# Patient Record
Sex: FEMALE | Race: WHITE | Employment: UNEMPLOYED
[De-identification: names, ages, dates, MRNs, and addresses within clinical notes are randomized per-mention and may not be internally consistent; named-entity substitution may affect disease eponyms.]

---

## 2017-01-18 ENCOUNTER — OFFICE VISIT (OUTPATIENT)
Dept: FAMILY MEDICINE CLINIC | Facility: CLINIC | Age: 27
End: 2017-01-18

## 2017-01-18 VITALS
WEIGHT: 214.6 LBS | SYSTOLIC BLOOD PRESSURE: 131 MMHG | HEART RATE: 88 BPM | BODY MASS INDEX: 30.72 KG/M2 | DIASTOLIC BLOOD PRESSURE: 83 MMHG | HEIGHT: 70 IN

## 2017-01-18 DIAGNOSIS — L40.9 PSORIASIS: ICD-10-CM

## 2017-01-18 DIAGNOSIS — J45.20 REACTIVE AIRWAY DISEASE, MILD INTERMITTENT, UNCOMPLICATED: Primary | ICD-10-CM

## 2017-01-18 PROCEDURE — 99214 OFFICE O/P EST MOD 30 MIN: CPT | Performed by: FAMILY MEDICINE

## 2017-02-16 ENCOUNTER — OFFICE VISIT (OUTPATIENT)
Dept: FAMILY MEDICINE CLINIC | Facility: CLINIC | Age: 27
End: 2017-02-16

## 2017-02-16 VITALS
DIASTOLIC BLOOD PRESSURE: 69 MMHG | BODY MASS INDEX: 31.26 KG/M2 | HEART RATE: 74 BPM | TEMPERATURE: 96.9 F | WEIGHT: 218.4 LBS | OXYGEN SATURATION: 95 % | HEIGHT: 70 IN | SYSTOLIC BLOOD PRESSURE: 104 MMHG

## 2017-02-16 DIAGNOSIS — J45.20 REACTIVE AIRWAY DISEASE, MILD INTERMITTENT, UNCOMPLICATED: Primary | ICD-10-CM

## 2017-02-16 DIAGNOSIS — L40.9 PSORIASIS: ICD-10-CM

## 2017-02-16 PROCEDURE — 99213 OFFICE O/P EST LOW 20 MIN: CPT | Performed by: FAMILY MEDICINE

## 2017-04-22 ENCOUNTER — HOSPITAL ENCOUNTER (EMERGENCY)
Age: 27
Discharge: HOME | End: 2017-04-22
Attending: EMERGENCY MEDICINE
Payer: COMMERCIAL

## 2017-04-22 ENCOUNTER — APPOINTMENT (OUTPATIENT)
Dept: CT IMAGING | Age: 27
End: 2017-04-22
Payer: COMMERCIAL

## 2017-04-22 VITALS
RESPIRATION RATE: 16 BRPM | DIASTOLIC BLOOD PRESSURE: 54 MMHG | HEART RATE: 94 BPM | OXYGEN SATURATION: 99 % | WEIGHT: 220 LBS | HEIGHT: 70 IN | SYSTOLIC BLOOD PRESSURE: 109 MMHG | BODY MASS INDEX: 31.5 KG/M2 | TEMPERATURE: 98.5 F

## 2017-04-22 DIAGNOSIS — S09.90XA HEAD INJURY, INITIAL ENCOUNTER: Primary | ICD-10-CM

## 2017-04-22 DIAGNOSIS — G43.109 MIGRAINE WITH AURA AND WITHOUT STATUS MIGRAINOSUS, NOT INTRACTABLE: ICD-10-CM

## 2017-04-22 PROCEDURE — 2580000003 HC RX 258: Performed by: PHYSICIAN ASSISTANT

## 2017-04-22 PROCEDURE — 99284 EMERGENCY DEPT VISIT MOD MDM: CPT

## 2017-04-22 PROCEDURE — 96374 THER/PROPH/DIAG INJ IV PUSH: CPT

## 2017-04-22 PROCEDURE — 6360000002 HC RX W HCPCS: Performed by: PHYSICIAN ASSISTANT

## 2017-04-22 PROCEDURE — 70450 CT HEAD/BRAIN W/O DYE: CPT

## 2017-04-22 PROCEDURE — 96375 TX/PRO/DX INJ NEW DRUG ADDON: CPT

## 2017-04-22 RX ORDER — PROMETHAZINE HYDROCHLORIDE 25 MG/ML
12.5 INJECTION, SOLUTION INTRAMUSCULAR; INTRAVENOUS ONCE
Status: DISCONTINUED | OUTPATIENT
Start: 2017-04-22 | End: 2017-04-22

## 2017-04-22 RX ORDER — 0.9 % SODIUM CHLORIDE 0.9 %
1000 INTRAVENOUS SOLUTION INTRAVENOUS ONCE
Status: COMPLETED | OUTPATIENT
Start: 2017-04-22 | End: 2017-04-22

## 2017-04-22 RX ORDER — KETOROLAC TROMETHAMINE 30 MG/ML
30 INJECTION, SOLUTION INTRAMUSCULAR; INTRAVENOUS ONCE
Status: COMPLETED | OUTPATIENT
Start: 2017-04-22 | End: 2017-04-22

## 2017-04-22 RX ORDER — DIPHENHYDRAMINE HYDROCHLORIDE 50 MG/ML
50 INJECTION INTRAMUSCULAR; INTRAVENOUS ONCE
Status: COMPLETED | OUTPATIENT
Start: 2017-04-22 | End: 2017-04-22

## 2017-04-22 RX ADMIN — KETOROLAC TROMETHAMINE 30 MG: 30 INJECTION, SOLUTION INTRAMUSCULAR at 13:33

## 2017-04-22 RX ADMIN — PROCHLORPERAZINE EDISYLATE 10 MG: 5 INJECTION INTRAMUSCULAR; INTRAVENOUS at 13:40

## 2017-04-22 RX ADMIN — DIPHENHYDRAMINE HYDROCHLORIDE 50 MG: 50 INJECTION, SOLUTION INTRAMUSCULAR; INTRAVENOUS at 13:34

## 2017-04-22 RX ADMIN — SODIUM CHLORIDE 1000 ML: 9 INJECTION, SOLUTION INTRAVENOUS at 13:33

## 2017-04-22 ASSESSMENT — PAIN SCALES - GENERAL
PAINLEVEL_OUTOF10: 8
PAINLEVEL_OUTOF10: 8

## 2017-04-22 ASSESSMENT — PAIN DESCRIPTION - LOCATION: LOCATION: HEAD

## 2017-05-01 ENCOUNTER — TELEPHONE (OUTPATIENT)
Dept: FAMILY MEDICINE CLINIC | Age: 27
End: 2017-05-01

## 2017-05-01 ENCOUNTER — HOSPITAL ENCOUNTER (OUTPATIENT)
Age: 27
Setting detail: SPECIMEN
Discharge: HOME | End: 2017-05-01
Payer: COMMERCIAL

## 2017-05-01 DIAGNOSIS — E66.09 NON MORBID OBESITY DUE TO EXCESS CALORIES: Primary | ICD-10-CM

## 2017-05-01 DIAGNOSIS — E66.09 NON MORBID OBESITY DUE TO EXCESS CALORIES: ICD-10-CM

## 2017-05-01 LAB
ESTIMATED AVERAGE GLUCOSE: 111 MG/DL
HBA1C MFR BLD: 5.5 % (ref 4–6)
THYROXINE, FREE: 1.03 NG/DL (ref 0.93–1.7)
TSH SERPL DL<=0.05 MIU/L-ACNC: 5.84 MIU/L (ref 0.3–5)

## 2017-05-01 PROCEDURE — 84439 ASSAY OF FREE THYROXINE: CPT

## 2017-05-01 PROCEDURE — 36415 COLL VENOUS BLD VENIPUNCTURE: CPT

## 2017-05-01 PROCEDURE — 83036 HEMOGLOBIN GLYCOSYLATED A1C: CPT

## 2017-05-01 PROCEDURE — 84443 ASSAY THYROID STIM HORMONE: CPT

## 2017-05-03 ENCOUNTER — HOSPITAL ENCOUNTER (OUTPATIENT)
Age: 27
Discharge: HOME | End: 2017-05-03
Payer: COMMERCIAL

## 2017-05-03 ENCOUNTER — OFFICE VISIT (OUTPATIENT)
Dept: FAMILY MEDICINE CLINIC | Age: 27
End: 2017-05-03
Payer: COMMERCIAL

## 2017-05-03 VITALS
BODY MASS INDEX: 31.3 KG/M2 | TEMPERATURE: 96.9 F | WEIGHT: 218.6 LBS | DIASTOLIC BLOOD PRESSURE: 83 MMHG | OXYGEN SATURATION: 96 % | SYSTOLIC BLOOD PRESSURE: 124 MMHG | HEART RATE: 86 BPM | HEIGHT: 70 IN

## 2017-05-03 DIAGNOSIS — E03.8 SUBCLINICAL HYPOTHYROIDISM: ICD-10-CM

## 2017-05-03 DIAGNOSIS — R59.9 ENLARGED LYMPH NODE: ICD-10-CM

## 2017-05-03 DIAGNOSIS — L40.9 PSORIASIS: ICD-10-CM

## 2017-05-03 DIAGNOSIS — E03.8 SUBCLINICAL HYPOTHYROIDISM: Primary | ICD-10-CM

## 2017-05-03 DIAGNOSIS — J02.9 SORE THROAT: ICD-10-CM

## 2017-05-03 LAB — MONONUCLEOSIS SCREEN: NEGATIVE

## 2017-05-03 PROCEDURE — 99214 OFFICE O/P EST MOD 30 MIN: CPT | Performed by: FAMILY MEDICINE

## 2017-05-03 PROCEDURE — 36415 COLL VENOUS BLD VENIPUNCTURE: CPT

## 2017-05-03 PROCEDURE — 86376 MICROSOMAL ANTIBODY EACH: CPT

## 2017-05-03 PROCEDURE — 86308 HETEROPHILE ANTIBODY SCREEN: CPT

## 2017-05-03 RX ORDER — CYCLOBENZAPRINE HCL 10 MG
TABLET ORAL
COMMUNITY
End: 2017-11-28 | Stop reason: ALTCHOICE

## 2017-05-03 RX ORDER — AMOXICILLIN 500 MG/1
500 CAPSULE ORAL 2 TIMES DAILY
Qty: 20 CAPSULE | Refills: 0 | Status: SHIPPED | OUTPATIENT
Start: 2017-05-03 | End: 2017-05-13

## 2017-05-03 RX ORDER — CLOBETASOL PROPIONATE 0.5 MG/G
CREAM TOPICAL 2 TIMES DAILY
Qty: 1 TUBE | Refills: 1 | Status: SHIPPED | OUTPATIENT
Start: 2017-05-03 | End: 2018-03-29

## 2017-05-05 LAB — THYROID PEROXIDASE (TPO) AB: 113 IU/ML (ref 0–35)

## 2017-05-08 DIAGNOSIS — E03.8 SUBCLINICAL HYPOTHYROIDISM: Primary | ICD-10-CM

## 2017-05-08 RX ORDER — LEVOTHYROXINE AND LIOTHYRONINE 19; 4.5 UG/1; UG/1
30 TABLET ORAL DAILY
Qty: 30 TABLET | Refills: 3 | Status: SHIPPED | OUTPATIENT
Start: 2017-05-08 | End: 2017-06-20 | Stop reason: SDUPTHER

## 2017-05-31 ENCOUNTER — HOSPITAL ENCOUNTER (OUTPATIENT)
Age: 27
Discharge: HOME | End: 2017-05-31
Payer: COMMERCIAL

## 2017-05-31 ENCOUNTER — OFFICE VISIT (OUTPATIENT)
Dept: FAMILY MEDICINE CLINIC | Age: 27
End: 2017-05-31
Payer: COMMERCIAL

## 2017-05-31 VITALS
HEIGHT: 70 IN | OXYGEN SATURATION: 97 % | SYSTOLIC BLOOD PRESSURE: 122 MMHG | DIASTOLIC BLOOD PRESSURE: 82 MMHG | TEMPERATURE: 97.1 F | BODY MASS INDEX: 30.92 KG/M2 | HEART RATE: 80 BPM | RESPIRATION RATE: 17 BRPM | WEIGHT: 216 LBS

## 2017-05-31 DIAGNOSIS — R53.82 CHRONIC FATIGUE: ICD-10-CM

## 2017-05-31 DIAGNOSIS — J30.1 SEASONAL ALLERGIC RHINITIS DUE TO POLLEN: ICD-10-CM

## 2017-05-31 DIAGNOSIS — R41.3 MEMORY DIFFICULTIES: ICD-10-CM

## 2017-05-31 DIAGNOSIS — E03.8 SUBCLINICAL HYPOTHYROIDISM: Primary | ICD-10-CM

## 2017-05-31 DIAGNOSIS — R68.2 DRY MOUTH: ICD-10-CM

## 2017-05-31 LAB
ABSOLUTE EOS #: 0.3 K/UL (ref 0–0.4)
ABSOLUTE LYMPH #: 2 K/UL (ref 1–4.8)
ABSOLUTE MONO #: 0.5 K/UL (ref 0.1–1.3)
ALBUMIN SERPL-MCNC: 4 G/DL (ref 3.5–5.2)
ALBUMIN/GLOBULIN RATIO: ABNORMAL (ref 1–2.5)
ALP BLD-CCNC: 82 U/L (ref 35–104)
ALT SERPL-CCNC: 16 U/L (ref 5–33)
ANION GAP SERPL CALCULATED.3IONS-SCNC: 10 MMOL/L (ref 9–17)
AST SERPL-CCNC: 17 U/L
BASOPHILS # BLD: 1 %
BASOPHILS ABSOLUTE: 0.1 K/UL (ref 0–0.2)
BILIRUB SERPL-MCNC: 0.44 MG/DL (ref 0.3–1.2)
BUN BLDV-MCNC: 8 MG/DL (ref 6–20)
BUN/CREAT BLD: ABNORMAL (ref 9–20)
C-REACTIVE PROTEIN: 3.7 MG/L (ref 0–5)
CALCIUM SERPL-MCNC: 9.2 MG/DL (ref 8.6–10.4)
CHLORIDE BLD-SCNC: 103 MMOL/L (ref 98–107)
CO2: 27 MMOL/L (ref 20–31)
CREAT SERPL-MCNC: 0.84 MG/DL (ref 0.5–0.9)
DIFFERENTIAL TYPE: NORMAL
EOSINOPHILS RELATIVE PERCENT: 5 %
FOLATE: 14.8 NG/ML
GFR AFRICAN AMERICAN: >60 ML/MIN
GFR NON-AFRICAN AMERICAN: >60 ML/MIN
GFR SERPL CREATININE-BSD FRML MDRD: ABNORMAL ML/MIN/{1.73_M2}
GFR SERPL CREATININE-BSD FRML MDRD: ABNORMAL ML/MIN/{1.73_M2}
GLUCOSE BLD-MCNC: 108 MG/DL (ref 70–99)
HCT VFR BLD CALC: 39.8 % (ref 36–46)
HEMOGLOBIN: 13.3 G/DL (ref 12–16)
LYMPHOCYTES # BLD: 30 %
MCH RBC QN AUTO: 28.6 PG (ref 26–34)
MCHC RBC AUTO-ENTMCNC: 33.4 G/DL (ref 31–37)
MCV RBC AUTO: 85.6 FL (ref 80–100)
MONOCYTES # BLD: 8 %
PDW BLD-RTO: 13.1 % (ref 11.5–14.9)
PLATELET # BLD: 243 K/UL (ref 150–450)
PLATELET ESTIMATE: NORMAL
PMV BLD AUTO: 8.5 FL (ref 6–12)
POTASSIUM SERPL-SCNC: 4.1 MMOL/L (ref 3.7–5.3)
RBC # BLD: 4.65 M/UL (ref 4–5.2)
RBC # BLD: NORMAL 10*6/UL
SEDIMENTATION RATE, ERYTHROCYTE: 17 MM (ref 0–20)
SEG NEUTROPHILS: 56 %
SEGMENTED NEUTROPHILS ABSOLUTE COUNT: 3.7 K/UL (ref 1.3–9.1)
SODIUM BLD-SCNC: 140 MMOL/L (ref 135–144)
TOTAL PROTEIN: 7.5 G/DL (ref 6.4–8.3)
VITAMIN B-12: 320 PG/ML (ref 211–946)
VITAMIN D 25-HYDROXY: 17.9 NG/ML (ref 30–100)
WBC # BLD: 6.6 K/UL (ref 3.5–11)
WBC # BLD: NORMAL 10*3/UL

## 2017-05-31 PROCEDURE — 86235 NUCLEAR ANTIGEN ANTIBODY: CPT

## 2017-05-31 PROCEDURE — 99214 OFFICE O/P EST MOD 30 MIN: CPT | Performed by: FAMILY MEDICINE

## 2017-05-31 PROCEDURE — 86140 C-REACTIVE PROTEIN: CPT

## 2017-05-31 PROCEDURE — 80053 COMPREHEN METABOLIC PANEL: CPT

## 2017-05-31 PROCEDURE — 82746 ASSAY OF FOLIC ACID SERUM: CPT

## 2017-05-31 PROCEDURE — 82607 VITAMIN B-12: CPT

## 2017-05-31 PROCEDURE — 86038 ANTINUCLEAR ANTIBODIES: CPT

## 2017-05-31 PROCEDURE — 86225 DNA ANTIBODY NATIVE: CPT

## 2017-05-31 PROCEDURE — 36415 COLL VENOUS BLD VENIPUNCTURE: CPT

## 2017-05-31 PROCEDURE — 82306 VITAMIN D 25 HYDROXY: CPT

## 2017-05-31 PROCEDURE — 85651 RBC SED RATE NONAUTOMATED: CPT

## 2017-05-31 PROCEDURE — 85025 COMPLETE CBC W/AUTO DIFF WBC: CPT

## 2017-05-31 RX ORDER — FLUTICASONE PROPIONATE 50 MCG
1 SPRAY, SUSPENSION (ML) NASAL DAILY
Qty: 1 BOTTLE | Refills: 3 | Status: SHIPPED | OUTPATIENT
Start: 2017-05-31 | End: 2021-03-16 | Stop reason: ALTCHOICE

## 2017-05-31 RX ORDER — LORATADINE 10 MG/1
10 TABLET ORAL DAILY
Qty: 30 TABLET | Refills: 2 | Status: SHIPPED | OUTPATIENT
Start: 2017-05-31 | End: 2018-05-24 | Stop reason: SDUPTHER

## 2017-06-01 DIAGNOSIS — E55.9 VITAMIN D DEFICIENCY: Primary | ICD-10-CM

## 2017-06-01 LAB
ANA REFERENCE RANGE:: ABNORMAL
ANTI DNA DOUBLE STRANDED: 5 IU/ML
ANTI JO-1 IGG: 19 U/ML
ANTI RNP AB: 121 U/ML
ANTI SSA: 25 U/ML
ANTI SSB: 4 U/ML
ANTI-CENTROMERE: 8 U/ML
ANTI-NUCLEAR ANTIBODY (ANA): POSITIVE
ANTI-SCLERODERMA: 10 U/ML
ANTI-SMITH: 17 U/ML
HISTONE ANTIBODY: 11 U/ML

## 2017-06-01 RX ORDER — B-COMPLEX WITH VITAMIN C
1 TABLET ORAL DAILY
Qty: 30 TABLET | Refills: 3 | Status: SHIPPED | OUTPATIENT
Start: 2017-06-01 | End: 2018-02-22 | Stop reason: SDUPTHER

## 2017-06-06 DIAGNOSIS — R76.8 ANTI-RNP ANTIBODIES PRESENT: Primary | ICD-10-CM

## 2017-06-19 ENCOUNTER — HOSPITAL ENCOUNTER (OUTPATIENT)
Age: 27
Discharge: HOME | End: 2017-06-19
Payer: COMMERCIAL

## 2017-06-19 ENCOUNTER — OFFICE VISIT (OUTPATIENT)
Dept: FAMILY MEDICINE CLINIC | Age: 27
End: 2017-06-19
Payer: COMMERCIAL

## 2017-06-19 VITALS
BODY MASS INDEX: 31.92 KG/M2 | TEMPERATURE: 97.9 F | WEIGHT: 223 LBS | DIASTOLIC BLOOD PRESSURE: 73 MMHG | HEART RATE: 88 BPM | HEIGHT: 70 IN | RESPIRATION RATE: 20 BRPM | SYSTOLIC BLOOD PRESSURE: 118 MMHG

## 2017-06-19 DIAGNOSIS — E03.8 SUBCLINICAL HYPOTHYROIDISM: Primary | ICD-10-CM

## 2017-06-19 DIAGNOSIS — E03.8 SUBCLINICAL HYPOTHYROIDISM: ICD-10-CM

## 2017-06-19 DIAGNOSIS — R76.8 POSITIVE ANA (ANTINUCLEAR ANTIBODY): ICD-10-CM

## 2017-06-19 DIAGNOSIS — E55.9 VITAMIN D DEFICIENCY: ICD-10-CM

## 2017-06-19 DIAGNOSIS — R41.3 MEMORY LOSS: ICD-10-CM

## 2017-06-19 LAB
THYROXINE, FREE: 0.89 NG/DL (ref 0.93–1.7)
TSH SERPL DL<=0.05 MIU/L-ACNC: 6.85 MIU/L (ref 0.3–5)

## 2017-06-19 PROCEDURE — 99214 OFFICE O/P EST MOD 30 MIN: CPT | Performed by: NURSE PRACTITIONER

## 2017-06-19 PROCEDURE — 84443 ASSAY THYROID STIM HORMONE: CPT

## 2017-06-19 PROCEDURE — 84439 ASSAY OF FREE THYROXINE: CPT

## 2017-06-19 PROCEDURE — 36415 COLL VENOUS BLD VENIPUNCTURE: CPT

## 2017-06-19 RX ORDER — ERGOCALCIFEROL 1.25 MG/1
CAPSULE ORAL
Qty: 16 CAPSULE | Refills: 0 | Status: SHIPPED | OUTPATIENT
Start: 2017-06-19 | End: 2017-12-19 | Stop reason: ALTCHOICE

## 2017-06-19 ASSESSMENT — ENCOUNTER SYMPTOMS
COUGH: 0
SHORTNESS OF BREATH: 0
ABDOMINAL PAIN: 0
NAUSEA: 0

## 2017-06-20 DIAGNOSIS — E03.8 SUBCLINICAL HYPOTHYROIDISM: ICD-10-CM

## 2017-06-20 RX ORDER — LEVOTHYROXINE AND LIOTHYRONINE 38; 9 UG/1; UG/1
60 TABLET ORAL DAILY
Qty: 60 TABLET | Refills: 0 | Status: SHIPPED | OUTPATIENT
Start: 2017-06-20 | End: 2017-08-22 | Stop reason: SDUPTHER

## 2017-08-21 ENCOUNTER — TELEPHONE (OUTPATIENT)
Dept: FAMILY MEDICINE CLINIC | Age: 27
End: 2017-08-21

## 2017-08-21 ENCOUNTER — HOSPITAL ENCOUNTER (OUTPATIENT)
Age: 27
Discharge: HOME | End: 2017-08-21
Payer: COMMERCIAL

## 2017-08-21 DIAGNOSIS — E03.9 ACQUIRED HYPOTHYROIDISM: ICD-10-CM

## 2017-08-21 DIAGNOSIS — E03.9 ACQUIRED HYPOTHYROIDISM: Primary | ICD-10-CM

## 2017-08-21 DIAGNOSIS — E55.9 VITAMIN D DEFICIENCY: ICD-10-CM

## 2017-08-21 LAB
THYROXINE, FREE: 0.86 NG/DL (ref 0.93–1.7)
TSH SERPL DL<=0.05 MIU/L-ACNC: 5.75 MIU/L (ref 0.3–5)
VITAMIN D 25-HYDROXY: 24.2 NG/ML (ref 30–100)

## 2017-08-21 PROCEDURE — 84443 ASSAY THYROID STIM HORMONE: CPT

## 2017-08-21 PROCEDURE — 82306 VITAMIN D 25 HYDROXY: CPT

## 2017-08-21 PROCEDURE — 36415 COLL VENOUS BLD VENIPUNCTURE: CPT

## 2017-08-21 PROCEDURE — 84439 ASSAY OF FREE THYROXINE: CPT

## 2017-08-22 ENCOUNTER — OFFICE VISIT (OUTPATIENT)
Dept: FAMILY MEDICINE CLINIC | Age: 27
End: 2017-08-22
Payer: COMMERCIAL

## 2017-08-22 VITALS
BODY MASS INDEX: 34.83 KG/M2 | OXYGEN SATURATION: 99 % | HEIGHT: 68 IN | DIASTOLIC BLOOD PRESSURE: 60 MMHG | TEMPERATURE: 98.6 F | SYSTOLIC BLOOD PRESSURE: 102 MMHG | WEIGHT: 229.8 LBS | HEART RATE: 80 BPM | RESPIRATION RATE: 20 BRPM

## 2017-08-22 DIAGNOSIS — R76.8 POSITIVE ANA (ANTINUCLEAR ANTIBODY): ICD-10-CM

## 2017-08-22 DIAGNOSIS — J45.20 REACTIVE AIRWAY DISEASE, MILD INTERMITTENT, UNCOMPLICATED: ICD-10-CM

## 2017-08-22 DIAGNOSIS — E03.8 SUBCLINICAL HYPOTHYROIDISM: Primary | ICD-10-CM

## 2017-08-22 DIAGNOSIS — R76.8 ANTI-RNP ANTIBODIES PRESENT: ICD-10-CM

## 2017-08-22 PROCEDURE — 99214 OFFICE O/P EST MOD 30 MIN: CPT | Performed by: FAMILY MEDICINE

## 2017-08-22 RX ORDER — LEVOTHYROXINE AND LIOTHYRONINE 57; 13.5 UG/1; UG/1
90 TABLET ORAL DAILY
Qty: 30 TABLET | Refills: 3 | Status: SHIPPED | OUTPATIENT
Start: 2017-08-22 | End: 2018-01-09 | Stop reason: SDUPTHER

## 2017-11-28 ENCOUNTER — OFFICE VISIT (OUTPATIENT)
Dept: FAMILY MEDICINE CLINIC | Age: 27
End: 2017-11-28
Payer: COMMERCIAL

## 2017-11-28 VITALS
SYSTOLIC BLOOD PRESSURE: 120 MMHG | WEIGHT: 236.4 LBS | TEMPERATURE: 97.1 F | BODY MASS INDEX: 32.02 KG/M2 | OXYGEN SATURATION: 97 % | HEART RATE: 78 BPM | HEIGHT: 72 IN | DIASTOLIC BLOOD PRESSURE: 80 MMHG | RESPIRATION RATE: 20 BRPM

## 2017-11-28 DIAGNOSIS — M51.26 LUMBAR DISC HERNIATION: Primary | ICD-10-CM

## 2017-11-28 DIAGNOSIS — M54.16 LUMBAR RADICULOPATHY: ICD-10-CM

## 2017-11-28 DIAGNOSIS — E03.8 SUBCLINICAL HYPOTHYROIDISM: ICD-10-CM

## 2017-11-28 DIAGNOSIS — R73.03 PREDIABETES: ICD-10-CM

## 2017-11-28 LAB — HBA1C MFR BLD: 5.3 %

## 2017-11-28 PROCEDURE — 99214 OFFICE O/P EST MOD 30 MIN: CPT | Performed by: FAMILY MEDICINE

## 2017-11-28 PROCEDURE — 83036 HEMOGLOBIN GLYCOSYLATED A1C: CPT | Performed by: FAMILY MEDICINE

## 2017-11-28 PROCEDURE — 1036F TOBACCO NON-USER: CPT | Performed by: FAMILY MEDICINE

## 2017-11-28 PROCEDURE — G8484 FLU IMMUNIZE NO ADMIN: HCPCS | Performed by: FAMILY MEDICINE

## 2017-11-28 PROCEDURE — G8427 DOCREV CUR MEDS BY ELIG CLIN: HCPCS | Performed by: FAMILY MEDICINE

## 2017-11-28 PROCEDURE — G8418 CALC BMI BLW LOW PARAM F/U: HCPCS | Performed by: FAMILY MEDICINE

## 2017-11-28 RX ORDER — GABAPENTIN 100 MG/1
100 CAPSULE ORAL DAILY
Qty: 90 CAPSULE | Refills: 3 | Status: SHIPPED | OUTPATIENT
Start: 2017-11-28 | End: 2018-02-15 | Stop reason: SDUPTHER

## 2017-11-28 RX ORDER — TIZANIDINE HYDROCHLORIDE 2 MG/1
2 CAPSULE, GELATIN COATED ORAL 3 TIMES DAILY
Qty: 30 CAPSULE | Refills: 0 | Status: SHIPPED | OUTPATIENT
Start: 2017-11-28 | End: 2018-02-22 | Stop reason: SDUPTHER

## 2017-11-28 RX ORDER — NAPROXEN 500 MG/1
500 TABLET ORAL 2 TIMES DAILY WITH MEALS
Qty: 60 TABLET | Refills: 3 | Status: SHIPPED | OUTPATIENT
Start: 2017-11-28 | End: 2018-05-24 | Stop reason: SDUPTHER

## 2017-11-28 ASSESSMENT — ENCOUNTER SYMPTOMS
EYE REDNESS: 0
DIARRHEA: 0
STRIDOR: 0
COUGH: 0
RHINORRHEA: 0
BLOOD IN STOOL: 0
ABDOMINAL PAIN: 0
WHEEZING: 0
BACK PAIN: 1
PHOTOPHOBIA: 0
NAUSEA: 0
SHORTNESS OF BREATH: 0
SINUS PRESSURE: 0

## 2017-11-28 NOTE — PROGRESS NOTES
Visit Information    Have you changed or started any medications since your last visit including any over-the-counter medicines, vitamins, or herbal medicines? no   Have you stopped taking any of your medications? Is so, why? -  no  Are you having any side effects from any of your medications? - no    Have you seen any other physician or provider since your last visit? yes -  chriopractor   Have you had any other diagnostic tests since your last visit? yes -    Have you been seen in the emergency room and/or had an admission in a hospital since we last saw you?  no   Have you had your routine dental cleaning in the past 6 months?  no     Do you have an active MyChart account? If no, what is the barrier?   Yes    Patient Care Team:  Alina Byrd MD as PCP - General (Family Medicine)    Medical History Review  Past Medical, Family, and Social History reviewed and does contribute to the patient presenting condition    Health Maintenance   Topic Date Due    Pneumococcal med risk (1 of 1 - PPSV23) 04/04/2008    Cervical cancer screen  04/04/2010    Flu vaccine (1) 09/01/2017    DTaP/Tdap/Td vaccine (2 - Td) 12/27/2026    HIV screen  Completed

## 2017-11-28 NOTE — PROGRESS NOTES
Vit D, 25-Hydroxy 08/21/2017 24.2* 30.0 - 100.0 ng/mL Final    Comment:    Reference Range:  Vitamin D status         Range   Deficiency              <20 ng/mL   Mild Deficiency       20-30 ng/mL   Sufficiency           ng/mL   Toxicity               >100 ng/mL  Performed at [facility] [address] [phone]      TSH 08/21/2017 5.75* 0.30 - 5.00 mIU/L Final    Comment: Performed at [facility]: HEATH BACA MAYA [address] [facility]. [address]   [phone]      Thyroxine, Free 08/21/2017 0.86* 0.93 - 1.70 ng/dL Final    Comment: Performed at [facility]: HEATH BACA MAYA [address] [facility].  [address]   [phone]           Most recent labs reviewed:     Lab Results   Component Value Date    WBC 6.6 05/31/2017    HGB 13.3 05/31/2017    HCT 39.8 05/31/2017    MCV 85.6 05/31/2017     05/31/2017       Lab Results   Component Value Date     05/31/2017    K 4.1 05/31/2017     05/31/2017    CO2 27 05/31/2017    BUN 8 05/31/2017    CREATININE 0.84 05/31/2017    GLUCOSE 108 05/31/2017    CALCIUM 9.2 05/31/2017        Lab Results   Component Value Date    ALT 16 05/31/2017    AST 17 05/31/2017    ALKPHOS 82 05/31/2017    BILITOT 0.44 05/31/2017       Lab Results   Component Value Date    TSH 5.75 (H) 08/21/2017       No results found for: CHOL  No results found for: TRIG  No results found for: HDL  No results found for: LDLCALC, LDLCHOLESTEROL  No results found for: LABVLDL, VLDL  No results found for: [facility]    Lab Results   Component Value Date    LABA1C 5.3 11/28/2017       Lab Results   Component Value Date    PBWLUFPI48 320 05/31/2017       Lab Results   Component Value Date    FOLATE 14.8 05/31/2017       No results found for: IRON, TIBC, FERRITIN    Lab Results   Component Value Date    VITD25 24.2 (L) 08/21/2017             Current Outpatient Prescriptions   Medication Sig Dispense Refill    gabapentin (NEURONTIN) 100 MG capsule Take 1 capsule by mouth daily 90 capsule 3    diclofenac sodium 1 % GEL Apply 2 g topically 2 times daily 1 Tube 3    naproxen (NAPROSYN) 500 MG tablet Take 1 tablet by mouth 2 times daily (with meals) 60 tablet 3    tiZANidine (ZANAFLEX) 2 MG capsule Take 1 capsule by mouth 3 times daily 30 capsule 0    thyroid (ARMOUR THYROID) 90 MG tablet Take 1 tablet by mouth daily 30 tablet 3    Calcium Carbonate-Vitamin D (OYSTER SHELL CALCIUM/D) 500-200 MG-UNIT TABS Take 1 tablet by mouth daily 30 tablet 3    loratadine (CLARITIN) 10 MG tablet Take 1 tablet by mouth daily 30 tablet 2    albuterol sulfate  (90 BASE) MCG/ACT inhaler Inhale 2 puffs into the lungs every 6 hours as needed for Wheezing or Shortness of Breath 1 Inhaler 1    Respiratory Therapy Supplies (BREATHERITE VALVED MDI CHAMBER) RICHARD To be used with inhaler 1 Device 1    vitamin D (ERGOCALCIFEROL) 27244 units CAPS capsule Take one cap po twice weekly 16 capsule 0    fluticasone (FLONASE) 50 MCG/ACT nasal spray 1 spray by Nasal route daily 1 Bottle 3    clobetasol prop emollient base (CLOBETASOL PROPIONATE E) 0.05 % CREA Apply topically 2 times daily 1 Tube 1     No current facility-administered medications for this visit. Social History     Social History    Marital status:      Spouse name: N/A    Number of children: N/A    Years of education: N/A     Occupational History    Not on file.      Social History Main Topics    Smoking status: Never Smoker    Smokeless tobacco: Never Used    Alcohol use No    Drug use: No    Sexual activity: Not Currently     Partners: Male     Other Topics Concern    Not on file     Social History Narrative    No narrative on file     Counseling given: Not Answered        Family History   Problem Relation Age of Onset    Diabetes Paternal Grandmother     Heart Failure Maternal Grandmother     Heart Failure Maternal Grandfather     Diabetes Father              -rest of complaints with corresponding details per ROS    The patient's past medical, surgical, social, and family history as well as her current medications and allergies were reviewed as documented in today's encounter. Review of Systems   Constitutional: Negative for activity change, appetite change, fatigue and unexpected weight change. HENT: Negative for congestion, postnasal drip, rhinorrhea and sinus pressure. Eyes: Negative for photophobia, redness and visual disturbance. Respiratory: Negative for cough, shortness of breath, wheezing and stridor. Cardiovascular: Negative for chest pain and palpitations. Gastrointestinal: Negative for abdominal pain, blood in stool, diarrhea and nausea. Endocrine: Negative for polydipsia and polyphagia. Genitourinary: Negative for enuresis, flank pain, frequency, pelvic pain, urgency, vaginal bleeding and vaginal discharge. Musculoskeletal: Positive for arthralgias, back pain, gait problem and myalgias. Negative for neck pain and neck stiffness. Neurological: Positive for weakness and numbness. Negative for dizziness, light-headedness and headaches. Psychiatric/Behavioral: Negative for behavioral problems, confusion and decreased concentration. The patient is not nervous/anxious and is not hyperactive. Physical Exam    PHYSICAL EXAM:   VITALS:   Vitals:    11/28/17 0803   BP: 120/80   Pulse: 78   Resp: 20   Temp: 97.1 °F (36.2 °C)   SpO2: 97%     GENERAL:  Patient is a well-developed, well-nourished female  in no acute distress, alert and oriented x3, appropriate and pleasant conversation. HEAD: Normocephalic, atraumatic. EYES: Pupils equal, round and reactive to light and accommodation, extraocular   movements intact. ENT: Moist mucous membranes. No erythema is noted. NECK: Supple. No masses. No lymphadenopathy. CARDIOVASCULAR: Regular rate and rhythm. PULMONARY: Lungs are clear to auscultation bilaterally.    ABDOMEN: Soft, nontender, nondistended. Positive bowel sounds. MUSCULOSKELETAL:  Severe  tenderness at  lumbar spine at L2-L3 , patient winced due to pain. SLR positive on left side. Strength not checked due to pain. Sensations normal.  NEUROLOGIC: Cranial nerves II through XII grossly intact. No focal deficits are noted. ASSESSMENT AND PLAN      1. Lumbar disc herniation  - MRI report discussed with patient. Patient does not want to go for the physical therapy , referred to neurosurgeon for second opinion. Started on Neurontin , and topical lidocaine and naproxen as needed. - gabapentin (NEURONTIN) 100 MG capsule; Take 1 capsule by mouth daily  Dispense: 90 capsule; Refill: 3  - diclofenac sodium 1 % GEL; Apply 2 g topically 2 times daily  Dispense: 1 Tube; Refill: 3  - naproxen (NAPROSYN) 500 MG tablet; Take 1 tablet by mouth 2 times daily (with meals)  Dispense: 60 tablet; Refill: 3  - [address], MD, Neurosurgery [facility] [place]  - tiZANidine (ZANAFLEX) 2 MG capsule; Take 1 capsule by mouth 3 times daily  Dispense: 30 capsule; Refill: 0    2. Lumbar radiculopathy  - referral to pain management , Neurontin and naproxen. Patient will call me , if her symptoms increased  - gabapentin (NEURONTIN) 100 MG capsule; Take 1 capsule by mouth daily  Dispense: 90 capsule; Refill: 3  - diclofenac sodium 1 % GEL; Apply 2 g topically 2 times daily  Dispense: 1 Tube; Refill: 3  - naproxen (NAPROSYN) 500 MG tablet; Take 1 tablet by mouth 2 times daily (with meals)  Dispense: 60 tablet; Refill: 3  - [address], MD, Neurosurgery [facility] [place]  - tiZANidine (ZANAFLEX) 2 MG capsule; Take 1 capsule by mouth 3 times daily  Dispense: 30 capsule; Refill: 0    3. Subclinical hypothyroidism  - continue Synthroid , repeat TSH  - TSH With Reflex Ft4; Future    4.  Prediabetes  - hemoglobin A1c has improved  - POCT glycosylated hemoglobin (Hb A1C)      Orders Placed This Encounter   Procedures    TSH With Reflex Ft4     Standing Status:   Future     Standing Expiration Date:   11/28/2018   Yadira Emerson MD, Neurosurgery [address]     Referral Priority:   Routine     Referral Type:   Consult for Advice and Opinion     Referral Reason:   Specialty Services Required     Requested Specialty:   Neurosurgery     Number of Visits Requested:   Noé 42     Referral Priority:   Routine     Referral Type:   Consult for Advice and Opinion     Referral Reason:   Specialty Services Required     Number of Visits Requested:   1    POCT glycosylated hemoglobin (Hb A1C)         Medications Discontinued During This Encounter   Medication Reason    cyclobenzaprine (FLEXERIL) 10 MG tablet Alternate therapy       Vu Tarango received counseling on the following healthy behaviors: nutrition, exercise and medication adherence  Reviewed prior labs and health maintenance  Continue current medications, diet and exercise. Discussed use, benefit, and side effects of prescribed medications. Barriers to medication compliance addressed. Patient given educational materials - see patient instructions  Was a self-tracking handout given in paper form or via [employer]? Yes    Requested Prescriptions     Signed Prescriptions Disp Refills    gabapentin (NEURONTIN) 100 MG capsule 90 capsule 3     Sig: Take 1 capsule by mouth daily    diclofenac sodium 1 % GEL 1 Tube 3     Sig: Apply 2 g topically 2 times daily    naproxen (NAPROSYN) 500 MG tablet 60 tablet 3     Sig: Take 1 tablet by mouth 2 times daily (with meals)    tiZANidine (ZANAFLEX) 2 MG capsule 30 capsule 0     Sig: Take 1 capsule by mouth 3 times daily       All patient questions answered. Patient voiced understanding. Quality Measures    Body mass index is 17.31 kg/m². Elevated. Weight control planned discussed Healthy diet and regular exercise.     BP:

## 2017-12-19 ENCOUNTER — HOSPITAL ENCOUNTER (OUTPATIENT)
Dept: PAIN MANAGEMENT | Age: 27
Discharge: HOME | End: 2017-12-19
Payer: COMMERCIAL

## 2017-12-19 VITALS
TEMPERATURE: 97.5 F | HEART RATE: 79 BPM | WEIGHT: 236 LBS | SYSTOLIC BLOOD PRESSURE: 120 MMHG | HEIGHT: 70 IN | RESPIRATION RATE: 16 BRPM | OXYGEN SATURATION: 98 % | DIASTOLIC BLOOD PRESSURE: 72 MMHG | BODY MASS INDEX: 33.79 KG/M2

## 2017-12-19 DIAGNOSIS — M54.16 LEFT LUMBAR RADICULITIS: ICD-10-CM

## 2017-12-19 DIAGNOSIS — M51.26 LUMBAR DISC HERNIATION: Primary | ICD-10-CM

## 2017-12-19 PROCEDURE — 99205 OFFICE O/P NEW HI 60 MIN: CPT

## 2017-12-19 PROCEDURE — 80307 DRUG TEST PRSMV CHEM ANLYZR: CPT

## 2017-12-19 PROCEDURE — 99244 OFF/OP CNSLTJ NEW/EST MOD 40: CPT | Performed by: ANESTHESIOLOGY

## 2017-12-19 ASSESSMENT — PAIN DESCRIPTION - ONSET: ONSET: ON-GOING

## 2017-12-19 ASSESSMENT — PAIN DESCRIPTION - LOCATION: LOCATION: BACK;LEG

## 2017-12-19 ASSESSMENT — PAIN DESCRIPTION - ORIENTATION: ORIENTATION: RIGHT;LEFT;LOWER

## 2017-12-19 ASSESSMENT — ENCOUNTER SYMPTOMS
RESPIRATORY NEGATIVE: 1
BACK PAIN: 1
EYES NEGATIVE: 1
HEARTBURN: 1

## 2017-12-19 ASSESSMENT — PAIN DESCRIPTION - PAIN TYPE: TYPE: CHRONIC PAIN

## 2017-12-19 ASSESSMENT — PAIN DESCRIPTION - PROGRESSION: CLINICAL_PROGRESSION: GRADUALLY WORSENING

## 2017-12-19 ASSESSMENT — PAIN DESCRIPTION - FREQUENCY: FREQUENCY: CONTINUOUS

## 2017-12-19 ASSESSMENT — PAIN SCALES - GENERAL: PAINLEVEL_OUTOF10: 5
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
## 2017-12-24 LAB
6-ACETYLMORPHINE, UR: NOT DETECTED
7-AMINOCLONAZEPAM, URINE: NOT DETECTED
ALPHA-OH-ALPRAZ, URINE: NOT DETECTED
ALPRAZOLAM, URINE: NOT DETECTED
AMPHETAMINES, URINE: NOT DETECTED
BARBITURATES, URINE: NOT DETECTED
BENZOYLECGONINE, UR: NOT DETECTED
BUPRENORPHINE URINE: NOT DETECTED
CARISOPRODOL, UR: NOT DETECTED
CLONAZEPAM, URINE: NOT DETECTED
CODEINE, URINE: NOT DETECTED
CREATININE URINE: 216 MG/DL (ref 20–400)
DIAZEPAM, URINE: NOT DETECTED
DRUGS EXPECTED, UR: NORMAL
EER HI RES INTERP UR: NORMAL
ETHYL GLUCURONIDE UR: NOT DETECTED
FENTANYL URINE: NOT DETECTED
HYDROCODONE, URINE: NOT DETECTED
HYDROMORPHONE, URINE: NOT DETECTED
LORAZEPAM, URINE: NOT DETECTED
MARIJUANA METAB, UR: NOT DETECTED
MDA, UR: NOT DETECTED
MDEA, EVE, UR: NOT DETECTED
MDMA URINE: NOT DETECTED
MEPERIDINE METAB, UR: NOT DETECTED
METHADONE, URINE: NOT DETECTED
METHAMPHETAMINE, URINE: NOT DETECTED
METHYLPHENIDATE: NOT DETECTED
MIDAZOLAM, URINE: NOT DETECTED
MORPHINE URINE: NOT DETECTED
NORBUPRENORPHINE, URINE: NOT DETECTED
NORDIAZEPAM, URINE: NOT DETECTED
NORFENTANYL, URINE: NOT DETECTED
NORHYDROCODONE, URINE: NOT DETECTED
NOROXYCODONE, URINE: NOT DETECTED
NOROXYMORPHONE, URINE: NOT DETECTED
OXAZEPAM, URINE: NOT DETECTED
OXYCODONE URINE: NOT DETECTED
OXYMORPHONE, URINE: NOT DETECTED
PAIN MANAGEMENT DRUG PANEL INTERP, URINE: NORMAL
PAIN MGT DRUG PANEL, HI RES, UR: NORMAL
PCP,URINE: NOT DETECTED
PHENTERMINE, UR: NOT DETECTED
PROPOXYPHENE, URINE: NOT DETECTED
TAPENTADOL, URINE: NOT DETECTED
TAPENTADOL-O-SULFATE, URINE: NOT DETECTED
TEMAZEPAM, URINE: NOT DETECTED
TRAMADOL, URINE: NOT DETECTED
ZOLPIDEM, URINE: NOT DETECTED

## 2018-01-09 ENCOUNTER — OFFICE VISIT (OUTPATIENT)
Dept: FAMILY MEDICINE CLINIC | Age: 28
End: 2018-01-09
Payer: COMMERCIAL

## 2018-01-09 ENCOUNTER — HOSPITAL ENCOUNTER (OUTPATIENT)
Age: 28
Discharge: HOME | End: 2018-01-09
Payer: COMMERCIAL

## 2018-01-09 ENCOUNTER — TELEPHONE (OUTPATIENT)
Dept: PAIN MANAGEMENT | Age: 28
End: 2018-01-09

## 2018-01-09 VITALS
HEART RATE: 106 BPM | SYSTOLIC BLOOD PRESSURE: 128 MMHG | DIASTOLIC BLOOD PRESSURE: 84 MMHG | HEIGHT: 70 IN | WEIGHT: 239.2 LBS | BODY MASS INDEX: 34.24 KG/M2

## 2018-01-09 DIAGNOSIS — M51.26 LUMBAR DISC HERNIATION: Primary | ICD-10-CM

## 2018-01-09 DIAGNOSIS — J45.20 MILD INTERMITTENT REACTIVE AIRWAY DISEASE WITHOUT COMPLICATION: ICD-10-CM

## 2018-01-09 DIAGNOSIS — E03.8 SUBCLINICAL HYPOTHYROIDISM: ICD-10-CM

## 2018-01-09 DIAGNOSIS — M54.16 LUMBAR RADICULOPATHY: ICD-10-CM

## 2018-01-09 LAB
THYROXINE, FREE: 0.87 NG/DL (ref 0.93–1.7)
TSH SERPL DL<=0.05 MIU/L-ACNC: 8.01 MIU/L (ref 0.3–5)

## 2018-01-09 PROCEDURE — 1036F TOBACCO NON-USER: CPT | Performed by: FAMILY MEDICINE

## 2018-01-09 PROCEDURE — G8417 CALC BMI ABV UP PARAM F/U: HCPCS | Performed by: FAMILY MEDICINE

## 2018-01-09 PROCEDURE — 84439 ASSAY OF FREE THYROXINE: CPT

## 2018-01-09 PROCEDURE — G8484 FLU IMMUNIZE NO ADMIN: HCPCS | Performed by: FAMILY MEDICINE

## 2018-01-09 PROCEDURE — 36415 COLL VENOUS BLD VENIPUNCTURE: CPT

## 2018-01-09 PROCEDURE — 84443 ASSAY THYROID STIM HORMONE: CPT

## 2018-01-09 PROCEDURE — 99214 OFFICE O/P EST MOD 30 MIN: CPT | Performed by: FAMILY MEDICINE

## 2018-01-09 PROCEDURE — G8427 DOCREV CUR MEDS BY ELIG CLIN: HCPCS | Performed by: FAMILY MEDICINE

## 2018-01-09 RX ORDER — LEVOTHYROXINE AND LIOTHYRONINE 76; 18 UG/1; UG/1
120 TABLET ORAL DAILY
Qty: 30 TABLET | Refills: 3 | Status: SHIPPED | OUTPATIENT
Start: 2018-01-09 | End: 2018-02-22 | Stop reason: SDUPTHER

## 2018-01-09 ASSESSMENT — ENCOUNTER SYMPTOMS
WHEEZING: 0
BACK PAIN: 1
NAUSEA: 0
RHINORRHEA: 0
CONSTIPATION: 0
PHOTOPHOBIA: 0
SINUS PRESSURE: 0
RECTAL PAIN: 0
COUGH: 0
BLOOD IN STOOL: 0
ABDOMINAL PAIN: 0
DIARRHEA: 0
SHORTNESS OF BREATH: 0
SORE THROAT: 0
EYE REDNESS: 0
SINUS PAIN: 0

## 2018-01-09 ASSESSMENT — PATIENT HEALTH QUESTIONNAIRE - PHQ9
2. FEELING DOWN, DEPRESSED OR HOPELESS: 0
1. LITTLE INTEREST OR PLEASURE IN DOING THINGS: 0
SUM OF ALL RESPONSES TO PHQ9 QUESTIONS 1 & 2: 0
SUM OF ALL RESPONSES TO PHQ QUESTIONS 1-9: 0

## 2018-01-09 NOTE — PROGRESS NOTES
Chief Complaint   Patient presents with    Back Pain    Asthma         Susanna Yoo  here today for follow up on chronic medical problems, go over labs and/or diagnostic studies, and medication refills. Back Pain and Asthma      HPI: Patient is here for follow-up on asthma and low back pain. Patient's asthma is controlled on albuterol inhaler. She uses albuterol as needed 2-3 times in a month.    -Low back pain, patient has lumbar coagulopathy with disc herniation, patient has been referred to pain management and also neurosurgeon. Patient was seen by pain management and  they are going to start epidural injections. Patient reports she is having symptoms of urinary incontinence and and she has decreased sensations in bowel moments. She was asked same question at pain management, patient reports she did not answer to them because she was having niece with her and she felt awkward in front of her. Patient has appointment with neurosurgeon by the end of this month. Pain is controlled on medications . /84   Pulse 106   Ht 5' 10\" (1.778 m)   Wt 239 lb 3.2 oz (108.5 kg)   LMP 12/26/2017 (Approximate)   Breastfeeding? No   BMI 34.32 kg/m²   Body mass index is 34.32 kg/m². Wt Readings from Last 3 Encounters:   01/09/18 239 lb 3.2 oz (108.5 kg)   12/19/17 236 lb (107 kg)   11/28/17 236 lb 6.4 oz (107.2 kg)        []Negative depression screening. PHQ Scores 1/9/2018 12/27/2016   PHQ2 Score 0 0   PHQ9 Score 0 0      []1-4 = Minimal depression   []5-9 = Mild depression   []10-14 = Moderate depression   []15-19 = Moderately severe depression   []20-27 = Severe depression    Discussed testing with the patient and all questions fully answered.     Hospital Outpatient Visit on 12/19/2017   Component Date Value Ref Range Status    Pain Management Drug Panel Interp,* 12/24/2017 Consistent   Final    Comment: (NOTE)  ________________________________________________________________  DRUGS EXPECTED:  NO DRUGS EXPECTED  ________________________________________________________________  CONSISTENT with medications provided:  NO DRUGS DETECTED  ________________________________________________________________  INTERPRETIVE INFORMATION: Pain Mgt Alanis, Mass Spec/EMIT, Ur,                           Interp  Interpretation depends on accuracy and completeness of patient  medication information submitted by client.  6-Acetylmorphine, Ur 12/24/2017 Not Detected   Final    7-Aminoclonazepam, Urine 12/24/2017 Not Detected   Final    Alpha-OH-Alpraz, Urine 12/24/2017 Not Detected   Final    Alprazolam, Urine 12/24/2017 Not Detected   Final    Amphetamines, urine 12/24/2017 Not Detected   Final    Barbiturates, Ur 12/24/2017 Not Detected   Final    Benzoylecgonine, Ur 12/24/2017 Not Detected   Final    Buprenorphine Urine 12/24/2017 Not Detected   Final    Carisoprodol, Ur 12/24/2017 Not Detected   Final    Comment: (NOTE)  The carisoprodol immunoassay has cross-reactivity to carisoprodol  and meprobamate.       Clonazepam, Urine 12/24/2017 Not Detected   Final    Codeine, Urine 12/24/2017 Not Detected   Final    MDA, Ur 12/24/2017 Not Detected   Final    Diazepam, Urine 12/24/2017 Not Detected   Final    Ethyl Glucuronide Ur 12/24/2017 Not Detected   Final    Fentanyl, Ur 12/24/2017 Not Detected   Final    Hydrocodone, Urine 12/24/2017 Not Detected   Final    Hydromorphone, Urine 12/24/2017 Not Detected   Final    Lorazepam, Urine 12/24/2017 Not Detected   Final    Marijuana Metab, Ur 12/24/2017 Not Detected   Final    MDEA, ANJANA, Ur 12/24/2017 Not Detected   Final    MDMA URINE 12/24/2017 Not Detected   Final    Meperidine Metab, Ur 12/24/2017 Not Detected   Final    Methadone, Urine 12/24/2017 Not Detected   Final    Methamphetamine, Urine 12/24/2017 Not Detected   Final    Methylphenidate 12/24/2017 Not Detected   Final    Midazolam, Urine 12/24/2017 Not Detected   Final    Morphine Urine 12/24/2017 Not Detected   Final    Norbuprenorphine, Urine 12/24/2017 Not Detected   Final    Nordiazepam, Urine 12/24/2017 Not Detected   Final    Norfentanyl, Urine 12/24/2017 Not Detected   Final    NORHYDROCODONE, URINE 12/24/2017 Not Detected   Final    Noroxycodone, Urine 12/24/2017 Not Detected   Final    NOROXYMORPHONE, URINE 12/24/2017 Not Detected   Final    Oxazepam, Urine 12/24/2017 Not Detected   Final    Oxycodone Urine 12/24/2017 Not Detected   Final    Oxymorphone, Urine 12/24/2017 Not Detected   Final    PCP, Urine 12/24/2017 Not Detected   Final    Phentermine, Ur 12/24/2017 Not Detected   Final    Propoxyphene, Urine 12/24/2017 Not Detected   Final    Tapentadol-O-Sulfate, Urine 12/24/2017 Not Detected   Final    Tapentadol, Urine 12/24/2017 Not Detected   Final    Temazepam, Urine 12/24/2017 Not Detected   Final    Tramadol, Urine 12/24/2017 Not Detected   Final    Zolpidem, Urine 12/24/2017 Not Detected   Final    Drugs Expected, Ur 12/24/2017 NO MEDICATION   Final    Creatinine, Ur 12/24/2017 216.0  20.0 - 400.0 mg/dL Final    Pain Mgt Drug Panel, Hi Res, Ur 12/24/2017 See Below   Final    Comment: (NOTE)  Methodology: Qualitative Enzyme Immunoassay and Qualitative Liquid  Chromatography-Time of Flight-Mass Spectrometry or Tandem Mass  Spectrometry, Quantitative Spectrophotometry  The absence of expected drug(s) and/or drug metabolite(s) may  indicate non-compliance, inappropriate timing of specimen  collection relative to drug administration, poor drug absorption,  diluted/adulterated urine, or limitations of testing. The  concentration must be greater than or equal to the cutoff to be  reported as present. If specific drug concentrations are  required, contact the laboratory within two weeks of specimen  collection to request quantification by a second analytical  technique. Interpretive questions should be directed to the  laboratory.   Results based on immunoassay Alcohol use No    Drug use: No    Sexual activity: Not Currently     Partners: Male     Other Topics Concern    Not on file     Social History Narrative    No narrative on file     Counseling given: Yes        Family History   Problem Relation Age of Onset    Diabetes Paternal Grandmother     Heart Failure Maternal Grandmother     Heart Failure Maternal Grandfather     Diabetes Father              -rest of complaints with corresponding details per ROS    The patient's past medical, surgical, social, and family history as well as her current medications and allergies were reviewed as documented in today's encounter. Review of Systems   Constitutional: Positive for activity change. Negative for appetite change, chills, fatigue, fever and unexpected weight change. HENT: Negative for congestion, postnasal drip, rhinorrhea, sinus pain, sinus pressure, sneezing and sore throat. Eyes: Negative for photophobia, redness and visual disturbance. Respiratory: Negative for cough, shortness of breath and wheezing. Cardiovascular: Negative for chest pain and palpitations. Gastrointestinal: Negative for abdominal pain, blood in stool, constipation, diarrhea, nausea and rectal pain. Endocrine: Negative for polydipsia, polyphagia and polyuria. Genitourinary: Negative for difficulty urinating, dysuria, flank pain, pelvic pain, urgency, vaginal discharge and vaginal pain. Musculoskeletal: Positive for arthralgias, back pain and gait problem. Negative for joint swelling and myalgias. Neurological: Positive for weakness and numbness. Negative for headaches. Psychiatric/Behavioral: Negative for behavioral problems and decreased concentration. The patient is not nervous/anxious and is not hyperactive.       Positive for urinary incontinence and perianal numbness      Physical Exam    PHYSICAL EXAM:   VITALS:   Vitals:    01/09/18 1004   BP: 128/84   Pulse: 106     GENERAL:  Patient is a well-developed, well-nourished female  in no acute distress, alert and oriented x3, appropriate and pleasant conversation. HEAD: Normocephalic, atraumatic. EYES: Pupils equal, round and reactive to light and accommodation, extraocular   movements intact. ENT: Moist mucous membranes. No erythema is noted. NECK: Supple. No masses. No lymphadenopathy. CARDIOVASCULAR: Regular rate and rhythm. PULMONARY: Lungs are clear to auscultation bilaterally. ABDOMEN: Soft, nontender, nondistended. Positive bowel sounds. MUSCULOSKELETAL:Tender lower lumbar spine, SLR positive, decreased sensations. NEUROLOGIC: Cranial nerves II through XII grossly intact. No focal deficits are noted. ASSESSMENT AND PLAN      1. Lumbar disc herniation  -Discussed with patient that we will give a call to neurosurgeon office and we will try to get in so on, in the meantime,  if symptoms got worse go to the ER. 2. Lumbar radiculopathy  -We will give her a call after scheduled appointment with the neurosurgeon. Continue same medications for now     3. Subclinical hypothyroidism  -Repeat blood work ordered stable continue same medications     4. Mild intermittent reactive airway disease without complication  -Stable continue same medications       No orders of the defined types were placed in this encounter. There are no discontinued medications. Lissett Quiros received counseling on the following healthy behaviors: nutrition, exercise and medication adherence  Reviewed prior labs and health maintenance  Continue current medications, diet and exercise. Discussed use, benefit, and side effects of prescribed medications. Barriers to medication compliance addressed. Patient given educational materials - see patient instructions  Was a self-tracking handout given in paper form or via [facility]? Yes    Requested Prescriptions      No prescriptions requested or ordered in this encounter       All patient questions answered.   Patient voiced

## 2018-01-12 ENCOUNTER — HOSPITAL ENCOUNTER (OUTPATIENT)
Dept: GENERAL RADIOLOGY | Age: 28
Discharge: HOME | End: 2018-01-12
Payer: COMMERCIAL

## 2018-01-12 ENCOUNTER — HOSPITAL ENCOUNTER (OUTPATIENT)
Dept: PAIN MANAGEMENT | Age: 28
Discharge: HOME | End: 2018-01-12
Payer: COMMERCIAL

## 2018-01-12 VITALS
SYSTOLIC BLOOD PRESSURE: 109 MMHG | BODY MASS INDEX: 34.22 KG/M2 | HEIGHT: 70 IN | OXYGEN SATURATION: 100 % | RESPIRATION RATE: 16 BRPM | DIASTOLIC BLOOD PRESSURE: 68 MMHG | TEMPERATURE: 98.6 F | WEIGHT: 239 LBS | HEART RATE: 101 BPM

## 2018-01-12 DIAGNOSIS — R52 PAIN: ICD-10-CM

## 2018-01-12 PROBLEM — M51.26 LUMBAR DISC HERNIATION: Chronic | Status: ACTIVE | Noted: 2017-11-28

## 2018-01-12 PROCEDURE — 6360000004 HC RX CONTRAST MEDICATION

## 2018-01-12 PROCEDURE — 99152 MOD SED SAME PHYS/QHP 5/>YRS: CPT | Performed by: ANESTHESIOLOGY

## 2018-01-12 PROCEDURE — 64484 NJX AA&/STRD TFRM EPI L/S EA: CPT | Performed by: ANESTHESIOLOGY

## 2018-01-12 PROCEDURE — 2500000003 HC RX 250 WO HCPCS

## 2018-01-12 PROCEDURE — 64483 NJX AA&/STRD TFRM EPI L/S 1: CPT

## 2018-01-12 PROCEDURE — 6360000002 HC RX W HCPCS

## 2018-01-12 PROCEDURE — 64483 NJX AA&/STRD TFRM EPI L/S 1: CPT | Performed by: ANESTHESIOLOGY

## 2018-01-12 PROCEDURE — 3209999900 FLUORO FOR SURGICAL PROCEDURES

## 2018-01-12 PROCEDURE — 64484 NJX AA&/STRD TFRM EPI L/S EA: CPT

## 2018-01-12 ASSESSMENT — PAIN DESCRIPTION - PROGRESSION: CLINICAL_PROGRESSION: GRADUALLY WORSENING

## 2018-01-12 ASSESSMENT — PAIN DESCRIPTION - ORIENTATION: ORIENTATION: LEFT

## 2018-01-12 ASSESSMENT — PAIN DESCRIPTION - ONSET: ONSET: ON-GOING

## 2018-01-12 ASSESSMENT — PAIN DESCRIPTION - DESCRIPTORS: DESCRIPTORS: ACHING;CONSTANT;DULL;JABBING

## 2018-01-12 ASSESSMENT — PAIN DESCRIPTION - DIRECTION: RADIATING_TOWARDS: DOWN BOTH LEGS

## 2018-01-12 ASSESSMENT — PAIN SCALES - GENERAL: PAINLEVEL_OUTOF10: 6

## 2018-01-12 ASSESSMENT — PAIN DESCRIPTION - LOCATION: LOCATION: BACK;HIP;LEG

## 2018-01-12 ASSESSMENT — PAIN DESCRIPTION - PAIN TYPE: TYPE: CHRONIC PAIN

## 2018-01-12 NOTE — H&P
Office note dated December 19, 2017 in Epic with all required elements of H&P    Patient seen preop, chart reviewed, AAO x 3, in NAD, VSS,. No changes in medical history since last evaluation. Risk / Benefits explained to patient, patient agree to proceed with plan.   ASA 2  MP 2

## 2018-01-24 ENCOUNTER — INITIAL CONSULT (OUTPATIENT)
Dept: NEUROSURGERY | Age: 28
End: 2018-01-24
Payer: COMMERCIAL

## 2018-01-24 VITALS
DIASTOLIC BLOOD PRESSURE: 77 MMHG | BODY MASS INDEX: 34.07 KG/M2 | HEIGHT: 70 IN | SYSTOLIC BLOOD PRESSURE: 95 MMHG | WEIGHT: 238 LBS | HEART RATE: 85 BPM

## 2018-01-24 DIAGNOSIS — M51.16 LUMBAR DISC DISEASE WITH RADICULOPATHY: Primary | ICD-10-CM

## 2018-01-24 DIAGNOSIS — R29.898 UPPER EXTREMITY DYSFUNCTION: ICD-10-CM

## 2018-01-24 DIAGNOSIS — R32 INCONTINENCE OF URINE IN FEMALE: ICD-10-CM

## 2018-01-24 DIAGNOSIS — M41.27 OTHER IDIOPATHIC SCOLIOSIS, LUMBOSACRAL REGION: ICD-10-CM

## 2018-01-24 PROCEDURE — G8484 FLU IMMUNIZE NO ADMIN: HCPCS | Performed by: NEUROLOGICAL SURGERY

## 2018-01-24 PROCEDURE — G8417 CALC BMI ABV UP PARAM F/U: HCPCS | Performed by: NEUROLOGICAL SURGERY

## 2018-01-24 PROCEDURE — 99203 OFFICE O/P NEW LOW 30 MIN: CPT | Performed by: NEUROLOGICAL SURGERY

## 2018-01-24 PROCEDURE — G8427 DOCREV CUR MEDS BY ELIG CLIN: HCPCS | Performed by: NEUROLOGICAL SURGERY

## 2018-01-24 PROCEDURE — 1036F TOBACCO NON-USER: CPT | Performed by: NEUROLOGICAL SURGERY

## 2018-01-24 NOTE — PROGRESS NOTES
disease      Past Surgical History:   Procedure Laterality Date     SECTION  4/11/10     Family History   Problem Relation Age of Onset    Diabetes Paternal Grandmother     Heart Failure Maternal Grandmother     Heart Failure Maternal Grandfather     Diabetes Father      Current Outpatient Prescriptions on File Prior to Visit   Medication Sig Dispense Refill    thyroid (ARMOUR) 120 MG tablet Take 1 tablet by mouth daily 30 tablet 3    gabapentin (NEURONTIN) 100 MG capsule Take 1 capsule by mouth daily 90 capsule 3    diclofenac sodium 1 % GEL Apply 2 g topically 2 times daily 1 Tube 3    naproxen (NAPROSYN) 500 MG tablet Take 1 tablet by mouth 2 times daily (with meals) 60 tablet 3    tiZANidine (ZANAFLEX) 2 MG capsule Take 1 capsule by mouth 3 times daily 30 capsule 0    Calcium Carbonate-Vitamin D (OYSTER SHELL CALCIUM/D) 500-200 MG-UNIT TABS Take 1 tablet by mouth daily 30 tablet 3    loratadine (CLARITIN) 10 MG tablet Take 1 tablet by mouth daily 30 tablet 2    fluticasone (FLONASE) 50 MCG/ACT nasal spray 1 spray by Nasal route daily 1 Bottle 3    albuterol sulfate  (90 BASE) MCG/ACT inhaler Inhale 2 puffs into the lungs every 6 hours as needed for Wheezing or Shortness of Breath 1 Inhaler 1    Respiratory Therapy Supplies (BREATHERITE VALVED MDI CHAMBER) RICHARD To be used with inhaler 1 Device 1    clobetasol prop emollient base (CLOBETASOL PROPIONATE E) 0.05 % CREA Apply topically 2 times daily 1 Tube 1     No current facility-administered medications on file prior to visit. Social History   Substance Use Topics    Smoking status: Never Smoker    Smokeless tobacco: Never Used    Alcohol use No       Allergies   Allergen Reactions    Latex        Review of Systems  Constitutional: Negative for activity change and appetite change. HENT: Negative for ear pain and facial swelling. Eyes: Negative for discharge and itching.    Respiratory: Negative for choking and chest tightness. Cardiovascular: Negative for chest pain and leg swelling. Gastrointestinal: Negative for nausea and abdominal pain. Endocrine: Negative for cold intolerance and heat intolerance. Genitourinary: Negative for frequency and flank pain. Musculoskeletal: Negative for myalgias and joint swelling. Skin: Negative for rash and wound. Allergic/Immunologic: Negative for environmental allergies and food allergies. Hematological: Negative for adenopathy. Does not bruise/bleed easily. Psychiatric/Behavioral: Negative for self-injury. The patient is not nervous/anxious. Physical Exam:      BP 95/77 (Site: Left Arm, Position: Sitting, Cuff Size: Large Adult)   Pulse 85   Ht 5' 10\" (1.778 m)   Wt 238 lb (108 kg)   LMP 12/26/2017 (Approximate)   BMI 34.15 kg/m²   Estimated body mass index is 34.15 kg/m² as calculated from the following:    Height as of this encounter: 5' 10\" (1.778 m). Weight as of this encounter: 238 lb (108 kg). General:  Yamila Caban is a 28y.o. year old female who appears her stated age. HEENT: Normocephalic atraumatic. Neck supple. Chest: regular rate; pulses equal  Abdomen: Soft nontender nondistended. Normoactive bowel sounds. Neurologic Exam AO times 3. cranial nerves II 12 are intact. 5 bilateral biceps triceps deltoid  iliopsoas car says plantar flexion was flexed and EHL. Minimal diminished sensation lateral aspect of the left lower foot. Reflexes 2 out of 4 bilateral Achilles. 3/4 patellar reflexes. No Radha's no clonus 2 out of 4 upper extremity reflexes. Studies Review:     MRI lumbar spine reveals L4 5 degenerative disc with some moderate left-sided foraminal stenosis. No central canal stenosis evident   Assessment and Plan:      1. Lumbar disc disease with radiculopathy    2. Other idiopathic scoliosis, lumbosacral region    3. Incontinence of urine in female    4.  Upper extremity dysfunction          Plan:   Really the patient's lumbar spine and imaging findings I explained her that she does have focal disease with left-sided radicular is explained by the mild foraminal stenosis. Considering the patient's age and overall course I definitely do not think that she is overtly surgical at this time. I made it very clear to her that she does not have a ominous red flag signs and symptoms and does not need to have surgery for her lumbar spine. Despite the fact that she has had chiropractic care over the last year in addition to just one injection recently I do not think that she has really had an adequate aggressive conservative trial of management. My general approach is to refer patients with nonsurgical spine disease to the physiatry standing our practice. I will have them overtaken manage the conservative management in terms of physical therapy any further interventional procedures that they feel I warranted. Needed patient still has refractory symptoms despite this course of conservative management as seen by physical medicine I will see her back for consideration of surgical intervention. Regarding the incontinence of bladder as well as stool urgency, I will obtain an MRI of the thoracic spine to rule out any cord compression considering that she does have some lower extremity hyperreflexia as well. An additional level obtain a cervical spine MRI considering that she does have subjective signs of myelopathy of her arms in addition to the complaints of incontinence lower ext hyperreflexia. I think it is well worth obtaining MRI of the rest of her spine considering the long track signs and subjective complaints. In the meantime I think it is still worth obtaining a urology referral in order to evaluate her for these episodes of incontinence. I will leave this to the PCP's discussion with the patient. Followup: No Follow-up on file. Prescriptions Ordered:  No orders of the defined types were placed in this encounter.        Orders

## 2018-01-26 ENCOUNTER — HOSPITAL ENCOUNTER (OUTPATIENT)
Dept: PAIN MANAGEMENT | Age: 28
Discharge: HOME | End: 2018-01-26
Payer: COMMERCIAL

## 2018-01-26 VITALS
OXYGEN SATURATION: 98 % | HEIGHT: 70 IN | TEMPERATURE: 98.1 F | RESPIRATION RATE: 16 BRPM | WEIGHT: 238 LBS | BODY MASS INDEX: 34.07 KG/M2 | HEART RATE: 88 BPM | DIASTOLIC BLOOD PRESSURE: 80 MMHG | SYSTOLIC BLOOD PRESSURE: 130 MMHG

## 2018-01-26 DIAGNOSIS — M54.16 LUMBAR RADICULOPATHY: Primary | ICD-10-CM

## 2018-01-26 DIAGNOSIS — M51.26 LUMBAR DISC HERNIATION: ICD-10-CM

## 2018-01-26 PROCEDURE — 99213 OFFICE O/P EST LOW 20 MIN: CPT

## 2018-01-26 PROCEDURE — 99214 OFFICE O/P EST MOD 30 MIN: CPT | Performed by: PAIN MEDICINE

## 2018-01-26 ASSESSMENT — PAIN DESCRIPTION - ONSET: ONSET: ON-GOING

## 2018-01-26 ASSESSMENT — ENCOUNTER SYMPTOMS
VOMITING: 0
NAUSEA: 0
SINUS PAIN: 0
EYES NEGATIVE: 1
HEARTBURN: 1
BACK PAIN: 1
RESPIRATORY NEGATIVE: 1
SORE THROAT: 0
SHORTNESS OF BREATH: 0
COUGH: 0
BLURRED VISION: 0

## 2018-01-26 ASSESSMENT — PAIN DESCRIPTION - LOCATION: LOCATION: BACK;LEG

## 2018-01-26 ASSESSMENT — PAIN DESCRIPTION - PROGRESSION: CLINICAL_PROGRESSION: GRADUALLY WORSENING

## 2018-01-26 ASSESSMENT — PAIN SCALES - GENERAL: PAINLEVEL_OUTOF10: 5

## 2018-01-26 ASSESSMENT — PAIN DESCRIPTION - DIRECTION: RADIATING_TOWARDS: RIGHT LEG WORSE THAN LEFT LEG

## 2018-01-26 ASSESSMENT — PAIN DESCRIPTION - ORIENTATION: ORIENTATION: LEFT;RIGHT;LOWER

## 2018-01-26 ASSESSMENT — PAIN DESCRIPTION - FREQUENCY: FREQUENCY: CONTINUOUS

## 2018-01-26 ASSESSMENT — PAIN DESCRIPTION - PAIN TYPE: TYPE: CHRONIC PAIN

## 2018-01-26 NOTE — PROGRESS NOTES
  SECTION  4/11/10       Medications  Current Outpatient Prescriptions   Medication Sig Dispense Refill    thyroid (ARMOUR) 120 MG tablet Take 1 tablet by mouth daily 30 tablet 3    gabapentin (NEURONTIN) 100 MG capsule Take 1 capsule by mouth daily 90 capsule 3    diclofenac sodium 1 % GEL Apply 2 g topically 2 times daily 1 Tube 3    naproxen (NAPROSYN) 500 MG tablet Take 1 tablet by mouth 2 times daily (with meals) 60 tablet 3    tiZANidine (ZANAFLEX) 2 MG capsule Take 1 capsule by mouth 3 times daily 30 capsule 0    Calcium Carbonate-Vitamin D (OYSTER SHELL CALCIUM/D) 500-200 MG-UNIT TABS Take 1 tablet by mouth daily 30 tablet 3    loratadine (CLARITIN) 10 MG tablet Take 1 tablet by mouth daily 30 tablet 2    fluticasone (FLONASE) 50 MCG/ACT nasal spray 1 spray by Nasal route daily 1 Bottle 3    clobetasol prop emollient base (CLOBETASOL PROPIONATE E) 0.05 % CREA Apply topically 2 times daily 1 Tube 1    albuterol sulfate  (90 BASE) MCG/ACT inhaler Inhale 2 puffs into the lungs every 6 hours as needed for Wheezing or Shortness of Breath 1 Inhaler 1    Respiratory Therapy Supplies (BREATHERITE VALVED MDI CHAMBER) RICHARD To be used with inhaler 1 Device 1     No current facility-administered medications for this encounter. Allergies  Latex and Nut [peanut-containing drug products]    Family History  family history includes Diabetes in her father and paternal grandmother; Heart Failure in her maternal grandfather and maternal grandmother.     Social History  Social History     Social History    Marital status:      Spouse name: N/A    Number of children: N/A    Years of education: N/A     Social History Main Topics    Smoking status: Never Smoker    Smokeless tobacco: Never Used    Alcohol use No    Drug use: No    Sexual activity: Not Currently     Partners: Male     Other Topics Concern    None     Social History Narrative    None      reports that she does not Range & Units Status Collected Lab   Pain Management Drug Panel Interp, Urine Consistent   Final 2017  2:20 PM [facility]   (NOTE)   ________________________________________________________________   DRUGS EXPECTED:   NO DRUGS EXPECTED   ________________________________________________________________   CONSISTENT with medications provided:   NO DRUGS DETECTED   ________________________________________________________________   INTERPRETIVE INFORMATION: Pain Mgt Alanis, Mass Spec/EMIT, Ur,          Imaging:  Radiology Images and Reports reviewed where indicated and necessary         Patient Active Problem List   Diagnosis    Screening for malignant neoplasm of cervix    High-risk pregnancy    Reactive airway disease    Allergic sinusitis    Psoriasis    Neck pain    Annual physical exam    Subclinical hypothyroidism    Sore throat    Enlarged lymph node    Memory difficulties    Chronic fatigue    Dry mouth    Seasonal allergic rhinitis due to pollen    Anti-RNP antibodies present    Positive TOE (antinuclear antibody)    Lumbar disc herniation    Lumbar radiculopathy    Prediabetes        ASSESSMENT    Yang Patel is a 28 y.o. female with the followin. Lumbar radiculopathy    2. Lumbar disc herniation         PLAN    The following treatment plan was developed after discussion with patient:    We discussed Transforaminal Lumbar Epidural steroid Injections x 2 at L4 and S1 on the right side. Patient tried and failed NSAIDS,Home exercises, Physical Therapy, Chiropractic manipulations without relief. Patient had transforaminal injections on the left side with good improvement in the pain in her pain at present is on the right side. Patient exhibited signs of radiculopathy with positive straight leg raising test on right    Patient has not had prior Lumbar Surgery.     We will see the patient in 2 weeks after the procedures and re-evaluate symptoms      Counselling/Preventive measures for pain Control:    [x]  Spine strengthening exercises are discussed with patient in detail. Patient is a slender woman importance of exercise and core strengthening. We'll refer her to physical therapy also. Orders Placed This Encounter   Procedures    PT aquatic therapy     Standing Status:   Future     Standing Expiration Date:   1/26/2019       Decision Making Process : Patient's health history and referral records thoroughly reviewed before focused physical examination and discussion with patient. Over 50% of today's visit is spent on examining the patient and counseling. Level of complexity of date to be reviewed is Moderate. The chart date reviewed include the following: Imaging Reports. Summary of Care. Time spent reviewing with patient the below reports:   Medication safety, Treatment options. Level of diagnosis and management options of this case is multiple: involving the following management options: Interventions as needed, medication management as appropriate, future visits, activity modification, heat/ice as needed, Urine drug screen as required. [x]The patient's questions were answered to the best of my abilities. This note was created using voice recognition software. There may be inaccuracies of transcription  that are inadvertently overlooked prior to the signature. There is any questions about the transcription please contact me. Andrea Lucero MD      NAME:  Frank Jerome  MRN: [id number]   YOB: 1990   Date: 1/27/2018   Age: 28 y.o. Gender: female       Frank Jerome is 28 y.o. ,female, referred because of Back Pain (right leg worse than left)        I Performed a history and physical examination of the patient and discussed management with the resident/ Physician Assistant/ Nurse Practitioner. I reviewed the Resident/ Physician Assistant/ Nurse Practitioner note and agree with the documented findings and plan of care. Any areas of disagreement are noted on the chart. I was present for any key portions of any procedure. I have documented in the chart those procedures where I was not present during key Portions. I agree with the chief complaint, past medical history, past surgical history, Social & family history, Allergies, medications as documented unless noted below. I have personally evaluated this patient and have completed at least one if not all key elements of the (History, physical exam and plan of care).    Additional findings are added to the note above    Cierra Loyd MD on 1/27/2018 at 12:31 PM

## 2018-01-27 ASSESSMENT — ENCOUNTER SYMPTOMS: PHOTOPHOBIA: 0

## 2018-02-06 ENCOUNTER — HOSPITAL ENCOUNTER (OUTPATIENT)
Dept: MRI IMAGING | Age: 28
Discharge: HOME | End: 2018-02-08
Payer: COMMERCIAL

## 2018-02-06 DIAGNOSIS — R29.898 UPPER EXTREMITY DYSFUNCTION: ICD-10-CM

## 2018-02-06 DIAGNOSIS — R32 INCONTINENCE OF URINE IN FEMALE: ICD-10-CM

## 2018-02-06 PROCEDURE — 72146 MRI CHEST SPINE W/O DYE: CPT

## 2018-02-06 PROCEDURE — 72141 MRI NECK SPINE W/O DYE: CPT

## 2018-02-08 DIAGNOSIS — R32 INCONTINENCE IN FEMALE: Primary | ICD-10-CM

## 2018-02-13 ENCOUNTER — HOSPITAL ENCOUNTER (OUTPATIENT)
Dept: GENERAL RADIOLOGY | Age: 28
Discharge: HOME | End: 2018-02-15
Payer: COMMERCIAL

## 2018-02-13 ENCOUNTER — HOSPITAL ENCOUNTER (OUTPATIENT)
Dept: PAIN MANAGEMENT | Age: 28
Discharge: HOME | End: 2018-02-13
Payer: COMMERCIAL

## 2018-02-13 VITALS
OXYGEN SATURATION: 100 % | SYSTOLIC BLOOD PRESSURE: 135 MMHG | RESPIRATION RATE: 16 BRPM | BODY MASS INDEX: 34.07 KG/M2 | HEART RATE: 88 BPM | TEMPERATURE: 98.1 F | WEIGHT: 238 LBS | HEIGHT: 70 IN | DIASTOLIC BLOOD PRESSURE: 66 MMHG

## 2018-02-13 DIAGNOSIS — M54.16 LEFT LUMBAR RADICULITIS: ICD-10-CM

## 2018-02-13 DIAGNOSIS — M54.16 LUMBAR RADICULOPATHY: ICD-10-CM

## 2018-02-13 DIAGNOSIS — M51.26 LUMBAR DISC HERNIATION: ICD-10-CM

## 2018-02-13 DIAGNOSIS — M54.16 LUMBAR RADICULOPATHY: Primary | ICD-10-CM

## 2018-02-13 PROCEDURE — 6360000004 HC RX CONTRAST MEDICATION

## 2018-02-13 PROCEDURE — 64484 NJX AA&/STRD TFRM EPI L/S EA: CPT | Performed by: PAIN MEDICINE

## 2018-02-13 PROCEDURE — 64483 NJX AA&/STRD TFRM EPI L/S 1: CPT

## 2018-02-13 PROCEDURE — 64483 NJX AA&/STRD TFRM EPI L/S 1: CPT | Performed by: PAIN MEDICINE

## 2018-02-13 PROCEDURE — 2500000003 HC RX 250 WO HCPCS

## 2018-02-13 PROCEDURE — 64484 NJX AA&/STRD TFRM EPI L/S EA: CPT

## 2018-02-13 PROCEDURE — 3209999900 FLUORO FOR SURGICAL PROCEDURES

## 2018-02-13 PROCEDURE — 6360000002 HC RX W HCPCS

## 2018-02-13 ASSESSMENT — PAIN - FUNCTIONAL ASSESSMENT
PAIN_FUNCTIONAL_ASSESSMENT: 0-10
PAIN_FUNCTIONAL_ASSESSMENT: 0-10

## 2018-02-14 ENCOUNTER — TELEPHONE (OUTPATIENT)
Dept: PAIN MANAGEMENT | Age: 28
End: 2018-02-14

## 2018-02-14 DIAGNOSIS — M51.26 LUMBAR DISC HERNIATION: ICD-10-CM

## 2018-02-15 ENCOUNTER — OFFICE VISIT (OUTPATIENT)
Dept: FAMILY MEDICINE CLINIC | Age: 28
End: 2018-02-15
Payer: COMMERCIAL

## 2018-02-15 VITALS
SYSTOLIC BLOOD PRESSURE: 130 MMHG | HEIGHT: 70 IN | DIASTOLIC BLOOD PRESSURE: 80 MMHG | WEIGHT: 241 LBS | BODY MASS INDEX: 34.5 KG/M2 | OXYGEN SATURATION: 99 % | HEART RATE: 77 BPM

## 2018-02-15 DIAGNOSIS — J45.20 MILD INTERMITTENT REACTIVE AIRWAY DISEASE WITHOUT COMPLICATION: ICD-10-CM

## 2018-02-15 DIAGNOSIS — M51.26 LUMBAR DISC HERNIATION: ICD-10-CM

## 2018-02-15 DIAGNOSIS — E03.8 SUBCLINICAL HYPOTHYROIDISM: ICD-10-CM

## 2018-02-15 DIAGNOSIS — M54.16 LUMBAR RADICULOPATHY: Primary | ICD-10-CM

## 2018-02-15 PROBLEM — J02.9 SORE THROAT: Status: RESOLVED | Noted: 2017-05-03 | Resolved: 2018-02-15

## 2018-02-15 PROCEDURE — 1036F TOBACCO NON-USER: CPT | Performed by: FAMILY MEDICINE

## 2018-02-15 PROCEDURE — G8484 FLU IMMUNIZE NO ADMIN: HCPCS | Performed by: FAMILY MEDICINE

## 2018-02-15 PROCEDURE — G8427 DOCREV CUR MEDS BY ELIG CLIN: HCPCS | Performed by: FAMILY MEDICINE

## 2018-02-15 PROCEDURE — G8417 CALC BMI ABV UP PARAM F/U: HCPCS | Performed by: FAMILY MEDICINE

## 2018-02-15 PROCEDURE — 99214 OFFICE O/P EST MOD 30 MIN: CPT | Performed by: FAMILY MEDICINE

## 2018-02-15 RX ORDER — GABAPENTIN 300 MG/1
300 CAPSULE ORAL DAILY
Qty: 90 CAPSULE | Refills: 0 | Status: SHIPPED | OUTPATIENT
Start: 2018-02-15 | End: 2018-03-29 | Stop reason: SDUPTHER

## 2018-02-15 ASSESSMENT — ENCOUNTER SYMPTOMS
WHEEZING: 0
SHORTNESS OF BREATH: 0
DIARRHEA: 0
RHINORRHEA: 0
BACK PAIN: 1
SINUS PRESSURE: 0
CONSTIPATION: 0

## 2018-02-15 NOTE — PROGRESS NOTES
Visit Information    Have you changed or started any medications since your last visit including any over-the-counter medicines, vitamins, or herbal medicines? no   Are you having any side effects from any of your medications? -  no  Have you stopped taking any of your medications? Is so, why? -  no    Have you seen any other physician or provider since your last visit? No  Have you had any other diagnostic tests since your last visit? No  Have you been seen in the emergency room and/or had an admission to a hospital since we last saw you? No  Have you had your routine dental cleaning in the past 6 months? no    Have you activated your [employer] account? If not, what are your barriers?  Yes     Patient Care Team:  Leonardo Correa MD as PCP - General (Family Medicine)    Medical History Review  Past Medical, Family, and Social History reviewed and does contribute to the patient presenting condition    Health Maintenance   Topic Date Due    Cervical cancer screen  04/04/2010    Pneumococcal med risk (1 of 1 - PPSV23) 03/01/2018 (Originally 4/4/2008)    Flu vaccine (1) 08/01/2018 (Originally 9/1/2017)    A1C test (Diabetic or Prediabetic)  11/28/2018    TSH testing  01/09/2019    DTaP/Tdap/Td vaccine (2 - Td) 12/27/2026    HIV screen  Completed

## 2018-02-15 NOTE — PROGRESS NOTES
01/09/2018 8.01* 0.30 - 5.00 mIU/L Final    Comment: Performed at [facility]: [address] [facility]. [address]   [phone]      Thyroxine, Free 01/09/2018 0.87* 0.93 - 1.70 ng/dL Final    Comment: Performed at [facility]: [address] [facility]. [address]   [phone]           Most recent labs reviewed:     Lab Results   Component Value Date    WBC 6.6 05/31/2017    HGB 13.3 05/31/2017    HCT 39.8 05/31/2017    MCV 85.6 05/31/2017     05/31/2017       Lab Results   Component Value Date     05/31/2017    K 4.1 05/31/2017     05/31/2017    CO2 27 05/31/2017    BUN 8 05/31/2017    CREATININE 0.84 05/31/2017    GLUCOSE 108 05/31/2017    CALCIUM 9.2 05/31/2017        Lab Results   Component Value Date    ALT 16 05/31/2017    AST 17 05/31/2017    ALKPHOS 82 05/31/2017    BILITOT 0.44 05/31/2017       Lab Results   Component Value Date    TSH 8.01 (H) 01/09/2018       No results found for: CHOL  No results found for: TRIG  No results found for: HDL  No results found for: LDLCALC, LDLCHOLESTEROL  No results found for: LABVLDL, VLDL  No results found for: [facility]    Lab Results   Component Value Date    LABA1C 5.3 11/28/2017       Lab Results   Component Value Date    IOMIKHTH64 320 05/31/2017       Lab Results   Component Value Date    FOLATE 14.8 05/31/2017       No results found for: IRON, TIBC, FERRITIN    Lab Results   Component Value Date    VITD25 24.2 (L) 08/21/2017             Current Outpatient Prescriptions   Medication Sig Dispense Refill    gabapentin (NEURONTIN) 300 MG capsule Take 1 capsule by mouth daily for 30 days.  90 capsule 0    thyroid (ARMOUR) 120 MG tablet Take 1 tablet by mouth daily 30 tablet 3    diclofenac sodium 1 % GEL Apply 2 g topically 2 times daily 1 Tube 3    naproxen (NAPROSYN) 500 MG tablet Take 1 tablet by mouth 2 times daily (with meals) 60 tablet 3    tiZANidine (ZANAFLEX) 2 MG capsule Take 1 capsule by wheezing. Cardiovascular: Negative for chest pain and palpitations. Gastrointestinal: Negative for constipation and diarrhea. Genitourinary: Negative for dysuria, flank pain and genital sores. Musculoskeletal: Positive for arthralgias, back pain, gait problem, neck pain and neck stiffness. Skin: Negative for pallor and rash. Neurological: Positive for weakness and numbness. Negative for light-headedness. Psychiatric/Behavioral: Negative for decreased concentration, dysphoric mood, hallucinations and self-injury. The patient is not nervous/anxious. Physical Exam    PHYSICAL EXAM:   VITALS:   Vitals:    02/15/18 0900   BP: 130/80   Pulse: 77   SpO2: 99%     GENERAL:  Patient is a well-developed, well-nourished female  in no acute distress, alert and oriented x3, appropriate and pleasant conversation. HEAD: Normocephalic, atraumatic. EYES: Pupils equal, round and reactive to light and accommodation, extraocular   movements intact. ENT: Moist mucous membranes. No erythema is noted. NECK: Supple. No masses. No lymphadenopathy. CARDIOVASCULAR: Regular rate and rhythm. PULMONARY: Lungs are clear to auscultation bilaterally. ABDOMEN: Soft, nontender, nondistended. Positive bowel sounds. MUSCULOSKELETAL: Patient not able to sit at  1 spot for a longer time , she has been moving continuously due to pain  NEUROLOGIC: Cranial nerves II through XII grossly intact. No focal deficits are noted. ASSESSMENT AND PLAN      1. Lumbar radiculopathy  -Patient is still in pain, discussed with patient*physical rehabilitation and discuss with pain management if he can try some low-dose narcotic until he'll start doing some work. Increase the dose of Neurontin 300 mg  - gabapentin (NEURONTIN) 300 MG capsule; Take 1 capsule by mouth daily for 30 days. Dispense: 90 capsule; Refill: 0    2.  Lumbar disc herniation  -Increased dose of Neurontin to 300 mg, discussed with pain management about low

## 2018-02-22 DIAGNOSIS — M51.26 LUMBAR DISC HERNIATION: ICD-10-CM

## 2018-02-22 DIAGNOSIS — M54.16 LUMBAR RADICULOPATHY: ICD-10-CM

## 2018-02-22 DIAGNOSIS — E03.8 SUBCLINICAL HYPOTHYROIDISM: ICD-10-CM

## 2018-02-22 DIAGNOSIS — E55.9 VITAMIN D DEFICIENCY: ICD-10-CM

## 2018-02-22 RX ORDER — TIZANIDINE HYDROCHLORIDE 2 MG/1
2 CAPSULE, GELATIN COATED ORAL 3 TIMES DAILY
Qty: 30 CAPSULE | Refills: 0 | Status: SHIPPED | OUTPATIENT
Start: 2018-02-22 | End: 2018-02-28 | Stop reason: DRUGHIGH

## 2018-02-22 RX ORDER — B-COMPLEX WITH VITAMIN C
1 TABLET ORAL DAILY
Qty: 30 TABLET | Refills: 3 | Status: SHIPPED | OUTPATIENT
Start: 2018-02-22 | End: 2018-10-31 | Stop reason: SDUPTHER

## 2018-02-22 RX ORDER — LEVOTHYROXINE AND LIOTHYRONINE 76; 18 UG/1; UG/1
120 TABLET ORAL DAILY
Qty: 30 TABLET | Refills: 3 | Status: SHIPPED | OUTPATIENT
Start: 2018-02-22 | End: 2018-05-24 | Stop reason: SDUPTHER

## 2018-02-28 ENCOUNTER — HOSPITAL ENCOUNTER (OUTPATIENT)
Dept: PAIN MANAGEMENT | Age: 28
Discharge: HOME | End: 2018-02-28
Payer: COMMERCIAL

## 2018-02-28 VITALS
HEART RATE: 103 BPM | BODY MASS INDEX: 34.5 KG/M2 | RESPIRATION RATE: 16 BRPM | DIASTOLIC BLOOD PRESSURE: 75 MMHG | WEIGHT: 241 LBS | HEIGHT: 70 IN | TEMPERATURE: 97.7 F | SYSTOLIC BLOOD PRESSURE: 121 MMHG

## 2018-02-28 DIAGNOSIS — R76.8 ANTI-RNP ANTIBODIES PRESENT: ICD-10-CM

## 2018-02-28 DIAGNOSIS — M51.26 LUMBAR DISC HERNIATION: ICD-10-CM

## 2018-02-28 DIAGNOSIS — R76.8 POSITIVE ANA (ANTINUCLEAR ANTIBODY): ICD-10-CM

## 2018-02-28 DIAGNOSIS — M54.16 LEFT LUMBAR RADICULITIS: ICD-10-CM

## 2018-02-28 DIAGNOSIS — L40.9 PSORIASIS: ICD-10-CM

## 2018-02-28 DIAGNOSIS — M54.16 LUMBAR RADICULOPATHY: Primary | ICD-10-CM

## 2018-02-28 PROCEDURE — 99213 OFFICE O/P EST LOW 20 MIN: CPT | Performed by: PAIN MEDICINE

## 2018-02-28 PROCEDURE — 99203 OFFICE O/P NEW LOW 30 MIN: CPT

## 2018-02-28 RX ORDER — TIZANIDINE 4 MG/1
TABLET ORAL
Refills: 0 | COMMUNITY
Start: 2018-02-22 | End: 2018-10-31 | Stop reason: SDUPTHER

## 2018-02-28 ASSESSMENT — ENCOUNTER SYMPTOMS
SINUS PAIN: 0
COUGH: 1
NAUSEA: 1
SHORTNESS OF BREATH: 0
BACK PAIN: 1
SORE THROAT: 0
EYES NEGATIVE: 1
HEARTBURN: 1
BLURRED VISION: 0

## 2018-02-28 ASSESSMENT — PAIN DESCRIPTION - ORIENTATION: ORIENTATION: RIGHT;LEFT

## 2018-02-28 ASSESSMENT — PAIN SCALES - GENERAL: PAINLEVEL_OUTOF10: 6

## 2018-02-28 ASSESSMENT — PAIN DESCRIPTION - LOCATION: LOCATION: BACK;LEG

## 2018-02-28 ASSESSMENT — PAIN DESCRIPTION - DESCRIPTORS: DESCRIPTORS: CONSTANT

## 2018-02-28 ASSESSMENT — PAIN DESCRIPTION - PAIN TYPE: TYPE: CHRONIC PAIN

## 2018-02-28 NOTE — PROGRESS NOTES
[facility] Pain Management  Patient Pain Assessment  RECHECK - No att. providers found    Primary Care Physician: Andrew Gaffney MD    Chief complaint:   Chief Complaint   Patient presents with    Lower Back Pain     right leg worse   . HISTORY OF PRESENT ILLNESS:  Tatum Miller is 28 y.o. female with    Patient return to the pain clinic today with a chief complaint of pain involving the low back area with pain radiating into the right lower extremity. This patient had transforaminal lumbar epidural steroid injection on 2/13 on the left side and about 15% improvement in her pain. Her pain is now on the right side. She is going to start physical therapy but has not started it yet      Back Pain   This is a chronic problem. The current episode started more than 1 year ago. The problem occurs constantly. The problem has been gradually improving since onset. The pain is present in the lumbar spine. The quality of the pain is described as aching (Sharp at times). Radiates to: Lower extremity is slightly more on the right side is. The pain is at a severity of 6/10. The pain is moderate. The pain is the same all the time. The symptoms are aggravated by bending and standing (Lifting, walking, ADLs). Associated symptoms include headaches and tingling (hands left 2 fingers). Pertinent negatives include no chest pain, dysuria, fever, numbness or weakness. Risk factors include obesity. Treatments tried: Transforaminal lumbar epidural steroid injection. The treatment provided moderate relief.        OARRS compliant? not applicable  Concern for prescription abuse?not applicable    Current Pain Assessment  Pain Assessment  Pain Assessment: 0-10  Pain Level: 6  Pain Type: Chronic pain  Pain Location: Back, Leg  Pain Orientation: Right, Left  Pain Radiating Towards: leg leg worse than right leg  Pain Descriptors: Constant                    ADVERSE MEDICATION EFFECTS:   Constipation: no  Bowel Regimen: Yes  Diet: radiculopathy    Prediabetes        ASSESSMENT    Kevin Paez is a 28 y.o. female with     1. Lumbar radiculopathy    2. Lumbar disc herniation    3. Left lumbar radiculitis    4. Psoriasis    5. Anti-RNP antibodies present    6. Positive TEO (antinuclear antibody)         PLAN    At this time patient had some pain relief with the transforaminal lumbar epidural steroid injections unless cervical(. We will try to treat her conservatively for anemia. She is encouraged to participate in physical therapy. She still continues to have pain in the pain gets worse we will see her. She does have facet arthropathy-only minimal spinal stenosis. She also gives history of psoriasis. She may get some benefit from seeing a Rheumatologist      Counselling/Preventive measures for pain  Control:    [x]  Spine strengthening exercises are discussed with patient in detail. Patient is counseled on importance of exercise and,core strengthening. Some  specific exercises to strengthen the abdominal muscles and low back muscles Were discussed. Also aquatic (water) physical therapy and benefits were explained to patient. including \" Water supports the body and minimizes the effect of gravity, making it easier for patients to start an exercise program.\"   The following important principles were discussed with patient:  1. Limit Bed Rest--Studies show that people with short-term low-back pain who rest feel more pain and have a harder time with daily tasks than those who stay active. 2. Keep Exercising-patient is advised to stay away from strenuous activities like gardening and avoid whatever motion caused the pain in the first place.   3. Maintain Good Posture-Exercises  to maintain good posture were shown to patient. 4. To do exercises learned in PT regularly. [x]Information (handout) on exercise was  given to patient.      Decision Making Process : Patient's health history and referral records thoroughly reviewed before focused physical

## 2018-02-28 NOTE — PROCEDURES
Pre-Procedure Note    Patient Name: Eric Hall   YOB: 1990  Room/Bed: Room/bed info not found  Medical Record Number: [id number]  Date: 2018       Indication:   1. Lumbar radiculopathy    2. Lumbar disc herniation    3. Left lumbar radiculitis           Consent: On file. Vital Signs:   Vitals:    18 0950   BP: 135/66   Pulse: 88   Resp: 16   Temp:    SpO2: 100%       Past Medical History:   has a past medical history of Complication of anesthesia and Thyroid disease. Past Surgical History:   has a past surgical history that includes  section (4/11/10). Pre-Sedation Documentation and Exam:   Vital signs have been reviewed (see flow sheet for vitals). Mallampati Airway Assessment:  normal    ASA Classification:  Class 1 - A normal healthy patient    Sedation/ Anesthesia Plan:   intravenous sedation  as needed. Medications Planned:   midazolam (Versed) / Fentanyl  Intravenously  as needed. Patient is an appropriate candidate for plan of sedation: yes  Patient's History and Physical examination was reviewed and there is no change. Electronically signed by Kaylie Dumas MD on 2018 at 1:13 PM        Preoperative Diagnosis:    1. Lumbar radiculopathy    2. Lumbar disc herniation    3. Left lumbar radiculitis         Postoperative diagnosis:    1. Lumbar radiculopathy    2. Lumbar disc herniation    3. Left lumbar radiculitis         Procedure Performed:  Transforaminal lumbar epidural steroid injection at the levels of L4 - L5 on the Left side under fluoroscopic guidance without IV sedation. Indication for the Procedure: The patient failed conservative management  for pain in the low back radiating to lower extremities. Patient  is undergoing lumbar epidural steroid injections and the last procedure gave 50% relief.   As the patient is not responding to conservative management and pain is interfering with activities of daily living, we decided to proceed epidural space as well as along the nerve root was observed. Then after negative aspiration a total of 80 mg of triamcinolone and 2 ml of normal saline was injected through the needle. The needle is removed and a Band-Aid was placed over the needle  insertion site. The patient's vital signs remained stable and the patient tolerated the procedure well. The patient was discharged home in stable condition and will be followed in the pain clinic in the next few weeks for further planning.     Electronically signed by Iram Cerda MD on 2/28/2018 at 1:13 PM

## 2018-03-13 ENCOUNTER — INITIAL CONSULT (OUTPATIENT)
Dept: PHYSICAL MEDICINE AND REHAB | Age: 28
End: 2018-03-13
Payer: COMMERCIAL

## 2018-03-13 ENCOUNTER — HOSPITAL ENCOUNTER (OUTPATIENT)
Dept: GENERAL RADIOLOGY | Age: 28
Discharge: HOME | End: 2018-03-15
Payer: COMMERCIAL

## 2018-03-13 ENCOUNTER — HOSPITAL ENCOUNTER (OUTPATIENT)
Age: 28
Discharge: HOME | End: 2018-03-15
Payer: COMMERCIAL

## 2018-03-13 VITALS
TEMPERATURE: 98.3 F | SYSTOLIC BLOOD PRESSURE: 129 MMHG | HEART RATE: 88 BPM | BODY MASS INDEX: 35.24 KG/M2 | DIASTOLIC BLOOD PRESSURE: 83 MMHG | HEIGHT: 70 IN | WEIGHT: 246.2 LBS

## 2018-03-13 DIAGNOSIS — M43.16 SPONDYLOLISTHESIS AT L4-L5 LEVEL: ICD-10-CM

## 2018-03-13 DIAGNOSIS — M54.16 LUMBAR RADICULOPATHY: ICD-10-CM

## 2018-03-13 DIAGNOSIS — M70.61 TROCHANTERIC BURSITIS OF BOTH HIPS: ICD-10-CM

## 2018-03-13 DIAGNOSIS — M70.62 TROCHANTERIC BURSITIS OF BOTH HIPS: ICD-10-CM

## 2018-03-13 DIAGNOSIS — M51.26 LUMBAR DISC HERNIATION: Primary | Chronic | ICD-10-CM

## 2018-03-13 DIAGNOSIS — M51.26 LUMBAR DISC HERNIATION: Chronic | ICD-10-CM

## 2018-03-13 DIAGNOSIS — M47.816 LUMBAR SPONDYLOSIS: ICD-10-CM

## 2018-03-13 PROCEDURE — 72110 X-RAY EXAM L-2 SPINE 4/>VWS: CPT

## 2018-03-13 PROCEDURE — G8417 CALC BMI ABV UP PARAM F/U: HCPCS | Performed by: PHYSICAL MEDICINE & REHABILITATION

## 2018-03-13 PROCEDURE — 99244 OFF/OP CNSLTJ NEW/EST MOD 40: CPT | Performed by: PHYSICAL MEDICINE & REHABILITATION

## 2018-03-13 PROCEDURE — G8484 FLU IMMUNIZE NO ADMIN: HCPCS | Performed by: PHYSICAL MEDICINE & REHABILITATION

## 2018-03-13 PROCEDURE — G8427 DOCREV CUR MEDS BY ELIG CLIN: HCPCS | Performed by: PHYSICAL MEDICINE & REHABILITATION

## 2018-03-13 RX ORDER — PREDNISONE 10 MG/1
TABLET ORAL
Qty: 32 TABLET | Refills: 0 | Status: SHIPPED | OUTPATIENT
Start: 2018-03-13 | End: 2018-04-12 | Stop reason: ALTCHOICE

## 2018-03-13 ASSESSMENT — ENCOUNTER SYMPTOMS
BLOOD IN STOOL: 0
BACK PAIN: 1
ABDOMINAL PAIN: 0

## 2018-03-13 NOTE — LETTER
Order Specific Question:   Reason for exam:     Answer:   retrolisthesis L4-5 looking for instability, with back pain    Ambulatory referral to Physical Therapy     Referral Priority:   Routine     Referral Type:   Eval and Treat     Referral Reason:   Specialty Services Required     Requested Specialty:   Physical Therapy     Number of Visits Requested:   1     Orders Placed This Encounter   Medications    predniSONE (DELTASONE) 10 MG tablet     Sig: Day 1&2: Take 6 tablets PO QD, Day 3, 4, 5: Take 3 tablets PO QD, Day 6, 7, 8: Take 2 tablets PO QD, Day 9, 10, 11: Take 1 tablet POD QD, Day 12, 13, 14: Take 1/2 tablet PO QD. Dispense:  32 tablet     Refill:  0     Return in about 1 month (around 4/13/2018) for re-evaluate back pain. Thank you for allowing me to participate in the care of this patient. I will keep you updated on this patient's follow up and I look forward to serving you and your patients again in the future.         Johny Noble MD

## 2018-03-13 NOTE — PROGRESS NOTES
side.       Bicep reflexes are 2+ on the right side and 2+ on the left side. Brachioradialis reflexes are 2+ on the right side and 2+ on the left side. Patellar reflexes are 2+ on the right side and 2+ on the left side. Achilles reflexes are 2+ on the right side and 2+ on the left side. Nursing note and vitals reviewed. /83   Pulse 88   Temp 98.3 °F (36.8 °C) (Tympanic)   Ht 5' 10\" (1.778 m)   Wt 246 lb 3.2 oz (111.7 kg)   BMI 35.33 kg/m²   Constitutional: She is oriented to person, place, and time. She appears well-developed and well-nourished. HENT:   Head: Normocephalic. Eyes: EOM are normal.   Pulmonary/Chest: Effort normal.   Neurological: She is alert and oriented to person, place, and time. She has normal reflexes. Skin: Skin is warm and dry. Psychiatric: She has a normal mood and affect. Her behavior is normal. Thought content normal.   Nursing note and vitals reviewed. Imaging:      MRI Lumbar spine 11/17/17-      Assessment:      1. Lumbar disc herniation  Ambulatory referral to Physical Therapy    XR LUMBAR SPINE (MIN 4 VIEWS)   2. Lumbar radiculopathy  Ambulatory referral to Physical Therapy    predniSONE (DELTASONE) 10 MG tablet   3. Lumbar spondylosis  Ambulatory referral to Physical Therapy    XR LUMBAR SPINE (MIN 4 VIEWS)   4. Spondylolisthesis at L4-L5 level  Ambulatory referral to Physical Therapy    XR LUMBAR SPINE (MIN 4 VIEWS)   5. Trochanteric bursitis of both hips  Ambulatory referral to Physical Therapy    predniSONE (DELTASONE) 10 MG tablet          Plan:      1. Patient presents with radiating low back pain to the left more than the right calf, bilateral trochanteric bursitis, and generalized lower thoracic and lumbosacral pain on exam.  We'll order lumbar x-ray with for use looking for instability of the spine.   2.  Will order a short course of tapering steroids to try to decrease some of the patient's radicular pain and hopefully this will also

## 2018-03-19 DIAGNOSIS — M51.26 LUMBAR DISC HERNIATION: ICD-10-CM

## 2018-03-19 DIAGNOSIS — M54.16 LUMBAR RADICULOPATHY: ICD-10-CM

## 2018-03-22 ENCOUNTER — HOSPITAL ENCOUNTER (OUTPATIENT)
Dept: PHYSICAL THERAPY | Age: 28
Setting detail: THERAPIES SERIES
Discharge: HOME | End: 2018-03-22
Payer: COMMERCIAL

## 2018-03-22 PROCEDURE — 97110 THERAPEUTIC EXERCISES: CPT

## 2018-03-22 PROCEDURE — 97161 PT EVAL LOW COMPLEX 20 MIN: CPT

## 2018-03-22 ASSESSMENT — PAIN DESCRIPTION - LOCATION
LOCATION: BACK
LOCATION_2: NECK

## 2018-03-22 ASSESSMENT — PAIN DESCRIPTION - FREQUENCY: FREQUENCY: CONTINUOUS

## 2018-03-22 ASSESSMENT — PAIN DESCRIPTION - DESCRIPTORS
DESCRIPTORS: CONSTANT;RADIATING
DESCRIPTORS_2: CONSTANT;RADIATING

## 2018-03-22 ASSESSMENT — PAIN DESCRIPTION - ORIENTATION: ORIENTATION: LOWER

## 2018-03-22 ASSESSMENT — PAIN DESCRIPTION - DURATION: DURATION_2: CONTINUOUS

## 2018-03-22 ASSESSMENT — PAIN SCALES - GENERAL: PAINLEVEL_OUTOF10: 6

## 2018-03-22 ASSESSMENT — PAIN DESCRIPTION - INTENSITY: RATING_2: 4
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
## 2018-03-26 ENCOUNTER — HOSPITAL ENCOUNTER (EMERGENCY)
Age: 28
Discharge: HOME | End: 2018-03-26
Attending: EMERGENCY MEDICINE
Payer: COMMERCIAL

## 2018-03-26 VITALS
TEMPERATURE: 98.3 F | HEART RATE: 104 BPM | SYSTOLIC BLOOD PRESSURE: 148 MMHG | OXYGEN SATURATION: 98 % | DIASTOLIC BLOOD PRESSURE: 99 MMHG | HEIGHT: 70 IN | BODY MASS INDEX: 35.07 KG/M2 | WEIGHT: 245 LBS | RESPIRATION RATE: 18 BRPM

## 2018-03-26 DIAGNOSIS — G89.29 ACUTE EXACERBATION OF CHRONIC LOW BACK PAIN: Primary | ICD-10-CM

## 2018-03-26 DIAGNOSIS — M43.16 SPONDYLOLISTHESIS AT L4-L5 LEVEL: ICD-10-CM

## 2018-03-26 DIAGNOSIS — M54.50 ACUTE EXACERBATION OF CHRONIC LOW BACK PAIN: Primary | ICD-10-CM

## 2018-03-26 LAB
EKG ATRIAL RATE: 100 BPM
EKG P AXIS: 43 DEGREES
EKG P-R INTERVAL: 122 MS
EKG Q-T INTERVAL: 318 MS
EKG QRS DURATION: 80 MS
EKG QTC CALCULATION (BAZETT): 410 MS
EKG R AXIS: 56 DEGREES
EKG T AXIS: 30 DEGREES
EKG VENTRICULAR RATE: 100 BPM
TSH SERPL DL<=0.05 MIU/L-ACNC: 1.84 MIU/L (ref 0.3–5)

## 2018-03-26 PROCEDURE — 96374 THER/PROPH/DIAG INJ IV PUSH: CPT

## 2018-03-26 PROCEDURE — 93005 ELECTROCARDIOGRAM TRACING: CPT

## 2018-03-26 PROCEDURE — 84443 ASSAY THYROID STIM HORMONE: CPT

## 2018-03-26 PROCEDURE — 99284 EMERGENCY DEPT VISIT MOD MDM: CPT

## 2018-03-26 PROCEDURE — 6360000002 HC RX W HCPCS: Performed by: STUDENT IN AN ORGANIZED HEALTH CARE EDUCATION/TRAINING PROGRAM

## 2018-03-26 RX ORDER — KETOROLAC TROMETHAMINE 30 MG/ML
30 INJECTION, SOLUTION INTRAMUSCULAR; INTRAVENOUS ONCE
Status: CANCELLED | OUTPATIENT
Start: 2018-03-26 | End: 2018-03-26

## 2018-03-26 RX ORDER — KETOROLAC TROMETHAMINE 30 MG/ML
30 INJECTION, SOLUTION INTRAMUSCULAR; INTRAVENOUS ONCE
Status: DISCONTINUED | OUTPATIENT
Start: 2018-03-26 | End: 2018-03-26

## 2018-03-26 RX ORDER — KETOROLAC TROMETHAMINE 30 MG/ML
30 INJECTION, SOLUTION INTRAMUSCULAR; INTRAVENOUS ONCE
Status: COMPLETED | OUTPATIENT
Start: 2018-03-26 | End: 2018-03-26

## 2018-03-26 RX ADMIN — KETOROLAC TROMETHAMINE 30 MG: 30 INJECTION, SOLUTION INTRAMUSCULAR; INTRAVENOUS at 15:10

## 2018-03-26 ASSESSMENT — ENCOUNTER SYMPTOMS
BOWEL INCONTINENCE: 0
SHORTNESS OF BREATH: 0
RHINORRHEA: 0
COLOR CHANGE: 0
DIARRHEA: 0
VOMITING: 0
ABDOMINAL PAIN: 0
BACK PAIN: 1
COUGH: 0

## 2018-03-26 ASSESSMENT — PAIN SCALES - GENERAL
PAINLEVEL_OUTOF10: 6
PAINLEVEL_OUTOF10: 6

## 2018-03-26 ASSESSMENT — PAIN DESCRIPTION - PAIN TYPE: TYPE: CHRONIC PAIN

## 2018-03-26 ASSESSMENT — PAIN DESCRIPTION - ONSET: ONSET: ON-GOING

## 2018-03-26 ASSESSMENT — PAIN DESCRIPTION - DESCRIPTORS: DESCRIPTORS: RADIATING;SHARP;CONSTANT

## 2018-03-26 ASSESSMENT — PAIN DESCRIPTION - FREQUENCY: FREQUENCY: CONTINUOUS

## 2018-03-26 ASSESSMENT — PAIN DESCRIPTION - LOCATION: LOCATION: BACK

## 2018-03-26 ASSESSMENT — PAIN DESCRIPTION - ORIENTATION: ORIENTATION: LOWER

## 2018-03-29 ENCOUNTER — OFFICE VISIT (OUTPATIENT)
Dept: FAMILY MEDICINE CLINIC | Age: 28
End: 2018-03-29
Payer: COMMERCIAL

## 2018-03-29 ENCOUNTER — HOSPITAL ENCOUNTER (OUTPATIENT)
Dept: PHYSICAL THERAPY | Age: 28
Setting detail: THERAPIES SERIES
Discharge: HOME | End: 2018-03-29
Payer: COMMERCIAL

## 2018-03-29 VITALS
WEIGHT: 238.8 LBS | SYSTOLIC BLOOD PRESSURE: 151 MMHG | DIASTOLIC BLOOD PRESSURE: 102 MMHG | BODY MASS INDEX: 34.19 KG/M2 | HEIGHT: 70 IN | HEART RATE: 104 BPM

## 2018-03-29 DIAGNOSIS — R03.0 ELEVATED BP WITHOUT DIAGNOSIS OF HYPERTENSION: ICD-10-CM

## 2018-03-29 DIAGNOSIS — M51.26 LUMBAR DISC HERNIATION: Primary | ICD-10-CM

## 2018-03-29 DIAGNOSIS — E03.8 SUBCLINICAL HYPOTHYROIDISM: ICD-10-CM

## 2018-03-29 DIAGNOSIS — M43.16 SPONDYLOLISTHESIS AT L4-L5 LEVEL: ICD-10-CM

## 2018-03-29 DIAGNOSIS — M54.16 LUMBAR RADICULOPATHY: ICD-10-CM

## 2018-03-29 PROCEDURE — G8417 CALC BMI ABV UP PARAM F/U: HCPCS | Performed by: FAMILY MEDICINE

## 2018-03-29 PROCEDURE — 1036F TOBACCO NON-USER: CPT | Performed by: FAMILY MEDICINE

## 2018-03-29 PROCEDURE — 99214 OFFICE O/P EST MOD 30 MIN: CPT | Performed by: FAMILY MEDICINE

## 2018-03-29 PROCEDURE — 97113 AQUATIC THERAPY/EXERCISES: CPT

## 2018-03-29 PROCEDURE — G8427 DOCREV CUR MEDS BY ELIG CLIN: HCPCS | Performed by: FAMILY MEDICINE

## 2018-03-29 PROCEDURE — G8484 FLU IMMUNIZE NO ADMIN: HCPCS | Performed by: FAMILY MEDICINE

## 2018-03-29 RX ORDER — ACETAMINOPHEN AND CODEINE PHOSPHATE 300; 30 MG/1; MG/1
1 TABLET ORAL 3 TIMES DAILY PRN
Qty: 30 TABLET | Refills: 0 | Status: SHIPPED | OUTPATIENT
Start: 2018-03-29 | End: 2018-10-31 | Stop reason: SDUPTHER

## 2018-03-29 RX ORDER — GABAPENTIN 300 MG/1
600 CAPSULE ORAL 3 TIMES DAILY
Qty: 180 CAPSULE | Refills: 0 | Status: SHIPPED | OUTPATIENT
Start: 2018-03-29 | End: 2019-02-28

## 2018-03-29 ASSESSMENT — ENCOUNTER SYMPTOMS
COUGH: 0
RECTAL PAIN: 0
DIARRHEA: 0
SINUS PRESSURE: 0
SINUS PAIN: 0
EYE PAIN: 0
ABDOMINAL PAIN: 0
ANAL BLEEDING: 0
CONSTIPATION: 0
PHOTOPHOBIA: 0
WHEEZING: 0
BLOOD IN STOOL: 0
SHORTNESS OF BREATH: 0
RHINORRHEA: 0
BACK PAIN: 1
NAUSEA: 0

## 2018-03-29 ASSESSMENT — PAIN DESCRIPTION - ORIENTATION: ORIENTATION: LOWER

## 2018-03-29 ASSESSMENT — PAIN DESCRIPTION - LOCATION: LOCATION: BACK

## 2018-03-29 ASSESSMENT — PAIN DESCRIPTION - PAIN TYPE: TYPE: CHRONIC PAIN

## 2018-03-29 ASSESSMENT — PAIN SCALES - GENERAL: PAINLEVEL_OUTOF10: 7

## 2018-03-29 NOTE — PROGRESS NOTES
(TYLENOL/CODEINE #3) 300-30 MG per tablet Take 1 tablet by mouth 3 times daily as needed for Pain for up to 30 doses. 30 tablet 0    gabapentin (NEURONTIN) 300 MG capsule Take 2 capsules by mouth 3 times daily for 30 days. 180 capsule 0    Misc. Devices (WALKER) MISC 1 each by Does not apply route daily 1 each 0    diclofenac sodium 1 % GEL Apply 2 g topically 2 times daily 1 Tube 3    predniSONE (DELTASONE) 10 MG tablet Day 1&2: Take 6 tablets PO QD, Day 3, 4, 5: Take 3 tablets PO QD, Day 6, 7, 8: Take 2 tablets PO QD, Day 9, 10, 11: Take 1 tablet POD QD, Day 12, 13, 14: Take 1/2 tablet PO QD. 32 tablet 0    tiZANidine (ZANAFLEX) 4 MG tablet take 1 tablet by mouth three times a day  0    Calcium Carbonate-Vitamin D (OYSTER SHELL CALCIUM/D) 500-200 MG-UNIT TABS Take 1 tablet by mouth daily 30 tablet 3    thyroid (ARMOUR) 120 MG tablet Take 1 tablet by mouth daily 30 tablet 3    naproxen (NAPROSYN) 500 MG tablet Take 1 tablet by mouth 2 times daily (with meals) 60 tablet 3    loratadine (CLARITIN) 10 MG tablet Take 1 tablet by mouth daily 30 tablet 2    fluticasone (FLONASE) 50 MCG/ACT nasal spray 1 spray by Nasal route daily 1 Bottle 3    albuterol sulfate  (90 BASE) MCG/ACT inhaler Inhale 2 puffs into the lungs every 6 hours as needed for Wheezing or Shortness of Breath 1 Inhaler 1    Respiratory Therapy Supplies (BREATHERITE VALVED MDI CHAMBER) RICHARD To be used with inhaler 1 Device 1     No current facility-administered medications for this visit. Social History     Social History    Marital status:      Spouse name: N/A    Number of children: N/A    Years of education: N/A     Occupational History    Not on file.      Social History Main Topics    Smoking status: Never Smoker    Smokeless tobacco: Never Used    Alcohol use No    Drug use: No    Sexual activity: Not Currently     Partners: Male     Other Topics Concern    Not on file     Social History Narrative    Pupils equal, round and reactive to light and accommodation, extraocular   movements intact. ENT: Moist mucous membranes. No erythema is noted. NECK: Supple. No masses. No lymphadenopathy. CARDIOVASCULAR: Regular rate and rhythm. PULMONARY: Lungs are clear to auscultation bilaterally. ABDOMEN: Soft, nontender, nondistended. Positive bowel sounds. MUSCULOSKELETAL: Patient is standing not able to sit, shivering, uncomfortable. NEUROLOGIC: Cranial nerves II through XII grossly intact. No focal deficits are noted. ASSESSMENT AND PLAN      1. Lumbar disc herniation  -Discussed with patient that he needed discussed with the neurosurgeon to go for surgery, patient will call and schedule appointment. Due to patient's pain and inability to ADLs at home with try low-dose Tylenol 3, discussed with patient to take only as needed. Increase her Neurontin to 600 mg.  - acetaminophen-codeine (TYLENOL/CODEINE #3) 300-30 MG per tablet; Take 1 tablet by mouth 3 times daily as needed for Pain for up to 30 doses. Dispense: 30 tablet; Refill: 0  - gabapentin (NEURONTIN) 300 MG capsule; Take 2 capsules by mouth 3 times daily for 30 days. Dispense: 180 capsule; Refill: 0  - Misc. Devices (WALKER) MISC; 1 each by Does not apply route daily  Dispense: 1 each; Refill: 0    2. Lumbar radiculopathy  -Increase Neurontin to 6 mg continue aquatic therapy and low-dose narcotic  - acetaminophen-codeine (TYLENOL/CODEINE #3) 300-30 MG per tablet; Take 1 tablet by mouth 3 times daily as needed for Pain for up to 30 doses. Dispense: 30 tablet; Refill: 0  - gabapentin (NEURONTIN) 300 MG capsule; Take 2 capsules by mouth 3 times daily for 30 days. Dispense: 180 capsule; Refill: 0  - Misc. Devices (WALKER) MISC; 1 each by Does not apply route daily  Dispense: 1 each; Refill: 0    3. Spondylolisthesis at L4-L5 level  -Discussed with patient that he'll need a walker until he'll get more stable to avoid any fractures. - Misc.  Devices

## 2018-03-30 ENCOUNTER — TELEPHONE (OUTPATIENT)
Dept: FAMILY MEDICINE CLINIC | Age: 28
End: 2018-03-30

## 2018-03-30 DIAGNOSIS — M54.16 LUMBAR RADICULOPATHY: Primary | ICD-10-CM

## 2018-03-30 DIAGNOSIS — M47.816 LUMBAR SPONDYLOSIS: ICD-10-CM

## 2018-03-30 DIAGNOSIS — M43.16 SPONDYLOLISTHESIS AT L4-L5 LEVEL: ICD-10-CM

## 2018-04-02 ENCOUNTER — HOSPITAL ENCOUNTER (OUTPATIENT)
Dept: PHYSICAL THERAPY | Age: 28
Setting detail: THERAPIES SERIES
Discharge: HOME | End: 2018-04-02
Payer: COMMERCIAL

## 2018-04-02 ENCOUNTER — TELEPHONE (OUTPATIENT)
Dept: FAMILY MEDICINE CLINIC | Age: 28
End: 2018-04-02

## 2018-04-02 PROCEDURE — 97113 AQUATIC THERAPY/EXERCISES: CPT

## 2018-04-02 ASSESSMENT — PAIN DESCRIPTION - ORIENTATION: ORIENTATION: LOWER

## 2018-04-02 ASSESSMENT — PAIN DESCRIPTION - LOCATION: LOCATION: BACK

## 2018-04-02 ASSESSMENT — PAIN DESCRIPTION - PAIN TYPE: TYPE: CHRONIC PAIN

## 2018-04-05 ENCOUNTER — HOSPITAL ENCOUNTER (OUTPATIENT)
Dept: PHYSICAL THERAPY | Age: 28
Setting detail: THERAPIES SERIES
Discharge: HOME | End: 2018-04-05
Payer: COMMERCIAL

## 2018-04-05 PROCEDURE — 97113 AQUATIC THERAPY/EXERCISES: CPT

## 2018-04-05 ASSESSMENT — PAIN DESCRIPTION - PAIN TYPE: TYPE: CHRONIC PAIN

## 2018-04-05 ASSESSMENT — PAIN DESCRIPTION - ORIENTATION: ORIENTATION: LOWER

## 2018-04-05 ASSESSMENT — PAIN SCALES - GENERAL: PAINLEVEL_OUTOF10: 7

## 2018-04-05 ASSESSMENT — PAIN DESCRIPTION - LOCATION: LOCATION: BACK

## 2018-04-09 ENCOUNTER — HOSPITAL ENCOUNTER (OUTPATIENT)
Dept: PHYSICAL THERAPY | Age: 28
Setting detail: THERAPIES SERIES
Discharge: HOME | End: 2018-04-09
Payer: COMMERCIAL

## 2018-04-09 PROCEDURE — 97113 AQUATIC THERAPY/EXERCISES: CPT

## 2018-04-09 ASSESSMENT — PAIN DESCRIPTION - LOCATION: LOCATION: BACK

## 2018-04-09 ASSESSMENT — PAIN SCALES - GENERAL: PAINLEVEL_OUTOF10: 6

## 2018-04-09 ASSESSMENT — PAIN DESCRIPTION - PAIN TYPE: TYPE: CHRONIC PAIN

## 2018-04-09 ASSESSMENT — PAIN DESCRIPTION - ORIENTATION: ORIENTATION: LOWER

## 2018-04-12 ENCOUNTER — OFFICE VISIT (OUTPATIENT)
Dept: PHYSICAL MEDICINE AND REHAB | Age: 28
End: 2018-04-12
Payer: COMMERCIAL

## 2018-04-12 ENCOUNTER — HOSPITAL ENCOUNTER (OUTPATIENT)
Dept: PHYSICAL THERAPY | Age: 28
Setting detail: THERAPIES SERIES
Discharge: HOME | End: 2018-04-12
Payer: COMMERCIAL

## 2018-04-12 VITALS
HEART RATE: 110 BPM | SYSTOLIC BLOOD PRESSURE: 127 MMHG | TEMPERATURE: 99.4 F | BODY MASS INDEX: 34.92 KG/M2 | DIASTOLIC BLOOD PRESSURE: 76 MMHG | WEIGHT: 243.4 LBS

## 2018-04-12 DIAGNOSIS — M51.26 LUMBAR DISC HERNIATION: Chronic | ICD-10-CM

## 2018-04-12 DIAGNOSIS — M54.16 LUMBAR RADICULOPATHY: Primary | ICD-10-CM

## 2018-04-12 DIAGNOSIS — M47.816 LUMBAR SPONDYLOSIS: ICD-10-CM

## 2018-04-12 PROCEDURE — 99214 OFFICE O/P EST MOD 30 MIN: CPT | Performed by: PHYSICAL MEDICINE & REHABILITATION

## 2018-04-12 PROCEDURE — 1036F TOBACCO NON-USER: CPT | Performed by: PHYSICAL MEDICINE & REHABILITATION

## 2018-04-12 PROCEDURE — 97113 AQUATIC THERAPY/EXERCISES: CPT

## 2018-04-12 PROCEDURE — G8427 DOCREV CUR MEDS BY ELIG CLIN: HCPCS | Performed by: PHYSICAL MEDICINE & REHABILITATION

## 2018-04-12 PROCEDURE — G8417 CALC BMI ABV UP PARAM F/U: HCPCS | Performed by: PHYSICAL MEDICINE & REHABILITATION

## 2018-04-12 ASSESSMENT — PAIN SCALES - GENERAL: PAINLEVEL_OUTOF10: 7

## 2018-04-12 ASSESSMENT — PAIN DESCRIPTION - LOCATION: LOCATION: BACK

## 2018-04-12 ASSESSMENT — PAIN DESCRIPTION - PAIN TYPE: TYPE: CHRONIC PAIN

## 2018-04-12 ASSESSMENT — PAIN DESCRIPTION - ORIENTATION: ORIENTATION: LOWER

## 2018-04-16 ENCOUNTER — CLINICAL DOCUMENTATION (OUTPATIENT)
Dept: PHYSICAL MEDICINE AND REHAB | Age: 28
End: 2018-04-16

## 2018-04-16 ENCOUNTER — APPOINTMENT (OUTPATIENT)
Dept: PHYSICAL THERAPY | Age: 28
End: 2018-04-16
Payer: COMMERCIAL

## 2018-04-16 ENCOUNTER — HOSPITAL ENCOUNTER (OUTPATIENT)
Dept: NEUROLOGY | Age: 28
Discharge: HOME | End: 2018-04-16
Payer: COMMERCIAL

## 2018-04-16 PROCEDURE — 95909 NRV CNDJ TST 5-6 STUDIES: CPT | Performed by: PHYSICAL MEDICINE & REHABILITATION

## 2018-04-16 PROCEDURE — 95886 MUSC TEST DONE W/N TEST COMP: CPT | Performed by: PHYSICAL MEDICINE & REHABILITATION

## 2018-04-19 ENCOUNTER — APPOINTMENT (OUTPATIENT)
Dept: PHYSICAL THERAPY | Age: 28
End: 2018-04-19
Payer: COMMERCIAL

## 2018-04-19 ENCOUNTER — HOSPITAL ENCOUNTER (OUTPATIENT)
Dept: PHYSICAL THERAPY | Age: 28
Setting detail: THERAPIES SERIES
Discharge: HOME | End: 2018-04-19
Payer: COMMERCIAL

## 2018-04-19 PROCEDURE — 97110 THERAPEUTIC EXERCISES: CPT

## 2018-04-19 ASSESSMENT — PAIN DESCRIPTION - LOCATION
LOCATION_2: NECK
LOCATION: BACK

## 2018-04-19 ASSESSMENT — PAIN DESCRIPTION - FREQUENCY: FREQUENCY: CONTINUOUS

## 2018-04-19 ASSESSMENT — PAIN DESCRIPTION - DESCRIPTORS
DESCRIPTORS: SHOOTING;SHARP;CONSTANT
DESCRIPTORS_2: CONSTANT;RADIATING

## 2018-04-19 ASSESSMENT — PAIN DESCRIPTION - ORIENTATION: ORIENTATION: LOWER

## 2018-04-19 ASSESSMENT — PAIN DESCRIPTION - DURATION: DURATION_2: CONTINUOUS

## 2018-04-19 ASSESSMENT — PAIN SCALES - GENERAL: PAINLEVEL_OUTOF10: 7

## 2018-04-19 ASSESSMENT — PAIN DESCRIPTION - INTENSITY: RATING_2: 4

## 2018-04-23 ENCOUNTER — TELEPHONE (OUTPATIENT)
Dept: FAMILY MEDICINE CLINIC | Age: 28
End: 2018-04-23

## 2018-04-23 ENCOUNTER — APPOINTMENT (OUTPATIENT)
Dept: PHYSICAL THERAPY | Age: 28
End: 2018-04-23
Payer: COMMERCIAL

## 2018-04-25 ENCOUNTER — OFFICE VISIT (OUTPATIENT)
Dept: NEUROSURGERY | Age: 28
End: 2018-04-25
Payer: COMMERCIAL

## 2018-04-25 VITALS
HEART RATE: 94 BPM | BODY MASS INDEX: 35.15 KG/M2 | SYSTOLIC BLOOD PRESSURE: 126 MMHG | WEIGHT: 245 LBS | DIASTOLIC BLOOD PRESSURE: 76 MMHG

## 2018-04-25 DIAGNOSIS — M54.16 LUMBAR RADICULOPATHY: ICD-10-CM

## 2018-04-25 DIAGNOSIS — M53.3 COCCYX PAIN: Primary | ICD-10-CM

## 2018-04-25 PROCEDURE — 99214 OFFICE O/P EST MOD 30 MIN: CPT | Performed by: NEUROLOGICAL SURGERY

## 2018-04-25 PROCEDURE — G8417 CALC BMI ABV UP PARAM F/U: HCPCS | Performed by: NEUROLOGICAL SURGERY

## 2018-04-25 PROCEDURE — G8427 DOCREV CUR MEDS BY ELIG CLIN: HCPCS | Performed by: NEUROLOGICAL SURGERY

## 2018-04-25 PROCEDURE — 1036F TOBACCO NON-USER: CPT | Performed by: NEUROLOGICAL SURGERY

## 2018-04-26 ENCOUNTER — APPOINTMENT (OUTPATIENT)
Dept: PHYSICAL THERAPY | Age: 28
End: 2018-04-26
Payer: COMMERCIAL

## 2018-05-03 ENCOUNTER — OFFICE VISIT (OUTPATIENT)
Dept: UROLOGY | Age: 28
End: 2018-05-03
Payer: COMMERCIAL

## 2018-05-03 ENCOUNTER — HOSPITAL ENCOUNTER (OUTPATIENT)
Dept: CT IMAGING | Age: 28
Discharge: HOME | End: 2018-05-05
Payer: COMMERCIAL

## 2018-05-03 ENCOUNTER — HOSPITAL ENCOUNTER (OUTPATIENT)
Dept: PAIN MANAGEMENT | Age: 28
Discharge: HOME | End: 2018-05-03
Payer: COMMERCIAL

## 2018-05-03 VITALS
DIASTOLIC BLOOD PRESSURE: 90 MMHG | TEMPERATURE: 98 F | RESPIRATION RATE: 18 BRPM | HEART RATE: 98 BPM | SYSTOLIC BLOOD PRESSURE: 158 MMHG

## 2018-05-03 VITALS
SYSTOLIC BLOOD PRESSURE: 115 MMHG | HEIGHT: 70 IN | BODY MASS INDEX: 35.07 KG/M2 | WEIGHT: 245 LBS | HEART RATE: 103 BPM | DIASTOLIC BLOOD PRESSURE: 71 MMHG | TEMPERATURE: 98 F | RESPIRATION RATE: 16 BRPM

## 2018-05-03 DIAGNOSIS — M53.3 COCCYX PAIN: ICD-10-CM

## 2018-05-03 DIAGNOSIS — N39.41 URGE INCONTINENCE OF URINE: Primary | ICD-10-CM

## 2018-05-03 PROCEDURE — G8417 CALC BMI ABV UP PARAM F/U: HCPCS | Performed by: UROLOGY

## 2018-05-03 PROCEDURE — 99214 OFFICE O/P EST MOD 30 MIN: CPT | Performed by: PAIN MEDICINE

## 2018-05-03 PROCEDURE — 99204 OFFICE O/P NEW MOD 45 MIN: CPT | Performed by: UROLOGY

## 2018-05-03 PROCEDURE — 1036F TOBACCO NON-USER: CPT | Performed by: UROLOGY

## 2018-05-03 PROCEDURE — 72192 CT PELVIS W/O DYE: CPT

## 2018-05-03 PROCEDURE — G8427 DOCREV CUR MEDS BY ELIG CLIN: HCPCS | Performed by: UROLOGY

## 2018-05-03 PROCEDURE — 99203 OFFICE O/P NEW LOW 30 MIN: CPT

## 2018-05-03 ASSESSMENT — PAIN SCALES - GENERAL: PAINLEVEL_OUTOF10: 6

## 2018-05-03 ASSESSMENT — ENCOUNTER SYMPTOMS
RESPIRATORY NEGATIVE: 1
ABDOMINAL PAIN: 0
COLOR CHANGE: 0
BOWEL INCONTINENCE: 1
EYE REDNESS: 0
BACK PAIN: 1
EYES NEGATIVE: 1
NAUSEA: 0
BACK PAIN: 1
EYE PAIN: 0
SHORTNESS OF BREATH: 0
VOMITING: 0
WHEEZING: 0
COUGH: 0

## 2018-05-04 ENCOUNTER — TELEPHONE (OUTPATIENT)
Dept: FAMILY MEDICINE CLINIC | Age: 28
End: 2018-05-04

## 2018-05-04 ENCOUNTER — TELEPHONE (OUTPATIENT)
Dept: NEUROSURGERY | Age: 28
End: 2018-05-04

## 2018-05-04 DIAGNOSIS — N83.209 CYST OF OVARY, UNSPECIFIED LATERALITY: Primary | ICD-10-CM

## 2018-05-10 ENCOUNTER — HOSPITAL ENCOUNTER (OUTPATIENT)
Dept: ULTRASOUND IMAGING | Age: 28
Discharge: HOME | End: 2018-05-12
Payer: COMMERCIAL

## 2018-05-10 ENCOUNTER — HOSPITAL ENCOUNTER (OUTPATIENT)
Dept: PAIN MANAGEMENT | Age: 28
Discharge: HOME | End: 2018-05-10
Payer: COMMERCIAL

## 2018-05-10 VITALS
DIASTOLIC BLOOD PRESSURE: 85 MMHG | SYSTOLIC BLOOD PRESSURE: 142 MMHG | WEIGHT: 245 LBS | HEART RATE: 90 BPM | RESPIRATION RATE: 16 BRPM | HEIGHT: 70 IN | BODY MASS INDEX: 35.07 KG/M2 | TEMPERATURE: 98 F

## 2018-05-10 DIAGNOSIS — N83.209 CYST OF OVARY, UNSPECIFIED LATERALITY: ICD-10-CM

## 2018-05-10 PROCEDURE — 99214 OFFICE O/P EST MOD 30 MIN: CPT

## 2018-05-10 PROCEDURE — 99214 OFFICE O/P EST MOD 30 MIN: CPT | Performed by: PAIN MEDICINE

## 2018-05-10 PROCEDURE — 76830 TRANSVAGINAL US NON-OB: CPT

## 2018-05-10 ASSESSMENT — ENCOUNTER SYMPTOMS
BACK PAIN: 1
RESPIRATORY NEGATIVE: 1

## 2018-05-10 ASSESSMENT — PAIN DESCRIPTION - LOCATION: LOCATION: BACK

## 2018-05-10 ASSESSMENT — PAIN SCALES - GENERAL: PAINLEVEL_OUTOF10: 6

## 2018-05-10 ASSESSMENT — PAIN DESCRIPTION - PAIN TYPE: TYPE: CHRONIC PAIN

## 2018-05-24 ENCOUNTER — OFFICE VISIT (OUTPATIENT)
Dept: FAMILY MEDICINE CLINIC | Age: 28
End: 2018-05-24
Payer: COMMERCIAL

## 2018-05-24 VITALS
HEART RATE: 97 BPM | DIASTOLIC BLOOD PRESSURE: 82 MMHG | HEIGHT: 70 IN | BODY MASS INDEX: 35.39 KG/M2 | WEIGHT: 247.2 LBS | SYSTOLIC BLOOD PRESSURE: 127 MMHG | OXYGEN SATURATION: 99 % | TEMPERATURE: 98.4 F

## 2018-05-24 DIAGNOSIS — J30.9 ALLERGIC SINUSITIS: ICD-10-CM

## 2018-05-24 DIAGNOSIS — J30.1 SEASONAL ALLERGIC RHINITIS DUE TO POLLEN: ICD-10-CM

## 2018-05-24 DIAGNOSIS — E03.8 SUBCLINICAL HYPOTHYROIDISM: ICD-10-CM

## 2018-05-24 DIAGNOSIS — M51.26 LUMBAR DISC HERNIATION: Primary | ICD-10-CM

## 2018-05-24 DIAGNOSIS — H10.13 ALLERGIC CONJUNCTIVITIS OF BOTH EYES: ICD-10-CM

## 2018-05-24 DIAGNOSIS — N83.201 RIGHT OVARIAN CYST: ICD-10-CM

## 2018-05-24 DIAGNOSIS — M51.26 LUMBAR DISC HERNIATION: ICD-10-CM

## 2018-05-24 DIAGNOSIS — M54.16 LUMBAR RADICULOPATHY: ICD-10-CM

## 2018-05-24 PROCEDURE — G8427 DOCREV CUR MEDS BY ELIG CLIN: HCPCS | Performed by: FAMILY MEDICINE

## 2018-05-24 PROCEDURE — G8417 CALC BMI ABV UP PARAM F/U: HCPCS | Performed by: FAMILY MEDICINE

## 2018-05-24 PROCEDURE — 1036F TOBACCO NON-USER: CPT | Performed by: FAMILY MEDICINE

## 2018-05-24 PROCEDURE — 99214 OFFICE O/P EST MOD 30 MIN: CPT | Performed by: FAMILY MEDICINE

## 2018-05-24 RX ORDER — NAPROXEN 500 MG/1
500 TABLET ORAL 2 TIMES DAILY WITH MEALS
Qty: 120 TABLET | Refills: 3 | Status: SHIPPED | OUTPATIENT
Start: 2018-05-24 | End: 2019-02-28 | Stop reason: SDUPTHER

## 2018-05-24 RX ORDER — AZELASTINE HYDROCHLORIDE 0.5 MG/ML
1 SOLUTION/ DROPS OPHTHALMIC 2 TIMES DAILY
Qty: 1 BOTTLE | Refills: 0 | Status: SHIPPED | OUTPATIENT
Start: 2018-05-24 | End: 2018-06-23

## 2018-05-24 RX ORDER — NAPROXEN 500 MG/1
500 TABLET ORAL 2 TIMES DAILY WITH MEALS
Qty: 60 TABLET | Refills: 3 | OUTPATIENT
Start: 2018-05-24

## 2018-05-24 RX ORDER — LEVOTHYROXINE AND LIOTHYRONINE 76; 18 UG/1; UG/1
120 TABLET ORAL DAILY
Qty: 30 TABLET | Refills: 3 | Status: SHIPPED | OUTPATIENT
Start: 2018-05-24 | End: 2019-02-28 | Stop reason: SDUPTHER

## 2018-05-24 RX ORDER — LORATADINE 10 MG/1
10 TABLET ORAL DAILY
Qty: 30 TABLET | Refills: 2 | Status: SHIPPED | OUTPATIENT
Start: 2018-05-24 | End: 2019-02-28 | Stop reason: SDUPTHER

## 2018-05-24 RX ORDER — LEVOTHYROXINE AND LIOTHYRONINE 76; 18 UG/1; UG/1
120 TABLET ORAL DAILY
Qty: 30 TABLET | Refills: 3 | OUTPATIENT
Start: 2018-05-24

## 2018-05-24 RX ORDER — LORATADINE 10 MG/1
10 TABLET ORAL DAILY
Qty: 30 TABLET | Refills: 2 | OUTPATIENT
Start: 2018-05-24

## 2018-05-24 ASSESSMENT — ENCOUNTER SYMPTOMS
BLOOD IN STOOL: 0
CONSTIPATION: 0
WHEEZING: 0
PHOTOPHOBIA: 0
RHINORRHEA: 0
ABDOMINAL PAIN: 0
SINUS PRESSURE: 0
EYE REDNESS: 1
SHORTNESS OF BREATH: 0
EYE ITCHING: 1
BACK PAIN: 1

## 2018-06-01 ENCOUNTER — HOSPITAL ENCOUNTER (OUTPATIENT)
Age: 28
Discharge: HOME | End: 2018-06-01
Payer: COMMERCIAL

## 2018-06-01 ENCOUNTER — HOSPITAL ENCOUNTER (OUTPATIENT)
Dept: PAIN MANAGEMENT | Age: 28
Discharge: HOME | End: 2018-06-01
Payer: COMMERCIAL

## 2018-06-01 VITALS
TEMPERATURE: 98 F | HEART RATE: 98 BPM | OXYGEN SATURATION: 99 % | SYSTOLIC BLOOD PRESSURE: 142 MMHG | RESPIRATION RATE: 16 BRPM | DIASTOLIC BLOOD PRESSURE: 91 MMHG

## 2018-06-01 LAB
ABSOLUTE EOS #: 0.24 K/UL (ref 0–0.44)
ABSOLUTE IMMATURE GRANULOCYTE: 0.03 K/UL (ref 0–0.3)
ABSOLUTE LYMPH #: 1.52 K/UL (ref 1.1–3.7)
ABSOLUTE MONO #: 0.64 K/UL (ref 0.1–1.2)
BASOPHILS # BLD: 0 % (ref 0–2)
BASOPHILS ABSOLUTE: <0.03 K/UL (ref 0–0.2)
C-REACTIVE PROTEIN: 10.5 MG/L (ref 0–5)
CCP IGG ANTIBODIES: <1.5 U/ML
DIFFERENTIAL TYPE: ABNORMAL
EOSINOPHILS RELATIVE PERCENT: 3 % (ref 1–4)
HCG, PREGNANCY URINE (POC): NEGATIVE
HCT VFR BLD CALC: 41.2 % (ref 36.3–47.1)
HEMOGLOBIN: 13.9 G/DL (ref 11.9–15.1)
IMMATURE GRANULOCYTES: 0 %
LYMPHOCYTES # BLD: 18 % (ref 24–43)
MCH RBC QN AUTO: 29.7 PG (ref 25.2–33.5)
MCHC RBC AUTO-ENTMCNC: 33.7 G/DL (ref 28.4–34.8)
MCV RBC AUTO: 88 FL (ref 82.6–102.9)
MONOCYTES # BLD: 8 % (ref 3–12)
NRBC AUTOMATED: 0 PER 100 WBC
PDW BLD-RTO: 12 % (ref 11.8–14.4)
PLATELET # BLD: 226 K/UL (ref 138–453)
PLATELET ESTIMATE: ABNORMAL
PMV BLD AUTO: 9.5 FL (ref 8.1–13.5)
RBC # BLD: 4.68 M/UL (ref 3.95–5.11)
RBC # BLD: ABNORMAL 10*6/UL
RHEUMATOID FACTOR: <10 IU/ML
SEDIMENTATION RATE, ERYTHROCYTE: 17 MM (ref 0–20)
SEG NEUTROPHILS: 71 % (ref 36–65)
SEGMENTED NEUTROPHILS ABSOLUTE COUNT: 5.97 K/UL (ref 1.5–8.1)
WBC # BLD: 8.4 K/UL (ref 3.5–11.3)
WBC # BLD: ABNORMAL 10*3/UL

## 2018-06-01 PROCEDURE — 86235 NUCLEAR ANTIGEN ANTIBODY: CPT

## 2018-06-01 PROCEDURE — 84703 CHORIONIC GONADOTROPIN ASSAY: CPT

## 2018-06-01 PROCEDURE — 2500000003 HC RX 250 WO HCPCS

## 2018-06-01 PROCEDURE — 86225 DNA ANTIBODY NATIVE: CPT

## 2018-06-01 PROCEDURE — 85025 COMPLETE CBC W/AUTO DIFF WBC: CPT

## 2018-06-01 PROCEDURE — 86038 ANTINUCLEAR ANTIBODIES: CPT

## 2018-06-01 PROCEDURE — 64494 INJ PARAVERT F JNT L/S 2 LEV: CPT

## 2018-06-01 PROCEDURE — 86140 C-REACTIVE PROTEIN: CPT

## 2018-06-01 PROCEDURE — 85651 RBC SED RATE NONAUTOMATED: CPT

## 2018-06-01 PROCEDURE — 64493 INJ PARAVERT F JNT L/S 1 LEV: CPT

## 2018-06-01 PROCEDURE — 86431 RHEUMATOID FACTOR QUANT: CPT

## 2018-06-01 PROCEDURE — 36415 COLL VENOUS BLD VENIPUNCTURE: CPT

## 2018-06-01 PROCEDURE — 64493 INJ PARAVERT F JNT L/S 1 LEV: CPT | Performed by: PAIN MEDICINE

## 2018-06-01 PROCEDURE — 86200 CCP ANTIBODY: CPT

## 2018-06-01 PROCEDURE — 86812 HLA TYPING A B OR C: CPT

## 2018-06-01 PROCEDURE — 64494 INJ PARAVERT F JNT L/S 2 LEV: CPT | Performed by: PAIN MEDICINE

## 2018-06-01 RX ORDER — SODIUM CHLORIDE, SODIUM LACTATE, POTASSIUM CHLORIDE, CALCIUM CHLORIDE 600; 310; 30; 20 MG/100ML; MG/100ML; MG/100ML; MG/100ML
INJECTION, SOLUTION INTRAVENOUS CONTINUOUS
Status: DISCONTINUED | OUTPATIENT
Start: 2018-06-01 | End: 2018-06-02 | Stop reason: HOSPADM

## 2018-06-01 RX ORDER — BUPIVACAINE HYDROCHLORIDE 2.5 MG/ML
30 INJECTION, SOLUTION EPIDURAL; INFILTRATION; INTRACAUDAL
Status: ACTIVE | OUTPATIENT
Start: 2018-06-01 | End: 2018-06-01

## 2018-06-01 RX ORDER — DEXAMETHASONE SODIUM PHOSPHATE 10 MG/ML
10 INJECTION, SOLUTION INTRAMUSCULAR; INTRAVENOUS
Status: ACTIVE | OUTPATIENT
Start: 2018-06-01 | End: 2018-06-01

## 2018-06-01 RX ORDER — FENTANYL CITRATE 50 UG/ML
100 INJECTION, SOLUTION INTRAMUSCULAR; INTRAVENOUS
Status: ACTIVE | OUTPATIENT
Start: 2018-06-01 | End: 2018-06-01

## 2018-06-01 RX ORDER — MIDAZOLAM HYDROCHLORIDE 1 MG/ML
2 INJECTION INTRAMUSCULAR; INTRAVENOUS
Status: ACTIVE | OUTPATIENT
Start: 2018-06-01 | End: 2018-06-01

## 2018-06-01 ASSESSMENT — PAIN - FUNCTIONAL ASSESSMENT
PAIN_FUNCTIONAL_ASSESSMENT: 0-10

## 2018-06-01 ASSESSMENT — PAIN DESCRIPTION - DESCRIPTORS: DESCRIPTORS: SHOOTING;ACHING

## 2018-06-02 LAB — HLA B27: NEGATIVE

## 2018-06-04 LAB
ANTI DNA DOUBLE STRANDED: 11 IU/ML
ANTI SSA: 8 U/ML
ANTI SSB: 4 U/ML
ANTI-NUCLEAR ANTIBODY (ANA): NEGATIVE
ANTI-SMITH: 11 U/ML

## 2018-06-05 ENCOUNTER — OFFICE VISIT (OUTPATIENT)
Dept: PHYSICAL MEDICINE AND REHAB | Age: 28
End: 2018-06-05
Payer: COMMERCIAL

## 2018-06-05 VITALS
HEART RATE: 92 BPM | DIASTOLIC BLOOD PRESSURE: 82 MMHG | TEMPERATURE: 99.2 F | BODY MASS INDEX: 35.42 KG/M2 | WEIGHT: 247.4 LBS | SYSTOLIC BLOOD PRESSURE: 115 MMHG | HEIGHT: 70 IN

## 2018-06-05 DIAGNOSIS — M47.816 LUMBAR SPONDYLOSIS: ICD-10-CM

## 2018-06-05 DIAGNOSIS — M54.16 LUMBAR RADICULOPATHY: Primary | ICD-10-CM

## 2018-06-05 DIAGNOSIS — M51.26 LUMBAR DISC HERNIATION: Chronic | ICD-10-CM

## 2018-06-05 DIAGNOSIS — M43.16 SPONDYLOLISTHESIS AT L4-L5 LEVEL: ICD-10-CM

## 2018-06-05 PROCEDURE — G8427 DOCREV CUR MEDS BY ELIG CLIN: HCPCS | Performed by: PHYSICAL MEDICINE & REHABILITATION

## 2018-06-05 PROCEDURE — G8417 CALC BMI ABV UP PARAM F/U: HCPCS | Performed by: PHYSICAL MEDICINE & REHABILITATION

## 2018-06-05 PROCEDURE — 99214 OFFICE O/P EST MOD 30 MIN: CPT | Performed by: PHYSICAL MEDICINE & REHABILITATION

## 2018-06-05 PROCEDURE — 1036F TOBACCO NON-USER: CPT | Performed by: PHYSICAL MEDICINE & REHABILITATION

## 2018-06-06 ENCOUNTER — OFFICE VISIT (OUTPATIENT)
Dept: NEUROLOGY | Age: 28
End: 2018-06-06
Payer: COMMERCIAL

## 2018-06-06 VITALS
DIASTOLIC BLOOD PRESSURE: 66 MMHG | HEART RATE: 83 BPM | HEIGHT: 70 IN | BODY MASS INDEX: 35.71 KG/M2 | SYSTOLIC BLOOD PRESSURE: 122 MMHG | WEIGHT: 249.4 LBS

## 2018-06-06 DIAGNOSIS — G47.30 SLEEP APNEA, UNSPECIFIED TYPE: Primary | ICD-10-CM

## 2018-06-06 DIAGNOSIS — R51.9 CHRONIC DAILY HEADACHE: ICD-10-CM

## 2018-06-06 DIAGNOSIS — R41.3 MEMORY LOSS: ICD-10-CM

## 2018-06-06 PROCEDURE — G8427 DOCREV CUR MEDS BY ELIG CLIN: HCPCS | Performed by: PSYCHIATRY & NEUROLOGY

## 2018-06-06 PROCEDURE — 99244 OFF/OP CNSLTJ NEW/EST MOD 40: CPT | Performed by: PSYCHIATRY & NEUROLOGY

## 2018-06-06 PROCEDURE — G8417 CALC BMI ABV UP PARAM F/U: HCPCS | Performed by: PSYCHIATRY & NEUROLOGY

## 2018-06-06 RX ORDER — AMITRIPTYLINE HYDROCHLORIDE 25 MG/1
TABLET, FILM COATED ORAL
Qty: 120 TABLET | Refills: 1 | Status: SHIPPED | OUTPATIENT
Start: 2018-06-06 | End: 2018-10-31

## 2018-06-08 ENCOUNTER — HOSPITAL ENCOUNTER (OUTPATIENT)
Dept: PAIN MANAGEMENT | Age: 28
Discharge: HOME | End: 2018-06-08
Payer: COMMERCIAL

## 2018-06-08 VITALS
TEMPERATURE: 98.7 F | SYSTOLIC BLOOD PRESSURE: 182 MMHG | RESPIRATION RATE: 14 BRPM | DIASTOLIC BLOOD PRESSURE: 91 MMHG | HEART RATE: 68 BPM

## 2018-06-08 DIAGNOSIS — M51.26 LUMBAR DISC HERNIATION: Primary | Chronic | ICD-10-CM

## 2018-06-08 PROCEDURE — 99214 OFFICE O/P EST MOD 30 MIN: CPT | Performed by: PAIN MEDICINE

## 2018-06-08 PROCEDURE — 99214 OFFICE O/P EST MOD 30 MIN: CPT

## 2018-06-08 ASSESSMENT — ENCOUNTER SYMPTOMS
BOWEL INCONTINENCE: 0
BACK PAIN: 1

## 2018-06-19 ENCOUNTER — HOSPITAL ENCOUNTER (OUTPATIENT)
Dept: MRI IMAGING | Age: 28
Discharge: HOME | End: 2018-06-21
Payer: COMMERCIAL

## 2018-06-19 DIAGNOSIS — M51.26 LUMBAR DISC HERNIATION: Chronic | ICD-10-CM

## 2018-06-19 PROCEDURE — 72148 MRI LUMBAR SPINE W/O DYE: CPT

## 2018-06-21 ENCOUNTER — HOSPITAL ENCOUNTER (OUTPATIENT)
Dept: SLEEP CENTER | Age: 28
Discharge: HOME | End: 2018-06-23
Payer: COMMERCIAL

## 2018-06-21 DIAGNOSIS — G47.30 SLEEP APNEA, UNSPECIFIED TYPE: ICD-10-CM

## 2018-06-21 PROCEDURE — 95810 POLYSOM 6/> YRS 4/> PARAM: CPT

## 2018-06-21 ASSESSMENT — SLEEP AND FATIGUE QUESTIONNAIRES
HOW LIKELY ARE YOU TO NOD OFF OR FALL ASLEEP WHILE WATCHING TV: 2
HOW LIKELY ARE YOU TO NOD OFF OR FALL ASLEEP IN A CAR, WHILE STOPPED FOR A FEW MINUTES IN TRAFFIC: 0
HOW LIKELY ARE YOU TO NOD OFF OR FALL ASLEEP WHEN YOU ARE A PASSENGER IN A CAR FOR AN HOUR WITHOUT A BREAK: 3
ESS TOTAL SCORE: 11
HOW LIKELY ARE YOU TO NOD OFF OR FALL ASLEEP WHILE SITTING AND READING: 3
HOW LIKELY ARE YOU TO NOD OFF OR FALL ASLEEP WHILE SITTING QUIETLY AFTER LUNCH WITHOUT ALCOHOL: 0
HOW LIKELY ARE YOU TO NOD OFF OR FALL ASLEEP WHILE SITTING INACTIVE IN A PUBLIC PLACE: 1
HOW LIKELY ARE YOU TO NOD OFF OR FALL ASLEEP WHILE SITTING AND TALKING TO SOMEONE: 0
HOW LIKELY ARE YOU TO NOD OFF OR FALL ASLEEP WHILE LYING DOWN TO REST IN THE AFTERNOON WHEN CIRCUMSTANCES PERMIT: 2

## 2018-06-22 ENCOUNTER — HOSPITAL ENCOUNTER (OUTPATIENT)
Dept: PAIN MANAGEMENT | Age: 28
Discharge: HOME | End: 2018-06-22
Payer: COMMERCIAL

## 2018-06-22 VITALS — HEART RATE: 90 BPM | WEIGHT: 249 LBS | BODY MASS INDEX: 35.65 KG/M2 | HEIGHT: 70 IN | RESPIRATION RATE: 14 BRPM

## 2018-06-22 VITALS
TEMPERATURE: 98.4 F | RESPIRATION RATE: 14 BRPM | DIASTOLIC BLOOD PRESSURE: 78 MMHG | HEART RATE: 76 BPM | SYSTOLIC BLOOD PRESSURE: 146 MMHG

## 2018-06-22 PROCEDURE — 99214 OFFICE O/P EST MOD 30 MIN: CPT | Performed by: PAIN MEDICINE

## 2018-06-22 PROCEDURE — 99214 OFFICE O/P EST MOD 30 MIN: CPT

## 2018-06-22 ASSESSMENT — ENCOUNTER SYMPTOMS
BACK PAIN: 1
BOWEL INCONTINENCE: 0

## 2018-06-30 LAB — STATUS: NORMAL

## 2018-07-09 ENCOUNTER — TELEPHONE (OUTPATIENT)
Dept: NEUROSURGERY | Age: 28
End: 2018-07-09

## 2018-07-09 NOTE — TELEPHONE ENCOUNTER
My Chart message from Patient:    From  Ronnie Vides To  [facility] Clinical Staff Sent  6/22/2018 12:44 PM   Pain Management doc ordered new MRI of lumbar spine. I just had a follow up with him and he asked that I set up a new appointment with you to look over the MRI, and see if you have any other suggestions before spinal cord stimulator is decided upon. After looking at the MRI report personally I am wondering if you would expound on the \"tarlov cysts seen at S2\": as to whether or not it could be a part of the sciatic pain or the other pain locations. Also I had blood work done to look for arthritis markers. I look forward to hearing from you.    Ronnie Vides

## 2018-07-09 NOTE — TELEPHONE ENCOUNTER
Attempted to call pt - if you can get a hold of her let her know I do not think the cyst is symptomatic and I can speak to her over phone about mri

## 2018-08-07 ENCOUNTER — TELEPHONE (OUTPATIENT)
Dept: UROLOGY | Age: 28
End: 2018-08-07

## 2018-08-07 NOTE — LETTER
[facility] - [address]  [address] [facility] [address]  Phone: [phone]  Fax: [phone], APRN - CNP        August 7, 2018     Skyler Bender  [address] [facility] [id number]      Dear Stephanie Beatty: We are sorry that you missed your appointment with Justo Segura on 8/7/2018. Your health and follow-up medical care are important to us. Please call our office as soon as possible so that we may reschedule your appointment. If you have already rescheduled your appointment, please disregard this letter.     Sincerely,        DEAN Connell CNP

## 2018-10-11 ENCOUNTER — HOSPITAL ENCOUNTER (OUTPATIENT)
Dept: PAIN MANAGEMENT | Age: 28
Discharge: HOME | End: 2018-10-11
Payer: COMMERCIAL

## 2018-10-11 VITALS
HEIGHT: 70 IN | HEART RATE: 111 BPM | DIASTOLIC BLOOD PRESSURE: 79 MMHG | WEIGHT: 245 LBS | TEMPERATURE: 98.3 F | SYSTOLIC BLOOD PRESSURE: 134 MMHG | BODY MASS INDEX: 35.07 KG/M2 | RESPIRATION RATE: 16 BRPM

## 2018-10-11 PROCEDURE — 99214 OFFICE O/P EST MOD 30 MIN: CPT

## 2018-10-11 PROCEDURE — 99213 OFFICE O/P EST LOW 20 MIN: CPT | Performed by: PAIN MEDICINE

## 2018-10-11 ASSESSMENT — ENCOUNTER SYMPTOMS
BOWEL INCONTINENCE: 0
BACK PAIN: 1

## 2018-10-11 NOTE — PROGRESS NOTES
Vomiting         Current Outpatient Prescriptions:     thyroid (ARMOUR) 120 MG tablet, Take 1 tablet by mouth daily, Disp: 30 tablet, Rfl: 3    naproxen (NAPROSYN) 500 MG tablet, Take 1 tablet by mouth 2 times daily (with meals), Disp: 120 tablet, Rfl: 3    loratadine (CLARITIN) 10 MG tablet, Take 1 tablet by mouth daily, Disp: 30 tablet, Rfl: 2    Misc. Devices (ROLLER WALKER) MISC, Walker with wheels, Disp: 1 each, Rfl: 0    acetaminophen-codeine (TYLENOL/CODEINE #3) 300-30 MG per tablet, Take 1 tablet by mouth 3 times daily as needed for Pain for up to 30 doses. , Disp: 30 tablet, Rfl: 0    gabapentin (NEURONTIN) 300 MG capsule, Take 2 capsules by mouth 3 times daily for 30 days. , Disp: 180 capsule, Rfl: 0    tiZANidine (ZANAFLEX) 4 MG tablet, take 1 tablet by mouth three times a day, Disp: , Rfl: 0    Calcium Carbonate-Vitamin D (OYSTER SHELL CALCIUM/D) 500-200 MG-UNIT TABS, Take 1 tablet by mouth daily, Disp: 30 tablet, Rfl: 3    fluticasone (FLONASE) 50 MCG/ACT nasal spray, 1 spray by Nasal route daily, Disp: 1 Bottle, Rfl: 3    albuterol sulfate  (90 BASE) MCG/ACT inhaler, Inhale 2 puffs into the lungs every 6 hours as needed for Wheezing or Shortness of Breath, Disp: 1 Inhaler, Rfl: 1    Respiratory Therapy Supplies (BREATHERITE VALVED MDI CHAMBER) RICHARD, To be used with inhaler, Disp: 1 Device, Rfl: 1    amitriptyline (ELAVIL) 25 MG tablet, Take 1 tab (25 mg) at bedtime for 1 week, then 2 tabs (50 mg) for 1 week, then 3 tabs (75 mg) for 1 week, then 4 tabs (100 mg) thereafter, Disp: 120 tablet, Rfl: 1    diclofenac sodium 1 % GEL, Apply 2 g topically 2 times daily, Disp: 1 Tube, Rfl: 3    Family History   Problem Relation Age of Onset    Diabetes Paternal Grandmother     Heart Failure Maternal Grandmother     Heart Failure Maternal Grandfather     Diabetes Father        Social History     Social History    Marital status:      Spouse name: N/A    Number of children: N/A   

## 2018-10-31 ENCOUNTER — OFFICE VISIT (OUTPATIENT)
Dept: FAMILY MEDICINE CLINIC | Age: 28
End: 2018-10-31
Payer: COMMERCIAL

## 2018-10-31 ENCOUNTER — HOSPITAL ENCOUNTER (OUTPATIENT)
Age: 28
Discharge: HOME | End: 2018-10-31
Payer: COMMERCIAL

## 2018-10-31 VITALS
SYSTOLIC BLOOD PRESSURE: 125 MMHG | OXYGEN SATURATION: 97 % | WEIGHT: 250 LBS | DIASTOLIC BLOOD PRESSURE: 89 MMHG | HEART RATE: 96 BPM | BODY MASS INDEX: 35.79 KG/M2 | HEIGHT: 70 IN

## 2018-10-31 DIAGNOSIS — R76.8 ANTI-RNP ANTIBODIES PRESENT: ICD-10-CM

## 2018-10-31 DIAGNOSIS — E55.9 VITAMIN D DEFICIENCY: ICD-10-CM

## 2018-10-31 DIAGNOSIS — M51.26 LUMBAR DISC HERNIATION: Primary | ICD-10-CM

## 2018-10-31 DIAGNOSIS — M54.16 LUMBAR RADICULOPATHY: ICD-10-CM

## 2018-10-31 DIAGNOSIS — E03.8 SUBCLINICAL HYPOTHYROIDISM: ICD-10-CM

## 2018-10-31 PROCEDURE — G8427 DOCREV CUR MEDS BY ELIG CLIN: HCPCS | Performed by: FAMILY MEDICINE

## 2018-10-31 PROCEDURE — 36415 COLL VENOUS BLD VENIPUNCTURE: CPT

## 2018-10-31 PROCEDURE — 1036F TOBACCO NON-USER: CPT | Performed by: FAMILY MEDICINE

## 2018-10-31 PROCEDURE — 86235 NUCLEAR ANTIGEN ANTIBODY: CPT

## 2018-10-31 PROCEDURE — G8417 CALC BMI ABV UP PARAM F/U: HCPCS | Performed by: FAMILY MEDICINE

## 2018-10-31 PROCEDURE — 99214 OFFICE O/P EST MOD 30 MIN: CPT | Performed by: FAMILY MEDICINE

## 2018-10-31 PROCEDURE — G8484 FLU IMMUNIZE NO ADMIN: HCPCS | Performed by: FAMILY MEDICINE

## 2018-10-31 RX ORDER — ACETAMINOPHEN AND CODEINE PHOSPHATE 300; 30 MG/1; MG/1
1 TABLET ORAL 3 TIMES DAILY PRN
Qty: 30 TABLET | Refills: 0 | Status: SHIPPED | OUTPATIENT
Start: 2018-10-31 | End: 2019-02-28 | Stop reason: SDUPTHER

## 2018-10-31 RX ORDER — TIZANIDINE 4 MG/1
TABLET ORAL
Qty: 60 TABLET | Refills: 3 | Status: SHIPPED | OUTPATIENT
Start: 2018-10-31 | End: 2019-02-28 | Stop reason: SDUPTHER

## 2018-10-31 RX ORDER — B-COMPLEX WITH VITAMIN C
1 TABLET ORAL DAILY
Qty: 30 TABLET | Refills: 3 | Status: SHIPPED | OUTPATIENT
Start: 2018-10-31 | End: 2020-10-07 | Stop reason: ALTCHOICE

## 2018-10-31 ASSESSMENT — ENCOUNTER SYMPTOMS
SINUS PRESSURE: 0
CONSTIPATION: 0
SHORTNESS OF BREATH: 0
ABDOMINAL DISTENTION: 0
BLOOD IN STOOL: 0
WHEEZING: 0
BACK PAIN: 1
PHOTOPHOBIA: 0
COUGH: 0

## 2018-10-31 ASSESSMENT — PATIENT HEALTH QUESTIONNAIRE - PHQ9
SUM OF ALL RESPONSES TO PHQ9 QUESTIONS 1 & 2: 0
SUM OF ALL RESPONSES TO PHQ QUESTIONS 1-9: 0
SUM OF ALL RESPONSES TO PHQ QUESTIONS 1-9: 0
1. LITTLE INTEREST OR PLEASURE IN DOING THINGS: 0
2. FEELING DOWN, DEPRESSED OR HOPELESS: 0

## 2018-10-31 NOTE — PROGRESS NOTES
acetaminophen-codeine (TYLENOL/CODEINE #3) 300-30 MG per tablet; Take 1 tablet by mouth 3 times daily as needed for Pain for up to 30 doses. .  Dispense: 30 tablet; Refill: 0  - tiZANidine (ZANAFLEX) 4 MG tablet; take 1 tablet by mouth three times a day  Dispense: 60 tablet; Refill: 3  - Handicap Placard MISC; by Does not apply route Expires on 10/31/19  Dispense: 1 each; Refill: 0    3. Vitamin D deficiency  -  - Calcium Carbonate-Vitamin D (OYSTER SHELL CALCIUM/D) 500-200 MG-UNIT TABS; Take 1 tablet by mouth daily  Dispense: 30 tablet; Refill: 3    4. Anti-RNP antibodies present  -We'll repeat RNP, rheumatology workup negative if it will be high again will do  Referral to  - Anti-RNP (MYKEL Ab); Future    5. Subclinical hypothyroidism  -Stable on current treatment      Orders Placed This Encounter   Procedures    Anti-RNP (MYKEL Ab)     Standing Status:   Future     Standing Expiration Date:   4/30/2020         Medications Discontinued During This Encounter   Medication Reason    amitriptyline (ELAVIL) 25 MG tablet Patient Choice    acetaminophen-codeine (TYLENOL/CODEINE #3) 300-30 MG per tablet REORDER    tiZANidine (ZANAFLEX) 4 MG tablet REORDER    Calcium Carbonate-Vitamin D (OYSTER SHELL CALCIUM/D) 500-200 MG-UNIT TABS REORDER       Kala Carnes received counseling on the following healthy behaviors: nutrition, exercise, medication adherence and tobacco cessation  Reviewed prior labs and health maintenance  Continue current medications, diet and exercise. Discussed use, benefit, and side effects of prescribed medications. Barriers to medication compliance addressed. Patient given educational materials - see patient instructions  Was a self-tracking handout given in paper form or via String [employer]?  Yes    Requested Prescriptions     Signed Prescriptions Disp Refills    acetaminophen-codeine (TYLENOL/CODEINE #3) 300-30 MG per tablet 30 tablet 0     Sig: Take 1 tablet by mouth 3 times daily as needed for Pain for up to 30 doses. Lisset Soto  tiZANidine (ZANAFLEX) 4 MG tablet 60 tablet 3     Sig: take 1 tablet by mouth three times a day    Calcium Carbonate-Vitamin D (OYSTER SHELL CALCIUM/D) 500-200 MG-UNIT TABS 30 tablet 3     Sig: Take 1 tablet by mouth daily    Handicap Placard MISC 1 each 0     Sig: by Does not apply route Expires on 10/31/19       All patient questions answered. Patient voiced understanding. Quality Measures    Body mass index is 35.87 kg/m². Elevated. Weight control planned discussed Healthy diet and regular exercise. BP: 125/89 Blood pressure is normal. Treatment plan consists of No treatment change needed. No results found for: LDLCALC, LDLCHOLESTEROL, LDLDIRECT (goal LDL reduction with dx if diabetes is 50% LDL reduction)      PHQ Scores 10/31/2018 1/9/2018 12/27/2016   PHQ2 Score 0 0 0   PHQ9 Score 0 0 0     Interpretation of Total Score Depression Severity: 1-4 = Minimal depression, 5-9 = Mild depression, 10-14 = Moderate depression, 15-19 = Moderately severe depression, 20-27 = Severe depression    The patient'spast medical, surgical, social, and family history as well as her   current medications and allergies were reviewed as documented in today's encounter. Medications, labs, diagnostic studies, consultations andfollow-up as documented in this encounter. Return in about 4 months (around 2/28/2019) for lumbar radiculopathy , disc herniation . Patient wasseen with total face to face time of 25 minutes. More than 50% of this visit was counseling and education. Future Appointments  Date Time Provider Westley Smith   11/13/2018 1:40 PM Zoraida Healy MD [facility]   2/28/2019 9:30 AM Roberta Ayala MD [facility] [address] [facility]     This note was completed by using the assistance of a speech-recognition program. However, inadvertent computerized transcription errors may be present.  Althoughevery effort was made to ensure accuracy, no guarantees can be provided that every mistake

## 2018-11-01 LAB — ANTI RNP AB: 29 U/ML

## 2018-11-13 ENCOUNTER — OFFICE VISIT (OUTPATIENT)
Dept: NEUROLOGY | Age: 28
End: 2018-11-13
Payer: COMMERCIAL

## 2018-11-13 VITALS
SYSTOLIC BLOOD PRESSURE: 121 MMHG | HEIGHT: 69 IN | HEART RATE: 80 BPM | DIASTOLIC BLOOD PRESSURE: 76 MMHG | BODY MASS INDEX: 36.94 KG/M2 | WEIGHT: 249.4 LBS

## 2018-11-13 DIAGNOSIS — R41.3 MEMORY LOSS: ICD-10-CM

## 2018-11-13 DIAGNOSIS — G89.29 CHRONIC NONINTRACTABLE HEADACHE, UNSPECIFIED HEADACHE TYPE: Primary | ICD-10-CM

## 2018-11-13 DIAGNOSIS — M54.16 LUMBAR RADICULOPATHY: ICD-10-CM

## 2018-11-13 DIAGNOSIS — R51.9 CHRONIC NONINTRACTABLE HEADACHE, UNSPECIFIED HEADACHE TYPE: Primary | ICD-10-CM

## 2018-11-13 PROCEDURE — 99214 OFFICE O/P EST MOD 30 MIN: CPT | Performed by: PSYCHIATRY & NEUROLOGY

## 2018-11-13 PROCEDURE — G8484 FLU IMMUNIZE NO ADMIN: HCPCS | Performed by: PSYCHIATRY & NEUROLOGY

## 2018-11-13 PROCEDURE — G8427 DOCREV CUR MEDS BY ELIG CLIN: HCPCS | Performed by: PSYCHIATRY & NEUROLOGY

## 2018-11-13 PROCEDURE — G8417 CALC BMI ABV UP PARAM F/U: HCPCS | Performed by: PSYCHIATRY & NEUROLOGY

## 2018-11-13 PROCEDURE — 1036F TOBACCO NON-USER: CPT | Performed by: PSYCHIATRY & NEUROLOGY

## 2018-11-13 NOTE — PROGRESS NOTES
Thyroid disease         PAST SURGICAL HISTORY:         Procedure Laterality Date     SECTION  4/11/10    NERVE BLOCK Bilateral 2018    bilat facet block no steroid        SOCIAL HISTORY:     Social History     Social History    Marital status:      Spouse name: N/A    Number of children: N/A    Years of education: N/A     Occupational History    Not on file. Social History Main Topics    Smoking status: Never Smoker    Smokeless tobacco: Never Used    Alcohol use No    Drug use: No    Sexual activity: Not Currently     Partners: Male     Other Topics Concern    Not on file     Social History Narrative    No narrative on file       CURRENT MEDICATIONS:     Current Outpatient Prescriptions   Medication Sig Dispense Refill    acetaminophen-codeine (TYLENOL/CODEINE #3) 300-30 MG per tablet Take 1 tablet by mouth 3 times daily as needed for Pain for up to 30 doses. . 30 tablet 0    tiZANidine (ZANAFLEX) 4 MG tablet take 1 tablet by mouth three times a day 60 tablet 3    Calcium Carbonate-Vitamin D (OYSTER SHELL CALCIUM/D) 500-200 MG-UNIT TABS Take 1 tablet by mouth daily 30 tablet 3    Handicap Placard MISC by Does not apply route Expires on 10/31/19 1 each 0    thyroid (ARMOUR) 120 MG tablet Take 1 tablet by mouth daily 30 tablet 3    naproxen (NAPROSYN) 500 MG tablet Take 1 tablet by mouth 2 times daily (with meals) 120 tablet 3    loratadine (CLARITIN) 10 MG tablet Take 1 tablet by mouth daily 30 tablet 2    Misc. Devices (ROLLER WALKER) MISC Walker with wheels 1 each 0    gabapentin (NEURONTIN) 300 MG capsule Take 2 capsules by mouth 3 times daily for 30 days.  180 capsule 0    diclofenac sodium 1 % GEL Apply 2 g topically 2 times daily 1 Tube 3    fluticasone (FLONASE) 50 MCG/ACT nasal spray 1 spray by Nasal route daily 1 Bottle 3    albuterol sulfate  (90 BASE) MCG/ACT inhaler Inhale 2 puffs into the lungs every 6 hours as needed for Wheezing or Shortness of

## 2018-11-21 ENCOUNTER — HOSPITAL ENCOUNTER (OUTPATIENT)
Dept: MRI IMAGING | Age: 28
Discharge: HOME | End: 2018-11-23
Payer: COMMERCIAL

## 2018-11-21 DIAGNOSIS — G89.29 CHRONIC NONINTRACTABLE HEADACHE, UNSPECIFIED HEADACHE TYPE: ICD-10-CM

## 2018-11-21 DIAGNOSIS — R51.9 CHRONIC NONINTRACTABLE HEADACHE, UNSPECIFIED HEADACHE TYPE: ICD-10-CM

## 2018-11-21 PROCEDURE — 70551 MRI BRAIN STEM W/O DYE: CPT

## 2019-02-28 ENCOUNTER — OFFICE VISIT (OUTPATIENT)
Dept: FAMILY MEDICINE CLINIC | Age: 29
End: 2019-02-28
Payer: MEDICARE

## 2019-02-28 ENCOUNTER — HOSPITAL ENCOUNTER (OUTPATIENT)
Age: 29
Discharge: HOME | End: 2019-02-28
Payer: MEDICARE

## 2019-02-28 VITALS
WEIGHT: 252.6 LBS | OXYGEN SATURATION: 99 % | DIASTOLIC BLOOD PRESSURE: 84 MMHG | SYSTOLIC BLOOD PRESSURE: 130 MMHG | HEART RATE: 94 BPM | BODY MASS INDEX: 36.16 KG/M2 | HEIGHT: 70 IN

## 2019-02-28 DIAGNOSIS — E03.8 SUBCLINICAL HYPOTHYROIDISM: ICD-10-CM

## 2019-02-28 DIAGNOSIS — R76.8 ANTI-RNP ANTIBODIES PRESENT: ICD-10-CM

## 2019-02-28 DIAGNOSIS — J30.9 ALLERGIC SINUSITIS: ICD-10-CM

## 2019-02-28 DIAGNOSIS — M51.26 LUMBAR DISC HERNIATION: ICD-10-CM

## 2019-02-28 DIAGNOSIS — Z13.1 SCREENING FOR DIABETES MELLITUS: ICD-10-CM

## 2019-02-28 DIAGNOSIS — M54.16 LUMBAR RADICULOPATHY: ICD-10-CM

## 2019-02-28 DIAGNOSIS — J45.20 MILD INTERMITTENT REACTIVE AIRWAY DISEASE WITHOUT COMPLICATION: Primary | ICD-10-CM

## 2019-02-28 PROBLEM — R68.2 DRY MOUTH: Status: RESOLVED | Noted: 2017-05-31 | Resolved: 2019-02-28

## 2019-02-28 PROBLEM — M70.62 TROCHANTERIC BURSITIS OF BOTH HIPS: Status: RESOLVED | Noted: 2018-03-13 | Resolved: 2019-02-28

## 2019-02-28 PROBLEM — R59.9 ENLARGED LYMPH NODE: Status: RESOLVED | Noted: 2017-05-03 | Resolved: 2019-02-28

## 2019-02-28 PROBLEM — M70.61 TROCHANTERIC BURSITIS OF BOTH HIPS: Status: RESOLVED | Noted: 2018-03-13 | Resolved: 2019-02-28

## 2019-02-28 PROBLEM — H10.13 ALLERGIC CONJUNCTIVITIS OF BOTH EYES: Status: RESOLVED | Noted: 2018-05-24 | Resolved: 2019-02-28

## 2019-02-28 LAB
HBA1C MFR BLD: 5.3 %
THYROXINE, FREE: 0.9 NG/DL (ref 0.93–1.7)
TSH SERPL DL<=0.05 MIU/L-ACNC: 6.52 MIU/L (ref 0.3–5)

## 2019-02-28 PROCEDURE — G8427 DOCREV CUR MEDS BY ELIG CLIN: HCPCS | Performed by: FAMILY MEDICINE

## 2019-02-28 PROCEDURE — 83036 HEMOGLOBIN GLYCOSYLATED A1C: CPT | Performed by: FAMILY MEDICINE

## 2019-02-28 PROCEDURE — 99214 OFFICE O/P EST MOD 30 MIN: CPT | Performed by: FAMILY MEDICINE

## 2019-02-28 PROCEDURE — 1036F TOBACCO NON-USER: CPT | Performed by: FAMILY MEDICINE

## 2019-02-28 PROCEDURE — G8417 CALC BMI ABV UP PARAM F/U: HCPCS | Performed by: FAMILY MEDICINE

## 2019-02-28 PROCEDURE — 84443 ASSAY THYROID STIM HORMONE: CPT

## 2019-02-28 PROCEDURE — 36415 COLL VENOUS BLD VENIPUNCTURE: CPT

## 2019-02-28 PROCEDURE — G8484 FLU IMMUNIZE NO ADMIN: HCPCS | Performed by: FAMILY MEDICINE

## 2019-02-28 PROCEDURE — 84439 ASSAY OF FREE THYROXINE: CPT

## 2019-02-28 RX ORDER — ACETAMINOPHEN AND CODEINE PHOSPHATE 300; 30 MG/1; MG/1
1 TABLET ORAL 3 TIMES DAILY PRN
Qty: 30 TABLET | Refills: 0 | Status: SHIPPED | OUTPATIENT
Start: 2019-02-28 | End: 2019-11-30

## 2019-02-28 RX ORDER — LEVOTHYROXINE AND LIOTHYRONINE 76; 18 UG/1; UG/1
120 TABLET ORAL DAILY
Qty: 30 TABLET | Refills: 3 | Status: SHIPPED | OUTPATIENT
Start: 2019-02-28 | End: 2019-03-01 | Stop reason: SDUPTHER

## 2019-02-28 RX ORDER — NAPROXEN 500 MG/1
500 TABLET ORAL 2 TIMES DAILY WITH MEALS
Qty: 120 TABLET | Refills: 3 | Status: SHIPPED | OUTPATIENT
Start: 2019-02-28 | End: 2021-03-16 | Stop reason: ALTCHOICE

## 2019-02-28 RX ORDER — TIZANIDINE 4 MG/1
TABLET ORAL
Qty: 60 TABLET | Refills: 3 | Status: SHIPPED | OUTPATIENT
Start: 2019-02-28 | End: 2019-11-24 | Stop reason: SDUPTHER

## 2019-02-28 RX ORDER — LORATADINE 10 MG/1
10 TABLET ORAL DAILY
Qty: 30 TABLET | Refills: 2 | Status: SHIPPED | OUTPATIENT
Start: 2019-02-28 | End: 2021-03-16 | Stop reason: ALTCHOICE

## 2019-02-28 ASSESSMENT — ENCOUNTER SYMPTOMS
WHEEZING: 0
COUGH: 0
SORE THROAT: 0
VOMITING: 0
ABDOMINAL DISTENTION: 0
SINUS PAIN: 0
SINUS PRESSURE: 0
PHOTOPHOBIA: 0
ABDOMINAL PAIN: 0
SHORTNESS OF BREATH: 0
RHINORRHEA: 0
CONSTIPATION: 0
BACK PAIN: 1
COLOR CHANGE: 0

## 2019-02-28 ASSESSMENT — PATIENT HEALTH QUESTIONNAIRE - PHQ9
SUM OF ALL RESPONSES TO PHQ QUESTIONS 1-9: 0
1. LITTLE INTEREST OR PLEASURE IN DOING THINGS: 0
SUM OF ALL RESPONSES TO PHQ QUESTIONS 1-9: 0
SUM OF ALL RESPONSES TO PHQ9 QUESTIONS 1 & 2: 0
2. FEELING DOWN, DEPRESSED OR HOPELESS: 0

## 2019-03-01 ENCOUNTER — TELEPHONE (OUTPATIENT)
Dept: FAMILY MEDICINE CLINIC | Age: 29
End: 2019-03-01

## 2019-03-01 DIAGNOSIS — E03.8 SUBCLINICAL HYPOTHYROIDISM: ICD-10-CM

## 2019-05-29 ENCOUNTER — OFFICE VISIT (OUTPATIENT)
Dept: NEUROSURGERY | Age: 29
End: 2019-05-29
Payer: MEDICARE

## 2019-05-29 VITALS
DIASTOLIC BLOOD PRESSURE: 86 MMHG | BODY MASS INDEX: 35.87 KG/M2 | SYSTOLIC BLOOD PRESSURE: 125 MMHG | HEART RATE: 91 BPM | WEIGHT: 250 LBS

## 2019-05-29 DIAGNOSIS — G96.191 TARLOV CYSTS: ICD-10-CM

## 2019-05-29 DIAGNOSIS — G93.5 CHIARI MALFORMATION TYPE I (HCC): Primary | ICD-10-CM

## 2019-05-29 PROCEDURE — 99213 OFFICE O/P EST LOW 20 MIN: CPT | Performed by: NEUROLOGICAL SURGERY

## 2019-05-29 PROCEDURE — G8417 CALC BMI ABV UP PARAM F/U: HCPCS | Performed by: NEUROLOGICAL SURGERY

## 2019-05-29 PROCEDURE — 1036F TOBACCO NON-USER: CPT | Performed by: NEUROLOGICAL SURGERY

## 2019-05-29 PROCEDURE — G8427 DOCREV CUR MEDS BY ELIG CLIN: HCPCS | Performed by: NEUROLOGICAL SURGERY

## 2019-05-29 NOTE — PROGRESS NOTES
Past Surgical History:   Procedure Laterality Date     SECTION  4/11/10    NERVE BLOCK Bilateral 2018    bilat facet block no steroid     Family History   Problem Relation Age of Onset    Diabetes Paternal Grandmother     Heart Failure Maternal Grandmother     Heart Failure Maternal Grandfather     Diabetes Father      Current Outpatient Medications on File Prior to Visit   Medication Sig Dispense Refill    thyroid (ARMOUR) 130 MG tablet Take 1 tablet by mouth daily 60 tablet 3    loratadine (CLARITIN) 10 MG tablet Take 1 tablet by mouth daily 30 tablet 2    acetaminophen-codeine (TYLENOL/CODEINE #3) 300-30 MG per tablet Take 1 tablet by mouth 3 times daily as needed for Pain for up to 30 doses. . 30 tablet 0    naproxen (NAPROSYN) 500 MG tablet Take 1 tablet by mouth 2 times daily (with meals) 120 tablet 3    tiZANidine (ZANAFLEX) 4 MG tablet take 1 tablet by mouth three times a day 60 tablet 3    Calcium Carbonate-Vitamin D (OYSTER SHELL CALCIUM/D) 500-200 MG-UNIT TABS Take 1 tablet by mouth daily 30 tablet 3    Handicap Placard MISC by Does not apply route Expires on 10/31/19 1 each 0    Misc. Devices ([employer]) [address] with wheels 1 each 0    diclofenac sodium 1 % GEL Apply 2 g topically 2 times daily 1 Tube 3    fluticasone (FLONASE) 50 MCG/ACT nasal spray 1 spray by Nasal route daily 1 Bottle 3    albuterol sulfate  (90 BASE) MCG/ACT inhaler Inhale 2 puffs into the lungs every 6 hours as needed for Wheezing or Shortness of Breath 1 Inhaler 1    Respiratory Therapy Supplies (BREATHERITE VALVED MDI CHAMBER) RICHARD To be used with inhaler 1 Device 1     No current facility-administered medications on file prior to visit.       Social History     Tobacco Use    Smoking status: Never Smoker    Smokeless tobacco: Never Used   Substance Use Topics    Alcohol use: No    Drug use: No       Allergies   Allergen Reactions    Latex Hives and Swelling    Nut [Peanut-Containing Drug Products] Nausea And Vomiting       Review of Systems  Constitutional: Negative for activity change and appetite change. HENT: Negative for ear pain and facial swelling. Eyes: Negative for discharge and itching. Respiratory: Negative for choking and chest tightness. Cardiovascular: Negative for chest pain and leg swelling. Gastrointestinal: Negative for nausea and abdominal pain. Endocrine: Negative for cold intolerance and heat intolerance. Genitourinary: Negative for frequency and flank pain. Musculoskeletal: Negative for myalgias and joint swelling. Skin: Negative for rash and wound. Allergic/Immunologic: Negative for environmental allergies and food allergies. Hematological: Negative for adenopathy. Does not bruise/bleed easily. Psychiatric/Behavioral: Negative for self-injury. The patient is not nervous/anxious. Physical Exam:      /86 (Site: Right Upper Arm, Position: Sitting, Cuff Size: Large Adult)   Pulse 91   Wt 250 lb (113.4 kg)   BMI 35.87 kg/m²   Estimated body mass index is 35.87 kg/m² as calculated from the following:    Height as of 2/28/19: 5' 10\" (1.778 m). Weight as of this encounter: 250 lb (113.4 kg). General:  Oly Raya is a 26y.o. year old female who appears her stated age. HEENT: Normocephalic atraumatic. Neck supple. Chest: regular rate; pulses equal  Abdomen: Soft nontender nondistended. Normoactive bowel sounds.   Ext: DP and PT pulses 2+, good cap refill  Neuro    Mentation  Appropriate affect  Registration intact  Orientation intact  3 item recall intact  Judgement intact to situation    Cranial Nerves:   Pupils equal and reactive to light  Extraocular motion intact  Face and shrug symmetric  Tongue midline  No dysarthria  v1-3 sensation symmetric, masseter tone symmetric  Hearing symmetric and intact to finger rub    Sensation:   Intact    Motor  L deltoid 5/5; R deltoid 5/5  L biceps 5/5; R biceps 5/5  L triceps 5/5; R triceps 5/5  L wrist extension 5/5; R wrist extension 5/5  L intrinsics 5/5; R intrinsics 5/5     L iliopsoas 5/5 , R iliopsoas 5/5  L quadriceps 5/5; R quadriceps 5/5  L Dorsiflexion 5/5; R dorsiflexion 5/5  L Plantarflexion 5/5; R plantarflexion 5/5  L EHL 5/5; R EHL 5/5    Reflexes  L Brachioradialis 2+/4; R brachioradialis 2+/4  L Biceps 2+/4; R Biceps 2+/4  L Triceps 2+/4; R Triceps 2+/4  L Patellar 2+/4: R Patellar 2+/4  L Achilles 2+/4; R Achilles 2+/4    hoffmans L: neg  hoffmans R: neg  Clonus L: neg  Clonus R: neg  Babinski L: up  Babinski R; up    Studies Review:     MRI of the lumbar spine does reveal multilevel spondylosis from S2 down lower with some central canal stenosis of the sacral region. MRI brain reveals a very minimal Chiari mob formation approximately 45 mm not reaching the C1 arch. Assessment and Plan:      1. Chiari malformation type I ([address]    2. Tarlov cysts          Plan: Per the outside neurosurgery evaluation we will refer the patient for an S2 nerve block per pain management. We will also obtain a CSF flow study for her to be evaluated regarding the Chiari formation. I did explain to her that I have a fairly high threshold considering the morbid nature of the operation and a question will likely of success unless patient's truly have a classic clinical presentation. Followup: No follow-ups on file. Prescriptions Ordered:  No orders of the defined types were placed in this encounter. Orders Placed:  No orders of the defined types were placed in this encounter. Electronically signed by Patrick Hampton DO on 5/29/2019 at 9:55 AM    Please note that this chart was generated using voice recognition Dragon dictation software. Although every effort was made to ensure the accuracy of this automated transcription, some errors in transcription may have occurred.

## 2019-05-31 ENCOUNTER — HOSPITAL ENCOUNTER (OUTPATIENT)
Dept: PAIN MANAGEMENT | Age: 29
Discharge: HOME | End: 2019-05-31
Payer: MEDICARE

## 2019-05-31 VITALS
RESPIRATION RATE: 16 BRPM | TEMPERATURE: 98.1 F | DIASTOLIC BLOOD PRESSURE: 83 MMHG | HEIGHT: 70 IN | SYSTOLIC BLOOD PRESSURE: 117 MMHG | WEIGHT: 250 LBS | BODY MASS INDEX: 35.79 KG/M2 | HEART RATE: 102 BPM | OXYGEN SATURATION: 95 %

## 2019-05-31 DIAGNOSIS — M51.26 LUMBAR DISC HERNIATION: ICD-10-CM

## 2019-05-31 PROCEDURE — 99213 OFFICE O/P EST LOW 20 MIN: CPT

## 2019-05-31 PROCEDURE — 99214 OFFICE O/P EST MOD 30 MIN: CPT | Performed by: PAIN MEDICINE

## 2019-05-31 ASSESSMENT — PAIN DESCRIPTION - DIRECTION: RADIATING_TOWARDS: DOWN THE LEFT LEG

## 2019-05-31 ASSESSMENT — PAIN DESCRIPTION - PAIN TYPE: TYPE: CHRONIC PAIN

## 2019-05-31 ASSESSMENT — PAIN SCALES - GENERAL: PAINLEVEL_OUTOF10: 6

## 2019-05-31 ASSESSMENT — ENCOUNTER SYMPTOMS
BOWEL INCONTINENCE: 1
RESPIRATORY NEGATIVE: 1
ROS SKIN COMMENTS: DRY SKIN
BACK PAIN: 1
ALLERGIC/IMMUNOLOGIC NEGATIVE: 1

## 2019-05-31 ASSESSMENT — PAIN DESCRIPTION - FREQUENCY: FREQUENCY: CONTINUOUS

## 2019-05-31 ASSESSMENT — PAIN DESCRIPTION - ORIENTATION: ORIENTATION: LEFT;LOWER

## 2019-05-31 ASSESSMENT — PAIN DESCRIPTION - LOCATION: LOCATION: LEG;BACK

## 2019-05-31 ASSESSMENT — PAIN - FUNCTIONAL ASSESSMENT: PAIN_FUNCTIONAL_ASSESSMENT: PREVENTS OR INTERFERES SOME ACTIVE ACTIVITIES AND ADLS

## 2019-05-31 ASSESSMENT — PAIN DESCRIPTION - ONSET: ONSET: ON-GOING

## 2019-05-31 ASSESSMENT — PAIN DESCRIPTION - PROGRESSION: CLINICAL_PROGRESSION: GRADUALLY WORSENING

## 2019-05-31 NOTE — PROGRESS NOTES
HPI:     Chronic low back pain has no ongoing for some time. MRI lumbar spine with degnenerative changes at L4-5, also has have a Tarlov cyst at S2. Pain in the low back is worst activity and improved with rest.  Rates the pain as moderate severe depending what she doing. Difficult for her to ambulate due to the pain. She's been walking with a walker for some time. Has had several interventions in the past without benefit. I see her last year we were considering spinal cord stimulation. In the interim she has seen a specialist in [place] who suggested and S2 diagnostic local anesthetic only block. She has seen a surgeon here who also referred her to consider this. She has also been diagnosed with possible Chiari malformation and has a CSF study pending. Patient denies any new neurological symptoms. Nobowel or bladder incontinence, no weakness, and no falling. Review of OARRS does not show any aberrant prescription behavior. Medication is helping the patient stay active. Patient denies any side effects and reports adequate analgesia. No sign of misuse/abuse. Past Medical History:   Diagnosis Date    Allergic rhinitis     Complication of anesthesia     epidural stopped working    Hypothyroidism     Neuropathy     Osteoarthritis     Thyroid disease        Past Surgical History:   Procedure Laterality Date     SECTION  4/11/10    NERVE BLOCK Bilateral 2018    bilat facet block no steroid       Allergies   Allergen Reactions    Latex Hives and Swelling    Nut [Peanut-Containing Drug Products] Nausea And Vomiting         Current Outpatient Medications:     thyroid (ARMOUR) 130 MG tablet, Take 1 tablet by mouth daily, Disp: 60 tablet, Rfl: 3    loratadine (CLARITIN) 10 MG tablet, Take 1 tablet by mouth daily, Disp: 30 tablet, Rfl: 2    acetaminophen-codeine (TYLENOL/CODEINE #3) 300-30 MG per tablet, Take 1 tablet by mouth 3 times daily as needed for Pain for up to 30 doses. ., file     Minutes per session: Not on file    Stress: Not on file   Relationships    Social connections:     Talks on phone: Not on file     Gets together: Not on file     Attends Tenriism service: Not on file     Active member of club or organization: Not on file     Attends meetings of clubs or organizations: Not on file     Relationship status: Not on file    Intimate partner violence:     Fear of current or ex partner: Not on file     Emotionally abused: Not on file     Physically abused: Not on file     Forced sexual activity: Not on file   Other Topics Concern    Not on file   Social History Narrative    Not on file       Review of Systems:  Review of Systems   Constitution: Negative. HENT: Negative. Eyes: Positive for visual disturbance. Visual changes occasional problems, eye doc cleared pt   Cardiovascular: Negative. Respiratory: Negative. Endocrine: Negative. Hematologic/Lymphatic: Negative. Skin: Positive for dry skin. Dry skin     Musculoskeletal: Positive for back pain and falls. Uses walker. Gastrointestinal: Positive for bowel incontinence. Genitourinary: Positive for bladder incontinence. Neurological: Positive for dizziness, headaches and vertigo. Psychiatric/Behavioral: The patient is nervous/anxious. Allergic/Immunologic: Negative. Physical Exam:  /83   Pulse 102   Temp 98.1 °F (36.7 °C) (Oral)   Resp 16   Ht 5' 10\" (1.778 m)   Wt 250 lb (113.4 kg)   SpO2 95%   BMI 35.87 kg/m²     Physical Exam   Constitutional: She is oriented to person, place, and time. Musculoskeletal:        Lumbar back: She exhibits tenderness. She exhibits no edema and no deformity. Neurological: She is alert and oriented to person, place, and time. Gait abnormal.   Vitals reviewed.      Walks with walker      Record/Diagnostics Review:    As above, I did review the imaging    Assessment:  Lumbar radiculopathy  Tarlov cyst  Chiari malformation      Treatment Plan:  DISCUSSION: Treatment options discussed with patient and all questions answered to patient's satisfaction. OARRS Review: Reviewed and acceptable for medications prescribed. TREATMENT OPTIONS:     Discussed the importance of continued physical therapy and exercise program as well. Chart review shows she is seen a surgeon in [place] was requested a left S2 local only injection. She does have a Tarlov cyst in this this area and I did discuss the procedure as well as risks, benefits, and alternatives with her. Continue neurosurgery follow-up for possible Chiari malformation. We had discussed a possible spinal cord stimulator in the past and may consider this in the future pending testing results. Young Patel M.D. I have reviewed the chief complaint and history of present illness (including ROS and PFSH) and vital documentation by my staff and I agree with their documentation and have added where applicable.

## 2019-06-01 ENCOUNTER — OFFICE VISIT (OUTPATIENT)
Dept: FAMILY MEDICINE CLINIC | Age: 29
End: 2019-06-01
Payer: MEDICARE

## 2019-06-01 VITALS
DIASTOLIC BLOOD PRESSURE: 85 MMHG | SYSTOLIC BLOOD PRESSURE: 118 MMHG | WEIGHT: 249 LBS | HEART RATE: 93 BPM | BODY MASS INDEX: 35.65 KG/M2 | OXYGEN SATURATION: 98 % | TEMPERATURE: 98.2 F | HEIGHT: 70 IN

## 2019-06-01 DIAGNOSIS — J30.1 SEASONAL ALLERGIC RHINITIS DUE TO POLLEN: Primary | ICD-10-CM

## 2019-06-01 DIAGNOSIS — H10.13 ALLERGIC CONJUNCTIVITIS OF BOTH EYES: ICD-10-CM

## 2019-06-01 PROCEDURE — 99213 OFFICE O/P EST LOW 20 MIN: CPT | Performed by: NURSE PRACTITIONER

## 2019-06-01 PROCEDURE — G8417 CALC BMI ABV UP PARAM F/U: HCPCS | Performed by: NURSE PRACTITIONER

## 2019-06-01 PROCEDURE — 1036F TOBACCO NON-USER: CPT | Performed by: NURSE PRACTITIONER

## 2019-06-01 PROCEDURE — G8427 DOCREV CUR MEDS BY ELIG CLIN: HCPCS | Performed by: NURSE PRACTITIONER

## 2019-06-01 RX ORDER — OLOPATADINE HYDROCHLORIDE 1 MG/ML
1 SOLUTION/ DROPS OPHTHALMIC 2 TIMES DAILY
Qty: 1 BOTTLE | Refills: 0 | Status: SHIPPED | OUTPATIENT
Start: 2019-06-01 | End: 2019-07-01

## 2019-06-01 ASSESSMENT — ENCOUNTER SYMPTOMS
SHORTNESS OF BREATH: 0
EYE ITCHING: 1
SINUS PRESSURE: 0
EYE PAIN: 0
EYE REDNESS: 1
COUGH: 0
EYE DISCHARGE: 1
WHEEZING: 0

## 2019-06-01 NOTE — PROGRESS NOTES
SUBJECTIVE:    Fady Kinney is a 26 y.o. female, here for       Chief Complaint   Patient presents with    Other     Itching,watery eyes, pt would like eye drops         Prior to Visit Medications    Medication Sig Taking? Authorizing Provider   thyroid (ARMOUR) 130 MG tablet Take 1 tablet by mouth daily Yes Agata Colindres MD   loratadine (CLARITIN) 10 MG tablet Take 1 tablet by mouth daily Yes Agata Colindres MD   acetaminophen-codeine (TYLENOL/CODEINE #3) 300-30 MG per tablet Take 1 tablet by mouth 3 times daily as needed for Pain for up to 30 doses. Miguel Capps MD   naproxen (NAPROSYN) 500 MG tablet Take 1 tablet by mouth 2 times daily (with meals) Yes Agata Colindres MD   tiZANidine (ZANAFLEX) 4 MG tablet take 1 tablet by mouth three times a day Yes Agata Colindres MD   Calcium Carbonate-Vitamin D (OYSTER SHELL CALCIUM/D) 500-200 MG-UNIT TABS Take 1 tablet by mouth daily Yes Agata Colindres MD   Handicap Placard MISC by Does not apply route Expires on 10/31/19 Yes Agata Colindres MD   [facility].  Devices ([employer]) [address] [facility] Walker with wheels Yes Agata Colindres MD   diclofenac sodium 1 % GEL Apply 2 g topically 2 times daily Yes Agata Colindres MD   fluticasone (FLONASE) 50 MCG/ACT nasal spray 1 spray by Nasal route daily Yes Agata Colindres MD   albuterol sulfate  (90 BASE) MCG/ACT inhaler Inhale 2 puffs into the lungs every 6 hours as needed for Wheezing or Shortness of Breath Yes Tim See MD   Respiratory Therapy Supplies (Derrell Macias VALVED MDI CHAMBER) RICHARD To be used with inhaler Yes Tim See MD        Allergies   Allergen Reactions    Latex Hives and Swelling    Nut [Peanut-Containing Drug Products] Nausea And Vomiting        Past Medical History:   Diagnosis Date    Allergic rhinitis     Complication of anesthesia     epidural stopped working    Hypothyroidism     Neuropathy     Osteoarthritis     Thyroid disease        Past Surgical History:   Procedure Laterality Date     SECTION  4/11/10    NERVE BLOCK Bilateral 2018    bilat facet block no steroid       Social History     Socioeconomic History    Marital status:      Spouse name: Not on file    Number of children: Not on file    Years of education: Not on file    Highest education level: Not on file   Occupational History    Not on file   Social Needs    Financial resource strain: Not on file    Food insecurity:     Worry: Not on file     Inability: Not on file    Transportation needs:     Medical: Not on file     Non-medical: Not on file   Tobacco Use    Smoking status: Never Smoker    Smokeless tobacco: Never Used   Substance and Sexual Activity    Alcohol use: No    Drug use: No    Sexual activity: Not Currently     Partners: Male   Lifestyle    Physical activity:     Days per week: Not on file     Minutes per session: Not on file    Stress: Not on file   Relationships    Social connections:     Talks on phone: Not on file     Gets together: Not on file     Attends Scientology service: Not on file     Active member of club or organization: Not on file     Attends meetings of clubs or organizations: Not on file     Relationship status: Not on file    Intimate partner violence:     Fear of current or ex partner: Not on file     Emotionally abused: Not on file     Physically abused: Not on file     Forced sexual activity: Not on file   Other Topics Concern    Not on file   Social History Narrative    Not on file        Family History   Problem Relation Age of Onset    Diabetes Paternal Grandmother     Heart Failure Maternal Grandmother     Heart Failure Maternal Grandfather     Diabetes Father        Review of Systems   HENT: Positive for sneezing. Negative for sinus pressure. Eyes: Positive for discharge (watery), redness and itching. Negative for pain. Respiratory: Negative for cough, shortness of breath and wheezing. Neurological: Negative for headaches. OBJECTIVE:  Vitals:    06/01/19 1351   BP: 118/85   Pulse: 93   Temp: 98.2 °F (36.8 °C)   TempSrc: Oral   SpO2: 98%   Weight: 249 lb (112.9 kg)   Height: 5' 10\" (1.778 m)       Body mass index is 35.73 kg/m². Physical Exam   Constitutional: She is oriented to person, place, and time. She appears well-developed and well-nourished. No distress. HENT:   Right Ear: Tympanic membrane normal.   Left Ear: Tympanic membrane normal.   Mouth/Throat: Oropharynx is clear and moist. No oropharyngeal exudate. Eyes: EOM are normal. Right eye exhibits no chemosis and no discharge. Left eye exhibits no chemosis and no discharge. Right conjunctiva is injected (mildly). Left conjunctiva is not injected. Neurological: She is alert and oriented to person, place, and time. ASSESSMENT:   Diagnosis Orders   1. Seasonal allergic rhinitis due to pollen  olopatadine (PATANOL) 0.1 % ophthalmic solution   2. Allergic conjunctivitis of both eyes  olopatadine (PATANOL) 0.1 % ophthalmic solution       Requested Prescriptions     Signed Prescriptions Disp Refills    olopatadine (PATANOL) 0.1 % ophthalmic solution 1 Bottle 0     Sig: Place 1 drop into both eyes 2 times daily       PLAN:    I have discussed the patient's current concerns, clinical evaluation and plan of care with the patient today and  she is in agreement. Eye drops ordered, if not covered by insurance patient instructed to use OTC allergy drops. If symptoms are not improving or worsening, please feel free to contact your PCP or return to walk-in clinic.

## 2019-06-05 ENCOUNTER — HOSPITAL ENCOUNTER (OUTPATIENT)
Dept: MRI IMAGING | Age: 29
Discharge: HOME | End: 2019-06-07
Payer: MEDICARE

## 2019-06-05 DIAGNOSIS — G93.5 CHIARI MALFORMATION TYPE I (HCC): ICD-10-CM

## 2019-06-05 PROCEDURE — 70551 MRI BRAIN STEM W/O DYE: CPT

## 2019-06-07 ENCOUNTER — OFFICE VISIT (OUTPATIENT)
Dept: NEUROLOGY | Age: 29
End: 2019-06-07
Payer: MEDICARE

## 2019-06-07 VITALS
SYSTOLIC BLOOD PRESSURE: 125 MMHG | TEMPERATURE: 97.8 F | WEIGHT: 248.9 LBS | DIASTOLIC BLOOD PRESSURE: 81 MMHG | BODY MASS INDEX: 35.63 KG/M2 | HEART RATE: 89 BPM | HEIGHT: 70 IN

## 2019-06-07 DIAGNOSIS — R51.9 CHRONIC DAILY HEADACHE: Primary | ICD-10-CM

## 2019-06-07 PROCEDURE — 1036F TOBACCO NON-USER: CPT | Performed by: PSYCHIATRY & NEUROLOGY

## 2019-06-07 PROCEDURE — G8427 DOCREV CUR MEDS BY ELIG CLIN: HCPCS | Performed by: PSYCHIATRY & NEUROLOGY

## 2019-06-07 PROCEDURE — G8417 CALC BMI ABV UP PARAM F/U: HCPCS | Performed by: PSYCHIATRY & NEUROLOGY

## 2019-06-07 PROCEDURE — 99214 OFFICE O/P EST MOD 30 MIN: CPT | Performed by: PSYCHIATRY & NEUROLOGY

## 2019-06-07 RX ORDER — TOPIRAMATE 25 MG/1
TABLET ORAL
Qty: 180 TABLET | Refills: 1 | Status: SHIPPED | OUTPATIENT
Start: 2019-06-07 | End: 2019-09-03 | Stop reason: SINTOL
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
## 2019-06-20 ENCOUNTER — ANESTHESIA (OUTPATIENT)
Dept: OPERATING ROOM | Facility: CLINIC | Age: 29
End: 2019-06-20

## 2019-06-20 ENCOUNTER — APPOINTMENT (OUTPATIENT)
Dept: GENERAL RADIOLOGY | Age: 29
End: 2019-06-20
Attending: PAIN MEDICINE
Payer: MEDICARE

## 2019-06-20 ENCOUNTER — HOSPITAL ENCOUNTER (OUTPATIENT)
Facility: CLINIC | Age: 29
Setting detail: OUTPATIENT SURGERY
Discharge: HOME | End: 2019-06-20
Attending: PAIN MEDICINE | Admitting: PAIN MEDICINE
Payer: MEDICARE

## 2019-06-20 ENCOUNTER — ANESTHESIA EVENT (OUTPATIENT)
Dept: OPERATING ROOM | Facility: CLINIC | Age: 29
End: 2019-06-20

## 2019-06-20 VITALS
HEIGHT: 70 IN | DIASTOLIC BLOOD PRESSURE: 93 MMHG | OXYGEN SATURATION: 98 % | TEMPERATURE: 98.4 F | RESPIRATION RATE: 16 BRPM | WEIGHT: 250 LBS | HEART RATE: 93 BPM | BODY MASS INDEX: 35.79 KG/M2 | SYSTOLIC BLOOD PRESSURE: 145 MMHG

## 2019-06-20 LAB — HCG, PREGNANCY URINE (POC): NEGATIVE

## 2019-06-20 PROCEDURE — 2500000003 HC RX 250 WO HCPCS: Performed by: PAIN MEDICINE

## 2019-06-20 PROCEDURE — 7100000010 HC PHASE II RECOVERY - FIRST 15 MIN: Performed by: PAIN MEDICINE

## 2019-06-20 PROCEDURE — 3600000002 HC SURGERY LEVEL 2 BASE: Performed by: PAIN MEDICINE

## 2019-06-20 PROCEDURE — 2709999900 HC NON-CHARGEABLE SUPPLY: Performed by: PAIN MEDICINE

## 2019-06-20 PROCEDURE — 3600000012 HC SURGERY LEVEL 2 ADDTL 15MIN: Performed by: PAIN MEDICINE

## 2019-06-20 PROCEDURE — 64483 NJX AA&/STRD TFRM EPI L/S 1: CPT | Performed by: PAIN MEDICINE

## 2019-06-20 PROCEDURE — 81025 URINE PREGNANCY TEST: CPT

## 2019-06-20 PROCEDURE — 3209999900 FLUORO FOR SURGICAL PROCEDURES

## 2019-06-20 RX ORDER — BUPIVACAINE HYDROCHLORIDE 2.5 MG/ML
INJECTION, SOLUTION EPIDURAL; INFILTRATION; INTRACAUDAL PRN
Status: DISCONTINUED | OUTPATIENT
Start: 2019-06-20 | End: 2019-06-20 | Stop reason: ALTCHOICE

## 2019-06-20 ASSESSMENT — PAIN DESCRIPTION - DESCRIPTORS: DESCRIPTORS: ACHING;SHOOTING

## 2019-06-20 ASSESSMENT — PAIN - FUNCTIONAL ASSESSMENT
PAIN_FUNCTIONAL_ASSESSMENT: PREVENTS OR INTERFERES SOME ACTIVE ACTIVITIES AND ADLS
PAIN_FUNCTIONAL_ASSESSMENT: 0-10

## 2019-06-20 ASSESSMENT — PAIN SCALES - GENERAL: PAINLEVEL_OUTOF10: 4

## 2019-06-20 ASSESSMENT — PAIN DESCRIPTION - LOCATION: LOCATION: BACK

## 2019-06-20 NOTE — OP NOTE
Transforaminal Epidural  Injection:  SURGEON: Remedios Campbell    PRE-OP DIAGNOSIS: M54.17 (lumbosacral neuritis), M54.5 (low back pain)    POST-OP DIAGNOSIS: Same. PROCEDURE PERFORMED:  Left S2 Lumbar Transforaminal ARACELI selective nerve root block. Physician confirmed and marked the surgical site. ASA: 3                        MP: 2    CONSENT: Patient has undergone the educational process with this procedure, is aware and fully understands the risks involved: potential damage to any and all body organs including possible bleeding, infection, and nerve injury, allergic reaction and headache. Patient also understands that the procedure will be undertaken in a safe, controlled and monitored setting. Patient recognizes that the benefits may include relief from pain and reduction in the oral use of medications. Patient agreed to proceed. PREP: The patient was placed in the prone position and padded appropriately. The injection site was prepped with chloroprep and draped appropriately. 5ml of 0.5% lidocaine was used to anesthetize the skin and subcutaneous tissue. PROCEDURE NOTE: A 22 gauge 3.5 inch Pencil-Point needle was then advanced to the Left S2 neuroforamen using a wide paramedian approach under fluoroscopic guidance. Aspiration was negative for blood, CSF and producing pain. Contrast Medium: 1 ml of (omnipaque) contrast was then injected and the following was noted: appropriate spread of contrast, then 1.5 ml of 0.25 % bupivacaine was then injected into the nerve root sheath of each of the indicated levels. Requested no steroid by surgeon for diagnostic purposes. The needle was withdrawn by the physician and the nurse applied a sterile dressing. The patient tolerated the procedure well. No complications occured. Patient transferred to the recovery room in satisfatory condition. Appropriate written discharge instructions given to the patient.       [address] [place] [address]

## 2019-06-20 NOTE — H&P
Pain Pre-Op H&P Note    Vicky Silvio Campbell    HPI: Hillard Ganser  presents with low back and leg pain. Saw surgeon, requested S2 TF for diagnostic purposes. Past Medical History:   Diagnosis Date    Allergic rhinitis     Complication of anesthesia     epidural stopped working    Hypothyroidism     Neuropathy     Osteoarthritis     Thyroid disease        Past Surgical History:   Procedure Laterality Date     SECTION  4/11/10    NERVE BLOCK Bilateral 2018    bilat facet block no steroid       Allergies   Allergen Reactions    Latex Hives and Swelling    Nut [Peanut-Containing Drug Products] Nausea And Vomiting       No current facility-administered medications for this encounter. Social History     Tobacco Use    Smoking status: Never Smoker    Smokeless tobacco: Never Used   Substance Use Topics    Alcohol use: No       Review of Systems:   Focused review of systems was performed, and negative as pertinent to diagnosis, except as stated in HPI. Physical Exam:     /88   Pulse 100   Temp 97.5 °F (36.4 °C) (Temporal)   Resp 16   Ht 5' 10\" (1.778 m)   Wt 250 lb (113.4 kg)   SpO2 97%   BMI 35.87 kg/m²     Physical Exam   Constitutional: She is oriented to person, place, and time. Musculoskeletal:        Lumbar back: She exhibits tenderness. She exhibits no edema and no deformity. Neurological: She is alert and oriented to person, place, and time. She has normal strength. Gait normal.   Vitals reviewed. Patient's current physical status, medications, medical history, and HPI have been reviewed and updated as appropriate on this date: 19    Risk/Benefit(s): The risks, benefits, alternatives, and potential complications havebeen discussed with the patient/family and informed consent has been obtained for the procedure/sedation.     Diagnosis:   Lumbar Radiculopathy    Plan: S2 transforaminal        Bang Dux

## 2019-06-28 DIAGNOSIS — E03.8 SUBCLINICAL HYPOTHYROIDISM: ICD-10-CM

## 2019-06-28 RX ORDER — LEVOTHYROXINE, LIOTHYRONINE 76; 18 UG/1; UG/1
TABLET ORAL
Qty: 30 TABLET | Refills: 3 | Status: SHIPPED | OUTPATIENT
Start: 2019-06-28 | End: 2019-09-06

## 2019-06-28 NOTE — TELEPHONE ENCOUNTER
Please Approve or Refuse.   Send to Pharmacy per Pt's Request:      Next Visit Date:  8/28/2019   Last Visit Date: 2/28/2019    Hemoglobin A1C (%)   Date Value   02/28/2019 5.3   11/28/2017 5.3   05/01/2017 5.5             ( goal A1C is < 7)   BP Readings from Last 3 Encounters:   06/20/19 (!) 145/93   06/07/19 125/81   06/01/19 118/85          (goal 120/80)  BUN   Date Value Ref Range Status   05/31/2017 8 6 - 20 mg/dL Final     CREATININE   Date Value Ref Range Status   05/31/2017 0.84 0.50 - 0.90 mg/dL Final     Potassium   Date Value Ref Range Status   05/31/2017 4.1 3.7 - 5.3 mmol/L Final

## 2019-07-01 ENCOUNTER — OFFICE VISIT (OUTPATIENT)
Dept: NEUROSURGERY | Age: 29
End: 2019-07-01
Payer: MEDICARE

## 2019-07-01 ENCOUNTER — PATIENT MESSAGE (OUTPATIENT)
Dept: NEUROSURGERY | Age: 29
End: 2019-07-01

## 2019-07-01 VITALS — HEART RATE: 79 BPM | SYSTOLIC BLOOD PRESSURE: 129 MMHG | DIASTOLIC BLOOD PRESSURE: 81 MMHG

## 2019-07-01 DIAGNOSIS — G93.5 CHIARI MALFORMATION TYPE I (HCC): Primary | ICD-10-CM

## 2019-07-01 PROCEDURE — 1036F TOBACCO NON-USER: CPT | Performed by: NEUROLOGICAL SURGERY

## 2019-07-01 PROCEDURE — 99213 OFFICE O/P EST LOW 20 MIN: CPT | Performed by: NEUROLOGICAL SURGERY

## 2019-07-01 PROCEDURE — G8417 CALC BMI ABV UP PARAM F/U: HCPCS | Performed by: NEUROLOGICAL SURGERY

## 2019-07-01 PROCEDURE — G8427 DOCREV CUR MEDS BY ELIG CLIN: HCPCS | Performed by: NEUROLOGICAL SURGERY

## 2019-07-01 NOTE — PROGRESS NOTES
2+/4  L Triceps 2+/4; R Triceps 2+/4  L Patellar 2+/4: R Patellar 2+/4  L Achilles 2+/4; R Achilles 2+/4    hoffmans L: neg  hoffmans R: neg  Clonus L: neg  Clonus R: neg  Babinski L: up  Babinski R; up    Studies Review:     MRI cervical CSF flow study negative for any obstruction. MRI cervical sagittal shows 4.5 mm tonsillar descent below foramen magnum    Assessment and Plan:      1. Chiari malformation type I ([address]          Plan: Clinical presentation is only partially consistent with Chiari considering the onset of headaches without any specific Valsalva. In addition normal CSF flow study argues against symptomatic Chiari. Would not offer the patient surgical decompression and would recommend continued conservative management with neurologist for headaches. Patient will be due to see her [place] neurosurgeon for evaluation of surgery for the Tarlov cysts at this point. She can be seen as needed from my standpoint    Followup: No follow-ups on file. Prescriptions Ordered:  No orders of the defined types were placed in this encounter. Orders Placed:  No orders of the defined types were placed in this encounter. Electronically signed by Mary Ann Contreras DO on 7/1/2019 at 11:00 AM    Please note that this chart was generated using voice recognition Dragon dictation software. Although every effort was made to ensure the accuracy of this automated transcription, some errors in transcription may have occurred.

## 2019-07-12 ENCOUNTER — OFFICE VISIT (OUTPATIENT)
Dept: PAIN MANAGEMENT | Age: 29
End: 2019-07-12
Payer: MEDICARE

## 2019-07-12 VITALS
DIASTOLIC BLOOD PRESSURE: 86 MMHG | BODY MASS INDEX: 36.05 KG/M2 | HEART RATE: 89 BPM | HEIGHT: 70 IN | SYSTOLIC BLOOD PRESSURE: 122 MMHG | WEIGHT: 251.8 LBS

## 2019-07-12 DIAGNOSIS — G93.5 CHIARI MALFORMATION TYPE I (HCC): ICD-10-CM

## 2019-07-12 DIAGNOSIS — M54.16 LUMBAR RADICULOPATHY: Primary | ICD-10-CM

## 2019-07-12 PROCEDURE — 99213 OFFICE O/P EST LOW 20 MIN: CPT | Performed by: PAIN MEDICINE

## 2019-07-12 ASSESSMENT — ENCOUNTER SYMPTOMS
VOMITING: 0
BACK PAIN: 1
COUGH: 0

## 2019-07-12 NOTE — PROGRESS NOTES
HPI:     Back Pain   This is a chronic problem. The current episode started more than 1 year ago. The pain is present in the lumbar spine. The quality of the pain is described as aching. The pain is at a severity of 6/10. The pain is moderate. The pain is the same all the time. The symptoms are aggravated by position. Stiffness is present all day. Associated symptoms include leg pain. She has tried ice for the symptoms. The treatment provided no relief. Pain on low back and left leg. Multiple different treatment options without significant benefit. She has seen a surgeon in [place] and does have some Tarlov cysts on her MRI. Recent left S2 transforaminal with local anesthetic only with about 90% improvement for 5 to 6 hours, which is the duration of the local anesthetic. Also possible carry malformation and seeing neurosurgery for this, nothing surgical at this time continue neurology follow-up for her headaches. She continues to use a walker due to the pain. Patient denies any new neurological symptoms. Nobowel or bladder incontinence, no weakness, and no falling. Review of OARRS does not show any aberrant prescription behavior.      Past Medical History:   Diagnosis Date    Allergic rhinitis     Complication of anesthesia     epidural stopped working    Hypothyroidism     Neuropathy     Osteoarthritis     Thyroid disease        Past Surgical History:   Procedure Laterality Date     SECTION  4/11/10    EPIDURAL STEROID INJECTION Left 2019    EPIDURAL INJECTION s2 performed by Todd [facility]i [facility] at [facility] Bilateral 2018    bilat facet block no steroid    NERVE BLOCK  2019    S2 bupivacaine injection       Allergies   Allergen Reactions    Latex Hives and Swelling    Nut [Peanut-Containing Drug Products] Nausea And Vomiting         Current Outpatient Medications:     NP THYROID 120 MG tablet, take 1 tablet by mouth daily, Disp: 30 tablet, Rfl: 3    topiramate (TOPAMAX) 25 MG tablet, 1 tab (25 mg) BID for 1 week, then 2 tabs (50 mg) BID for 1 week, then 3 tabs (75 mg) BID for 1 week, then 4 tabs (100 mg ) BID thereafter, Disp: 180 tablet, Rfl: 1    loratadine (CLARITIN) 10 MG tablet, Take 1 tablet by mouth daily, Disp: 30 tablet, Rfl: 2    acetaminophen-codeine (TYLENOL/CODEINE #3) 300-30 MG per tablet, Take 1 tablet by mouth 3 times daily as needed for Pain for up to 30 doses. ., Disp: 30 tablet, Rfl: 0    naproxen (NAPROSYN) 500 MG tablet, Take 1 tablet by mouth 2 times daily (with meals), Disp: 120 tablet, Rfl: 3    tiZANidine (ZANAFLEX) 4 MG tablet, take 1 tablet by mouth three times a day, Disp: 60 tablet, Rfl: 3    Calcium Carbonate-Vitamin D (OYSTER SHELL CALCIUM/D) 500-200 MG-UNIT TABS, Take 1 tablet by mouth daily, Disp: 30 tablet, Rfl: 3    Handicap Placard MISC, by Does not apply route Expires on 10/31/19, Disp: 1 each, Rfl: 0    Misc.  Devices (ROLLER WALKER) MISC, Walker with wheels, Disp: 1 each, Rfl: 0    diclofenac sodium 1 % GEL, Apply 2 g topically 2 times daily, Disp: 1 Tube, Rfl: 3    fluticasone (FLONASE) 50 MCG/ACT nasal spray, 1 spray by Nasal route daily, Disp: 1 Bottle, Rfl: 3    albuterol sulfate  (90 BASE) MCG/ACT inhaler, Inhale 2 puffs into the lungs every 6 hours as needed for Wheezing or Shortness of Breath, Disp: 1 Inhaler, Rfl: 1    Respiratory Therapy Supplies (BREATHERITE VALVED MDI CHAMBER) RICHARD, To be used with inhaler, Disp: 1 Device, Rfl: 1    Family History   Problem Relation Age of Onset    Diabetes Paternal Grandmother     Heart Failure Maternal Grandmother     Heart Failure Maternal Grandfather     Diabetes Father        Social History     Socioeconomic History    Marital status:      Spouse name: Not on file    Number of children: Not on file    Years of education: Not on file    Highest education level: Not on file   Occupational History    Not on file   Social Needs   

## 2019-09-03 ENCOUNTER — OFFICE VISIT (OUTPATIENT)
Dept: FAMILY MEDICINE CLINIC | Age: 29
End: 2019-09-03
Payer: MEDICARE

## 2019-09-03 VITALS
SYSTOLIC BLOOD PRESSURE: 122 MMHG | OXYGEN SATURATION: 99 % | DIASTOLIC BLOOD PRESSURE: 79 MMHG | HEART RATE: 99 BPM | BODY MASS INDEX: 35.39 KG/M2 | HEIGHT: 70 IN | WEIGHT: 247.2 LBS

## 2019-09-03 DIAGNOSIS — Q07.01 CHIARI MALFORMATION TYPE II (HCC): Primary | ICD-10-CM

## 2019-09-03 DIAGNOSIS — R76.8 ANTI-RNP ANTIBODIES PRESENT: ICD-10-CM

## 2019-09-03 DIAGNOSIS — R59.0 POSTAURICULAR ADENOPATHY: ICD-10-CM

## 2019-09-03 DIAGNOSIS — R73.03 PREDIABETES: ICD-10-CM

## 2019-09-03 DIAGNOSIS — M51.26 LUMBAR DISC HERNIATION: Chronic | ICD-10-CM

## 2019-09-03 DIAGNOSIS — E03.8 SUBCLINICAL HYPOTHYROIDISM: ICD-10-CM

## 2019-09-03 DIAGNOSIS — E66.09 CLASS 2 OBESITY DUE TO EXCESS CALORIES WITHOUT SERIOUS COMORBIDITY WITH BODY MASS INDEX (BMI) OF 35.0 TO 35.9 IN ADULT: ICD-10-CM

## 2019-09-03 PROBLEM — R41.3 MEMORY DIFFICULTIES: Status: RESOLVED | Noted: 2017-05-31 | Resolved: 2019-09-03

## 2019-09-03 PROBLEM — R03.0 ELEVATED BP WITHOUT DIAGNOSIS OF HYPERTENSION: Status: RESOLVED | Noted: 2018-03-29 | Resolved: 2019-09-03

## 2019-09-03 PROCEDURE — 1036F TOBACCO NON-USER: CPT | Performed by: FAMILY MEDICINE

## 2019-09-03 PROCEDURE — G8417 CALC BMI ABV UP PARAM F/U: HCPCS | Performed by: FAMILY MEDICINE

## 2019-09-03 PROCEDURE — 99214 OFFICE O/P EST MOD 30 MIN: CPT | Performed by: FAMILY MEDICINE

## 2019-09-03 PROCEDURE — G8427 DOCREV CUR MEDS BY ELIG CLIN: HCPCS | Performed by: FAMILY MEDICINE

## 2019-09-03 ASSESSMENT — ENCOUNTER SYMPTOMS
BACK PAIN: 1
CONSTIPATION: 0
ABDOMINAL DISTENTION: 0
COUGH: 0
SORE THROAT: 0
RHINORRHEA: 0
SINUS PRESSURE: 1
ABDOMINAL PAIN: 0
PHOTOPHOBIA: 0
FACIAL SWELLING: 0
SHORTNESS OF BREATH: 0

## 2019-09-03 NOTE — PROGRESS NOTES
tablet by mouth daily 30 tablet 2    acetaminophen-codeine (TYLENOL/CODEINE #3) 300-30 MG per tablet Take 1 tablet by mouth 3 times daily as needed for Pain for up to 30 doses. . 30 tablet 0    naproxen (NAPROSYN) 500 MG tablet Take 1 tablet by mouth 2 times daily (with meals) 120 tablet 3    tiZANidine (ZANAFLEX) 4 MG tablet take 1 tablet by mouth three times a day 60 tablet 3    Handicap Placard MISC by Does not apply route Expires on 10/31/19 1 each 0    Misc. Devices (ROLLER WALKER) [address] with wheels 1 each 0    fluticasone (FLONASE) 50 MCG/ACT nasal spray 1 spray by Nasal route daily 1 Bottle 3    albuterol sulfate  (90 BASE) MCG/ACT inhaler Inhale 2 puffs into the lungs every 6 hours as needed for Wheezing or Shortness of Breath 1 Inhaler 1    Respiratory Therapy Supplies (BREATHERITE VALVED MDI CHAMBER) RICHARD To be used with inhaler 1 Device 1    Calcium Carbonate-Vitamin D (OYSTER SHELL CALCIUM/D) 500-200 MG-UNIT TABS Take 1 tablet by mouth daily (Patient not taking: Reported on 9/3/2019) 30 tablet 3     No current facility-administered medications for this visit.               Social History     Socioeconomic History    Marital status:      Spouse name: Not on file    Number of children: Not on file    Years of education: Not on file    Highest education level: Not on file   Occupational History    Not on file   Social Needs    Financial resource strain: Not on file    Food insecurity:     Worry: Not on file     Inability: Not on file    Transportation needs:     Medical: Not on file     Non-medical: Not on file   Tobacco Use    Smoking status: Never Smoker    Smokeless tobacco: Never Used   Substance and Sexual Activity    Alcohol use: No    Drug use: No    Sexual activity: Not Currently     Partners: Male   Lifestyle    Physical activity:     Days per week: Not on file     Minutes per session: Not on file    Stress: Not on file   Relationships    Social Future    6. Class 2 obesity due to excess calories without serious comorbidity with body mass index (BMI) of 35.0 to 35.9 in adult  We discussed briefly about the medications  - Lipid Panel; Future  - Comprehensive Metabolic Panel; Future  - CBC; Future    7. Postauricular adenopathy  -  Discussed with patient to start taking Claritin and Flonase daily for 1 week and if symptoms do not improve call back after 2 weeks. Orders Placed This Encounter   Procedures    TSH With Reflex Ft4     Standing Status:   Future     Standing Expiration Date:   9/3/2020    Lipid Panel     Standing Status:   Future     Standing Expiration Date:   9/3/2020     Order Specific Question:   Is Patient Fasting?/# of Hours     Answer:   12    Comprehensive Metabolic Panel     Standing Status:   Future     Standing Expiration Date:   9/3/2020    CBC     Standing Status:   Future     Standing Expiration Date:   9/3/2020   Lou Estrada MD, Rheumatology, [facility]     Referral Priority:   Routine     Referral Type:   Eval and Treat     Referral Reason:   Specialty Services Required     Referred to Provider:   Brian Hammond MD     Requested Specialty:   Rheumatology     Number of Visits Requested:   1         Medications Discontinued During This Encounter   Medication Reason    topiramate (TOPAMAX) 25 MG tablet Side effects    diclofenac sodium 1 % GEL Therapy completed       [facility] received counseling on the following healthy behaviors: nutrition, exercise and medication adherence  Reviewed prior labs and health maintenance  Continue current medications, diet and exercise. Discussed use, benefit, and side effects of prescribed medications. Barriers to medication compliance addressed. Patient given educational materials - see patient instructions  Was a self-tracking handout given in paper form or via [employer]?  Yes    Requested Prescriptions      No prescriptions requested or ordered in this encounter       All patient questions answered. Patient voiced understanding. Quality Measures    Body mass index is 35.47 kg/m². Elevated. Weight control planned discussed Healthy diet and regular exercise. BP: 122/79 Blood pressure is normal. Treatment plan consists of No treatment change needed. No results found for: LDLCALC, LDLCHOLESTEROL, LDLDIRECT (goal LDL reduction with dx if diabetes is 50% LDL reduction)      PHQ Scores 2/28/2019 10/31/2018 1/9/2018 12/27/2016   PHQ2 Score 0 0 0 0   PHQ9 Score 0 0 0 0     Interpretation of Total Score Depression Severity: 1-4 = Minimal depression, 5-9 = Mild depression, 10-14 = Moderate depression, 15-19 = Moderately severe depression, 20-27 = Severe depression    The patient'spast medical, surgical, social, and family history as well as her   current medications and allergies were reviewed as documented in today's encounter. Medications, labs, diagnostic studies, consultations andfollow-up as documented in this encounter. No follow-ups on file. Patient wasseen with total face to face time of 25 minutes. More than 50% of this visit was counseling and education. Future Appointments   Date Time Provider Westley Smith   9/13/2019  9:40 AM Esvin Tom MD [facility]     This note was completed by using the assistance of a speech-recognition program. However, inadvertent computerized transcription errors may be present. Althoughevery effort was made to ensure accuracy, no guarantees can be provided that every mistake has been identified and corrected by editing.   Electronically signed by Andria Torres MD on 9/3/2019  3:34 PM

## 2019-09-06 ENCOUNTER — HOSPITAL ENCOUNTER (OUTPATIENT)
Age: 29
Setting detail: SPECIMEN
Discharge: HOME | End: 2019-09-06
Payer: MEDICARE

## 2019-09-06 DIAGNOSIS — E03.8 SUBCLINICAL HYPOTHYROIDISM: ICD-10-CM

## 2019-09-06 DIAGNOSIS — E66.09 CLASS 2 OBESITY DUE TO EXCESS CALORIES WITHOUT SERIOUS COMORBIDITY WITH BODY MASS INDEX (BMI) OF 35.0 TO 35.9 IN ADULT: ICD-10-CM

## 2019-09-06 LAB
ALBUMIN SERPL-MCNC: 4.3 G/DL (ref 3.5–5.2)
ALBUMIN/GLOBULIN RATIO: NORMAL (ref 1–2.5)
ALP BLD-CCNC: 65 U/L (ref 35–104)
ALT SERPL-CCNC: 31 U/L (ref 5–33)
ANION GAP SERPL CALCULATED.3IONS-SCNC: 12 MMOL/L (ref 9–17)
AST SERPL-CCNC: 24 U/L
BILIRUB SERPL-MCNC: 0.72 MG/DL (ref 0.3–1.2)
BUN BLDV-MCNC: 7 MG/DL (ref 6–20)
BUN/CREAT BLD: NORMAL (ref 9–20)
CALCIUM SERPL-MCNC: 9.5 MG/DL (ref 8.6–10.4)
CHLORIDE BLD-SCNC: 104 MMOL/L (ref 98–107)
CHOLESTEROL/HDL RATIO: 4.4
CHOLESTEROL: 184 MG/DL
CO2: 24 MMOL/L (ref 20–31)
CREAT SERPL-MCNC: 0.77 MG/DL (ref 0.5–0.9)
GFR AFRICAN AMERICAN: >60 ML/MIN
GFR NON-AFRICAN AMERICAN: >60 ML/MIN
GFR SERPL CREATININE-BSD FRML MDRD: NORMAL ML/MIN/{1.73_M2}
GFR SERPL CREATININE-BSD FRML MDRD: NORMAL ML/MIN/{1.73_M2}
GLUCOSE BLD-MCNC: 98 MG/DL (ref 70–99)
HCT VFR BLD CALC: 39.6 % (ref 36–46)
HDLC SERPL-MCNC: 42 MG/DL
HEMOGLOBIN: 13.5 G/DL (ref 12–16)
LDL CHOLESTEROL: 131 MG/DL (ref 0–130)
MCH RBC QN AUTO: 29.3 PG (ref 26–34)
MCHC RBC AUTO-ENTMCNC: 34.1 G/DL (ref 31–37)
MCV RBC AUTO: 85.8 FL (ref 80–100)
NRBC AUTOMATED: NORMAL PER 100 WBC
PDW BLD-RTO: 13.5 % (ref 11.5–14.9)
PLATELET # BLD: 229 K/UL (ref 150–450)
PMV BLD AUTO: 8 FL (ref 6–12)
POTASSIUM SERPL-SCNC: 4 MMOL/L (ref 3.7–5.3)
RBC # BLD: 4.61 M/UL (ref 4–5.2)
SODIUM BLD-SCNC: 140 MMOL/L (ref 135–144)
THYROXINE, FREE: 0.93 NG/DL (ref 0.93–1.7)
TOTAL PROTEIN: 7.3 G/DL (ref 6.4–8.3)
TRIGL SERPL-MCNC: 56 MG/DL
TSH SERPL DL<=0.05 MIU/L-ACNC: 5.98 MIU/L (ref 0.3–5)
VLDLC SERPL CALC-MCNC: ABNORMAL MG/DL (ref 1–30)
WBC # BLD: 5.8 K/UL (ref 3.5–11)

## 2019-09-06 PROCEDURE — 36415 COLL VENOUS BLD VENIPUNCTURE: CPT

## 2019-09-06 PROCEDURE — 85027 COMPLETE CBC AUTOMATED: CPT

## 2019-09-06 PROCEDURE — 84439 ASSAY OF FREE THYROXINE: CPT

## 2019-09-06 PROCEDURE — 84443 ASSAY THYROID STIM HORMONE: CPT

## 2019-09-06 PROCEDURE — 80061 LIPID PANEL: CPT

## 2019-09-06 PROCEDURE — 80053 COMPREHEN METABOLIC PANEL: CPT

## 2019-09-13 ENCOUNTER — OFFICE VISIT (OUTPATIENT)
Dept: NEUROLOGY | Age: 29
End: 2019-09-13
Payer: MEDICARE

## 2019-09-13 VITALS
HEIGHT: 70 IN | BODY MASS INDEX: 35.38 KG/M2 | DIASTOLIC BLOOD PRESSURE: 84 MMHG | WEIGHT: 247.14 LBS | HEART RATE: 89 BPM | SYSTOLIC BLOOD PRESSURE: 126 MMHG | TEMPERATURE: 98.1 F

## 2019-09-13 DIAGNOSIS — G89.29 CHRONIC NONINTRACTABLE HEADACHE, UNSPECIFIED HEADACHE TYPE: ICD-10-CM

## 2019-09-13 DIAGNOSIS — R51.9 CHRONIC DAILY HEADACHE: Primary | ICD-10-CM

## 2019-09-13 DIAGNOSIS — R51.9 CHRONIC NONINTRACTABLE HEADACHE, UNSPECIFIED HEADACHE TYPE: ICD-10-CM

## 2019-09-13 PROCEDURE — 1036F TOBACCO NON-USER: CPT | Performed by: PSYCHIATRY & NEUROLOGY

## 2019-09-13 PROCEDURE — G8417 CALC BMI ABV UP PARAM F/U: HCPCS | Performed by: PSYCHIATRY & NEUROLOGY

## 2019-09-13 PROCEDURE — 99214 OFFICE O/P EST MOD 30 MIN: CPT | Performed by: PSYCHIATRY & NEUROLOGY

## 2019-09-13 PROCEDURE — G8427 DOCREV CUR MEDS BY ELIG CLIN: HCPCS | Performed by: PSYCHIATRY & NEUROLOGY
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
## 2019-09-16 ENCOUNTER — TELEPHONE (OUTPATIENT)
Dept: NEUROLOGY | Age: 29
End: 2019-09-16

## 2019-09-18 NOTE — TELEPHONE ENCOUNTER
Emgality denied. She has to have tried 3 prophylactics for at least 60 days, such as beta blocker such as metoprolol or propranolol, anticonvulsant such as depakote or gabapentin, antihypertensive such as verapamil, antidepressant such as amitriptyline or effexor. We documented to insurance she didn't tolerate Amitriptyline and the  topamax caused side effect and wasn't effective but they said she hadn't been on Topamax for 60 days, but she stopped it due to side effect. Do you want to order one more prophylactic?

## 2019-09-20 ENCOUNTER — TELEPHONE (OUTPATIENT)
Dept: NEUROLOGY | Age: 29
End: 2019-09-20

## 2019-11-18 ENCOUNTER — OFFICE VISIT (OUTPATIENT)
Dept: NEUROLOGY | Age: 29
End: 2019-11-18
Payer: MEDICARE

## 2019-11-18 VITALS
RESPIRATION RATE: 16 BRPM | OXYGEN SATURATION: 99 % | WEIGHT: 251 LBS | SYSTOLIC BLOOD PRESSURE: 131 MMHG | BODY MASS INDEX: 35.93 KG/M2 | HEART RATE: 81 BPM | TEMPERATURE: 98.2 F | HEIGHT: 70 IN | DIASTOLIC BLOOD PRESSURE: 76 MMHG

## 2019-11-18 DIAGNOSIS — G43.711 INTRACTABLE CHRONIC MIGRAINE WITHOUT AURA WITH STATUS MIGRAINOSUS: Primary | ICD-10-CM

## 2019-11-18 DIAGNOSIS — R51.9 CHRONIC DAILY HEADACHE: ICD-10-CM

## 2019-11-18 PROCEDURE — 1036F TOBACCO NON-USER: CPT | Performed by: PSYCHIATRY & NEUROLOGY

## 2019-11-18 PROCEDURE — 99214 OFFICE O/P EST MOD 30 MIN: CPT | Performed by: PSYCHIATRY & NEUROLOGY

## 2019-11-18 PROCEDURE — G8484 FLU IMMUNIZE NO ADMIN: HCPCS | Performed by: PSYCHIATRY & NEUROLOGY

## 2019-11-18 PROCEDURE — G8427 DOCREV CUR MEDS BY ELIG CLIN: HCPCS | Performed by: PSYCHIATRY & NEUROLOGY

## 2019-11-18 PROCEDURE — G8417 CALC BMI ABV UP PARAM F/U: HCPCS | Performed by: PSYCHIATRY & NEUROLOGY

## 2019-11-18 RX ORDER — SUMATRIPTAN 50 MG/1
50 TABLET, FILM COATED ORAL
Qty: 10 TABLET | Refills: 3 | Status: SHIPPED | OUTPATIENT
Start: 2019-11-18 | End: 2020-01-07 | Stop reason: SDUPTHER

## 2019-11-20 ENCOUNTER — PROCEDURE VISIT (OUTPATIENT)
Dept: NEUROLOGY | Age: 29
End: 2019-11-20
Payer: MEDICARE

## 2019-11-20 ENCOUNTER — TELEPHONE (OUTPATIENT)
Dept: NEUROLOGY | Age: 29
End: 2019-11-20

## 2019-11-20 DIAGNOSIS — G43.711 INTRACTABLE CHRONIC MIGRAINE WITHOUT AURA WITH STATUS MIGRAINOSUS: Primary | ICD-10-CM

## 2019-11-20 PROCEDURE — 64615 CHEMODENERV MUSC MIGRAINE: CPT | Performed by: PSYCHIATRY & NEUROLOGY

## 2019-11-24 DIAGNOSIS — M54.16 LUMBAR RADICULOPATHY: ICD-10-CM

## 2019-11-24 DIAGNOSIS — M51.26 LUMBAR DISC HERNIATION: ICD-10-CM

## 2019-11-25 RX ORDER — TIZANIDINE 4 MG/1
TABLET ORAL
Qty: 60 TABLET | Refills: 3 | Status: SHIPPED | OUTPATIENT
Start: 2019-11-25 | End: 2020-05-26

## 2019-12-18 DIAGNOSIS — J45.30 RAD (REACTIVE AIRWAY DISEASE), MILD PERSISTENT, UNCOMPLICATED: ICD-10-CM

## 2019-12-18 RX ORDER — ALBUTEROL SULFATE 90 UG/1
2 AEROSOL, METERED RESPIRATORY (INHALATION) EVERY 6 HOURS PRN
Qty: 1 INHALER | Refills: 1 | Status: SHIPPED | OUTPATIENT
Start: 2019-12-18

## 2020-01-07 ENCOUNTER — OFFICE VISIT (OUTPATIENT)
Dept: NEUROLOGY | Age: 30
End: 2020-01-07
Payer: MEDICARE

## 2020-01-07 VITALS
HEIGHT: 69 IN | DIASTOLIC BLOOD PRESSURE: 72 MMHG | BODY MASS INDEX: 37.33 KG/M2 | SYSTOLIC BLOOD PRESSURE: 118 MMHG | HEART RATE: 85 BPM | WEIGHT: 252 LBS

## 2020-01-07 PROCEDURE — 99214 OFFICE O/P EST MOD 30 MIN: CPT | Performed by: PSYCHIATRY & NEUROLOGY

## 2020-01-07 PROCEDURE — 1036F TOBACCO NON-USER: CPT | Performed by: PSYCHIATRY & NEUROLOGY

## 2020-01-07 PROCEDURE — G8484 FLU IMMUNIZE NO ADMIN: HCPCS | Performed by: PSYCHIATRY & NEUROLOGY

## 2020-01-07 PROCEDURE — G8427 DOCREV CUR MEDS BY ELIG CLIN: HCPCS | Performed by: PSYCHIATRY & NEUROLOGY

## 2020-01-07 PROCEDURE — G8417 CALC BMI ABV UP PARAM F/U: HCPCS | Performed by: PSYCHIATRY & NEUROLOGY

## 2020-01-07 RX ORDER — SUMATRIPTAN 100 MG/1
100 TABLET, FILM COATED ORAL
Qty: 10 TABLET | Refills: 3 | Status: SHIPPED | OUTPATIENT
Start: 2020-01-07 | End: 2021-03-16 | Stop reason: ALTCHOICE

## 2020-02-26 ENCOUNTER — PROCEDURE VISIT (OUTPATIENT)
Dept: NEUROLOGY | Age: 30
End: 2020-02-26
Payer: MEDICARE

## 2020-02-26 ENCOUNTER — TELEPHONE (OUTPATIENT)
Dept: NEUROLOGY | Age: 30
End: 2020-02-26

## 2020-02-26 PROCEDURE — 64615 CHEMODENERV MUSC MIGRAINE: CPT | Performed by: PSYCHIATRY & NEUROLOGY

## 2020-02-26 NOTE — PROGRESS NOTES
Pre  Botox BASELINE Post Botox CURRENT    Headache frequency in days/month  day/month     30   day/month   Headache intensity in visual analog scale       /10       6    /10   Headache duration in hours per episode       hours   4-10     hours     Migraine Disability Assessment Score (MIDAS)    On how many days in the last 3 months did you miss work or school because of your headaches? 8    How many days in the last 3 months was your productivity at work or school reduced by half or more because of your headaches? (Do not include days you counted in question 1 where you missed work or school.) 17    On how many days in the last 3 months did you not do household work (such as housework, home repairs and maintenance, shopping, caring for children and relatives) because of your headaches? 30    How many days in the last 3 months was your productivity in household work reduced by half of more because of your headaches? (Do not include days you counted in question 3 where you did not do household work.) 20    On how many days in the last 3 months did you miss family, social or leisure activities because of your headaches? 6    Your level of disability: 81    0 to 5 - MIDAS Grade I, Little or no disability  6 to 10 - MIDAS Grade II, Mild disability  11 to 20 - MIDAS Grade III, Moderate disability  21+ - MIDAS Grade IV, Severe disability      [facility]  Offices: Jarod Boland [address], [address] [place] [address], [facility] Dangelo Agosto, [address].  Phone: [phone]  Fax: [phone]     E. Roxianne Crest, MD Barbaraann Coulter, MD Ahmed B. Fernande Baltimore, MD Shayla Kil, MD Alton Dakins, MD Tivis Fearing, CNP      Procedure: Chemodenervation CPT Code 45677  Indication for treatment: Chronic migraine (ICD 10 M43.426)  Consent form was signed. Potential risks and benefits for the procedure were explained to the patient.

## 2020-03-13 ENCOUNTER — TELEPHONE (OUTPATIENT)
Dept: FAMILY MEDICINE CLINIC | Age: 30
End: 2020-03-13

## 2020-04-30 ENCOUNTER — HOSPITAL ENCOUNTER (OUTPATIENT)
Age: 30
Discharge: HOME | End: 2020-05-02
Payer: MEDICARE

## 2020-04-30 ENCOUNTER — HOSPITAL ENCOUNTER (OUTPATIENT)
Dept: GENERAL RADIOLOGY | Age: 30
Discharge: HOME | End: 2020-05-02
Payer: MEDICARE

## 2020-04-30 ENCOUNTER — OFFICE VISIT (OUTPATIENT)
Dept: FAMILY MEDICINE CLINIC | Age: 30
End: 2020-04-30
Payer: MEDICARE

## 2020-04-30 ENCOUNTER — HOSPITAL ENCOUNTER (OUTPATIENT)
Age: 30
Setting detail: SPECIMEN
Discharge: HOME | End: 2020-04-30
Payer: MEDICARE

## 2020-04-30 VITALS
HEART RATE: 92 BPM | HEIGHT: 69 IN | WEIGHT: 252 LBS | DIASTOLIC BLOOD PRESSURE: 76 MMHG | BODY MASS INDEX: 37.33 KG/M2 | OXYGEN SATURATION: 98 % | SYSTOLIC BLOOD PRESSURE: 122 MMHG | TEMPERATURE: 98.5 F

## 2020-04-30 PROBLEM — M46.1 SACROILIITIS, NOT ELSEWHERE CLASSIFIED (HCC): Status: ACTIVE | Noted: 2020-04-30

## 2020-04-30 LAB
-: ABNORMAL
ABSOLUTE EOS #: 0.14 K/UL (ref 0–0.44)
ABSOLUTE IMMATURE GRANULOCYTE: 0.03 K/UL (ref 0–0.3)
ABSOLUTE LYMPH #: 2.07 K/UL (ref 1.1–3.7)
ABSOLUTE MONO #: 0.48 K/UL (ref 0.1–1.2)
ALBUMIN SERPL-MCNC: 4.4 G/DL (ref 3.5–5.2)
ALBUMIN/GLOBULIN RATIO: 1.5 (ref 1–2.5)
ALP BLD-CCNC: 73 U/L (ref 35–104)
ALT SERPL-CCNC: 30 U/L (ref 5–33)
AMORPHOUS: ABNORMAL
ANION GAP SERPL CALCULATED.3IONS-SCNC: 15 MMOL/L (ref 9–17)
AST SERPL-CCNC: 22 U/L
BACTERIA: ABNORMAL
BASOPHILS # BLD: 0 % (ref 0–2)
BASOPHILS ABSOLUTE: 0.03 K/UL (ref 0–0.2)
BILIRUB SERPL-MCNC: 0.65 MG/DL (ref 0.3–1.2)
BILIRUBIN URINE: NEGATIVE
BUN BLDV-MCNC: 13 MG/DL (ref 6–20)
BUN/CREAT BLD: NORMAL (ref 9–20)
CALCIUM SERPL-MCNC: 9.3 MG/DL (ref 8.6–10.4)
CASTS UA: ABNORMAL /LPF (ref 0–2)
CHLORIDE BLD-SCNC: 101 MMOL/L (ref 98–107)
CO2: 24 MMOL/L (ref 20–31)
COLOR: YELLOW
CREAT SERPL-MCNC: 0.84 MG/DL (ref 0.5–0.9)
CRYSTALS, UA: ABNORMAL /HPF
DIFFERENTIAL TYPE: NORMAL
EOSINOPHILS RELATIVE PERCENT: 2 % (ref 1–4)
EPITHELIAL CELLS UA: ABNORMAL /HPF (ref 0–5)
GFR AFRICAN AMERICAN: >60 ML/MIN
GFR NON-AFRICAN AMERICAN: >60 ML/MIN
GFR SERPL CREATININE-BSD FRML MDRD: NORMAL ML/MIN/{1.73_M2}
GFR SERPL CREATININE-BSD FRML MDRD: NORMAL ML/MIN/{1.73_M2}
GLUCOSE BLD-MCNC: 85 MG/DL (ref 70–99)
GLUCOSE URINE: NEGATIVE
HCT VFR BLD CALC: 43.6 % (ref 36.3–47.1)
HEMOGLOBIN: 14 G/DL (ref 11.9–15.1)
IMMATURE GRANULOCYTES: 0 %
INR BLD: 0.9
KETONES, URINE: NEGATIVE
LEUKOCYTE ESTERASE, URINE: ABNORMAL
LYMPHOCYTES # BLD: 27 % (ref 24–43)
MCH RBC QN AUTO: 28.3 PG (ref 25.2–33.5)
MCHC RBC AUTO-ENTMCNC: 32.1 G/DL (ref 28.4–34.8)
MCV RBC AUTO: 88.3 FL (ref 82.6–102.9)
MONOCYTES # BLD: 6 % (ref 3–12)
MUCUS: ABNORMAL
NITRITE, URINE: NEGATIVE
NRBC AUTOMATED: 0 PER 100 WBC
OTHER OBSERVATIONS UA: ABNORMAL
PARTIAL THROMBOPLASTIN TIME: 25.6 SEC (ref 20.5–30.5)
PDW BLD-RTO: 12.1 % (ref 11.8–14.4)
PH UA: 6.5 (ref 5–8)
PLATELET # BLD: 278 K/UL (ref 138–453)
PLATELET ESTIMATE: NORMAL
PMV BLD AUTO: 10.3 FL (ref 8.1–13.5)
POTASSIUM SERPL-SCNC: 3.9 MMOL/L (ref 3.7–5.3)
PROTEIN UA: NEGATIVE
PROTHROMBIN TIME: 9.9 SEC (ref 9–12)
RBC # BLD: 4.94 M/UL (ref 3.95–5.11)
RBC # BLD: NORMAL 10*6/UL
RBC UA: ABNORMAL /HPF (ref 0–2)
RENAL EPITHELIAL, UA: ABNORMAL /HPF
SEG NEUTROPHILS: 65 % (ref 36–65)
SEGMENTED NEUTROPHILS ABSOLUTE COUNT: 5 K/UL (ref 1.5–8.1)
SODIUM BLD-SCNC: 140 MMOL/L (ref 135–144)
SPECIFIC GRAVITY UA: 1 (ref 1–1.03)
THYROXINE, FREE: 1.08 NG/DL (ref 0.93–1.7)
TOTAL PROTEIN: 7.3 G/DL (ref 6.4–8.3)
TRICHOMONAS: ABNORMAL
TSH SERPL DL<=0.05 MIU/L-ACNC: 6.23 MIU/L (ref 0.3–5)
TURBIDITY: CLEAR
URINE HGB: NEGATIVE
UROBILINOGEN, URINE: NORMAL
VITAMIN D 25-HYDROXY: 27.4 NG/ML (ref 30–100)
WBC # BLD: 7.8 K/UL (ref 3.5–11.3)
WBC # BLD: NORMAL 10*3/UL
WBC UA: ABNORMAL /HPF (ref 0–5)
YEAST: ABNORMAL

## 2020-04-30 PROCEDURE — 71046 X-RAY EXAM CHEST 2 VIEWS: CPT

## 2020-04-30 PROCEDURE — G8417 CALC BMI ABV UP PARAM F/U: HCPCS | Performed by: FAMILY MEDICINE

## 2020-04-30 PROCEDURE — 1036F TOBACCO NON-USER: CPT | Performed by: FAMILY MEDICINE

## 2020-04-30 PROCEDURE — 99214 OFFICE O/P EST MOD 30 MIN: CPT | Performed by: FAMILY MEDICINE

## 2020-04-30 PROCEDURE — G8427 DOCREV CUR MEDS BY ELIG CLIN: HCPCS | Performed by: FAMILY MEDICINE

## 2020-04-30 ASSESSMENT — ENCOUNTER SYMPTOMS
ABDOMINAL DISTENTION: 0
CONSTIPATION: 0
BLOOD IN STOOL: 0
EYE REDNESS: 0
NAUSEA: 0
ANAL BLEEDING: 0
COUGH: 0
PHOTOPHOBIA: 0
BACK PAIN: 1
VOMITING: 0
COLOR CHANGE: 0
RHINORRHEA: 0
SINUS PRESSURE: 0
WHEEZING: 0
APNEA: 0
ABDOMINAL PAIN: 0
SHORTNESS OF BREATH: 0

## 2020-04-30 ASSESSMENT — PATIENT HEALTH QUESTIONNAIRE - PHQ9
SUM OF ALL RESPONSES TO PHQ9 QUESTIONS 1 & 2: 0
SUM OF ALL RESPONSES TO PHQ QUESTIONS 1-9: 0
1. LITTLE INTEREST OR PLEASURE IN DOING THINGS: 0
2. FEELING DOWN, DEPRESSED OR HOPELESS: 0
SUM OF ALL RESPONSES TO PHQ QUESTIONS 1-9: 0

## 2020-04-30 NOTE — PROGRESS NOTES
Chief Complaint   Patient presents with    Forms     surgical clearance          [facility]  here today for follow up on chronic medical problems, go over labs and/or diagnostic studies, and medication refills. Forms (surgical clearance )      HPI: Patient is here for surgery clearance, patient has lumbar spinal cyst is planning to have surgery in [place]. Patient reports she needs clearance. Surgery is planned on 20 May, patient has history of asthma which is stable on albuterol inhaler. No recent exacerbations. Patient is able to walk without any short of breath. Labs ordered. /76   Pulse 92   Temp 98.5 °F (36.9 °C) (Tympanic)   Ht 5' 9\" (1.753 m)   Wt 252 lb (114.3 kg)   LMP 04/23/2020 (Exact Date)   SpO2 98%   BMI 37.21 kg/m²    Body mass index is 37.21 kg/m². Wt Readings from Last 3 Encounters:   04/30/20 252 lb (114.3 kg)   01/07/20 252 lb (114.3 kg)   11/18/19 251 lb (113.9 kg)        [x]Negative depression screening. PHQ Scores 4/30/2020 2/28/2019 10/31/2018 1/9/2018 12/27/2016   PHQ2 Score 0 0 0 0 0   PHQ9 Score 0 0 0 0 0      []1-4 = Minimal depression   []5-9 = Milddepression   []10-14 = Moderate depression   []15-19 = Moderately severe depression   []20-27 = Severe depression    Discussed testing with the patient and all questions fully answered.     Hospital Outpatient Visit on 09/06/2019   Component Date Value Ref Range Status    WBC 09/06/2019 5.8  3.5 - 11.0 k/uL Final    RBC 09/06/2019 4.61  4.0 - 5.2 m/uL Final    Hemoglobin 09/06/2019 13.5  12.0 - 16.0 g/dL Final    Hematocrit 09/06/2019 39.6  36 - 46 % Final    MCV 09/06/2019 85.8  80 - 100 fL Final    MCH 09/06/2019 29.3  26 - 34 pg Final    MCHC 09/06/2019 34.1  31 - 37 g/dL Final    RDW 09/06/2019 13.5  11.5 - 14.9 % Final    Platelets 49/06/4508 229  150 - 450 k/uL Final    MPV 09/06/2019 8.0  6.0 - 12.0 fL Final    NRBC Automated 09/06/2019 NOT REPORTED  per 100 WBC Final    Glucose 09/06/2019 98  70 - 99 mg/dL Final    BUN 09/06/2019 7  6 - 20 mg/dL Final    CREATININE 09/06/2019 0.77  0.50 - 0.90 mg/dL Final    Bun/Cre Ratio 09/06/2019 NOT REPORTED  9 - 20 Final    Calcium 09/06/2019 9.5  8.6 - 10.4 mg/dL Final    Sodium 09/06/2019 140  135 - 144 mmol/L Final    Potassium 09/06/2019 4.0  3.7 - 5.3 mmol/L Final    Chloride 09/06/2019 104  98 - 107 mmol/L Final    CO2 09/06/2019 24  20 - 31 mmol/L Final    Anion Gap 09/06/2019 12  9 - 17 mmol/L Final    Alkaline Phosphatase 09/06/2019 65  35 - 104 U/L Final    ALT 09/06/2019 31  5 - 33 U/L Final    AST 09/06/2019 24  <32 U/L Final    Total Bilirubin 09/06/2019 0.72  0.3 - 1.2 mg/dL Final    Total Protein 09/06/2019 7.3  6.4 - 8.3 g/dL Final    Alb 09/06/2019 4.3  3.5 - 5.2 g/dL Final    Albumin/Globulin Ratio 09/06/2019 NOT REPORTED  1.0 - 2.5 Final    GFR Non- 09/06/2019 >60  >60 mL/min Final    GFR  09/06/2019 >60  >60 mL/min Final    GFR Comment 09/06/2019        Final    Comment: Average GFR for 30-36 years old:   80 mL/min/1.73sq m  Chronic Kidney Disease:   <60 mL/min/1.73sq m  Kidney failure:   <15 mL/min/1.73sq m              eGFR calculated using average adult body mass. Additional eGFR calculator available at:        [email or web address]            GFR Staging 09/06/2019 NOT REPORTED   Final    Cholesterol 09/06/2019 184  <200 mg/dL Final    Comment:    Cholesterol Guidelines:      <200  Desirable   200-240  Borderline      >240  Undesirable         HDL 09/06/2019 42  >40 mg/dL Final    Comment:    HDL Guidelines:    <40     Undesirable   40-59    Borderline    >59     Desirable         LDL Cholesterol 09/06/2019 131* 0 - 130 mg/dL Final    Comment:    LDL Guidelines:     <100    Desirable   100-129   Near to/above Desirable   130-159   Borderline      >159   Undesirable     Direct (measured) LDL and calculated LDL are not interchangeable tests.       Chol/HDL (ZANAFLEX) 4 MG tablet take 1 tablet by mouth three times a day 60 tablet 3    thyroid (ARMOUR) 130 MG tablet Take 1 tablet by mouth daily 90 tablet 3    loratadine (CLARITIN) 10 MG tablet Take 1 tablet by mouth daily 30 tablet 2    naproxen (NAPROSYN) 500 MG tablet Take 1 tablet by mouth 2 times daily (with meals) 120 tablet 3    Calcium Carbonate-Vitamin D (OYSTER SHELL CALCIUM/D) 500-200 MG-UNIT TABS Take 1 tablet by mouth daily 30 tablet 3    Misc. Devices (ROLLER WALKER) MISC Walker with wheels 1 each 0    fluticasone (FLONASE) 50 MCG/ACT nasal spray 1 spray by Nasal route daily 1 Bottle 3    Respiratory Therapy Supplies (BREATHERITE VALVED MDI CHAMBER) RICHARD To be used with inhaler 1 Device 1    SUMAtriptan (IMITREX) 100 MG tablet Take 1 tablet by mouth once as needed for Migraine 10 tablet 3     No current facility-administered medications for this visit.               Social History     Socioeconomic History    Marital status:      Spouse name: Not on file    Number of children: Not on file    Years of education: Not on file    Highest education level: Not on file   Occupational History    Not on file   Social Needs    Financial resource strain: Not on file    Food insecurity     Worry: Not on file     Inability: Not on file    Transportation needs     Medical: Not on file     Non-medical: Not on file   Tobacco Use    Smoking status: Never Smoker    Smokeless tobacco: Never Used   Substance and Sexual Activity    Alcohol use: No    Drug use: No    Sexual activity: Not Currently     Partners: Male   Lifestyle    Physical activity     Days per week: Not on file     Minutes per session: Not on file    Stress: Not on file   Relationships    Social connections     Talks on phone: Not on file     Gets together: Not on file     Attends Zoroastrian service: Not on file     Active member of club or organization: Not on file     Attends meetings of clubs or organizations: Not on file

## 2020-04-30 NOTE — PROGRESS NOTES
Visit Information    Have you changed or started any medications since your last visit including any over-the-counter medicines, vitamins, or herbal medicines? no   Are you having any side effects from any of your medications? -  no  Have you stopped taking any of your medications? Is so, why? -  no    Have you seen any other physician or provider since your last visit? No  Have you had any other diagnostic tests since your last visit? No  Have you been seen in the emergency room and/or had an admission to a hospital since we last saw you? No  Have you had your routine dental cleaning in the past 6 months? no    Have you activated your [employer] account? If not, what are your barriers?  Yes     Patient Care Team:  Jackie Salinas MD as PCP - General (Family Medicine)  Jackie Salinas MD as PCP - [address] [place] [address] Dr Wells Provider    Medical History Review  Past Medical, Family, and Social History reviewed and does contribute to the patient presenting condition    Health Maintenance   Topic Date Due    Varicella vaccine (1 of 2 - 2-dose childhood series) 04/04/1990    Cervical cancer screen  04/04/2010    A1C test (Diabetic or Prediabetic)  02/28/2020    Flu vaccine (Season Ended) 09/01/2020    TSH testing  09/06/2020    DTaP/Tdap/Td vaccine (2 - Td) 12/27/2026    HIV screen  Completed    Hepatitis A vaccine  Aged Out    Hepatitis B vaccine  Aged Out    Hib vaccine  Aged Out    Meningococcal (ACWY) vaccine  Aged Out    Pneumococcal 0-64 years Vaccine  Aged Out

## 2020-05-01 ENCOUNTER — HOSPITAL ENCOUNTER (OUTPATIENT)
Age: 30
Discharge: HOME | End: 2020-05-01
Payer: MEDICARE

## 2020-05-01 LAB
EKG ATRIAL RATE: 84 BPM
EKG P AXIS: 58 DEGREES
EKG P-R INTERVAL: 124 MS
EKG Q-T INTERVAL: 362 MS
EKG QRS DURATION: 84 MS
EKG QTC CALCULATION (BAZETT): 427 MS
EKG R AXIS: 73 DEGREES
EKG T AXIS: 49 DEGREES
EKG VENTRICULAR RATE: 84 BPM

## 2020-05-01 PROCEDURE — 93010 ELECTROCARDIOGRAM REPORT: CPT | Performed by: INTERNAL MEDICINE

## 2020-05-01 PROCEDURE — 93005 ELECTROCARDIOGRAM TRACING: CPT

## 2020-05-05 ENCOUNTER — TELEPHONE (OUTPATIENT)
Dept: ADMINISTRATIVE | Age: 30
End: 2020-05-05

## 2020-05-05 RX ORDER — CEPHALEXIN 500 MG/1
500 CAPSULE ORAL 2 TIMES DAILY
Qty: 20 CAPSULE | Refills: 0 | Status: ON HOLD | OUTPATIENT
Start: 2020-05-05 | End: 2020-06-25 | Stop reason: ALTCHOICE

## 2020-05-05 NOTE — TELEPHONE ENCOUNTER
Pt called asking for antibiotic for UTI be called into [facility] on [facility].  Please advise

## 2020-05-05 NOTE — TELEPHONE ENCOUNTER
I ordered urine culture for her , she does not have clear uti ,if cant do URINE CULTURE , then I will treat her prophylactically, as she is going for surgery.  I will send antibiotic to pharmacy

## 2020-05-06 ENCOUNTER — HOSPITAL ENCOUNTER (OUTPATIENT)
Age: 30
Setting detail: SPECIMEN
Discharge: HOME | End: 2020-05-06
Payer: MEDICARE

## 2020-05-07 LAB
CULTURE: NORMAL
Lab: NORMAL
SPECIMEN DESCRIPTION: NORMAL

## 2020-05-26 RX ORDER — TIZANIDINE 4 MG/1
TABLET ORAL
Qty: 60 TABLET | Refills: 3 | Status: SHIPPED | OUTPATIENT
Start: 2020-05-26 | End: 2021-02-18 | Stop reason: SDUPTHER

## 2020-06-08 ENCOUNTER — TELEPHONE (OUTPATIENT)
Dept: NEUROLOGY | Age: 30
End: 2020-06-08

## 2020-06-23 ENCOUNTER — TELEPHONE (OUTPATIENT)
Dept: FAMILY MEDICINE CLINIC | Age: 30
End: 2020-06-23

## 2020-06-23 NOTE — TELEPHONE ENCOUNTER
SPOKE WITH PATIENT REGARDING BLOOD PRESSURE READINGS AND SYMPTOMS, SHE STATED SHE HAS BEEN FEELING A LITTLE BETTER THE PAST HOUR AND HASN'T NOTICED ANY LOW READINGS SINCE BUT SHE WILL CONTINUE TO MONITOR AND IF IT DOES CONTINUE SHE WILL GO TO ER

## 2020-06-24 RX ORDER — AMITRIPTYLINE HYDROCHLORIDE 100 MG/1
100 TABLET, FILM COATED ORAL NIGHTLY
Qty: 30 TABLET | Refills: 3 | Status: SHIPPED | OUTPATIENT
Start: 2020-06-24 | End: 2021-03-16 | Stop reason: ALTCHOICE

## 2020-06-25 ENCOUNTER — APPOINTMENT (OUTPATIENT)
Dept: CT IMAGING | Age: 30
End: 2020-06-25
Payer: MEDICARE

## 2020-06-25 ENCOUNTER — APPOINTMENT (OUTPATIENT)
Dept: GENERAL RADIOLOGY | Age: 30
End: 2020-06-25
Payer: MEDICARE

## 2020-06-25 ENCOUNTER — HOSPITAL ENCOUNTER (OUTPATIENT)
Age: 30
Setting detail: OBSERVATION
Discharge: HOME | End: 2020-06-26
Attending: EMERGENCY MEDICINE | Admitting: EMERGENCY MEDICINE
Payer: MEDICARE

## 2020-06-25 PROBLEM — R42 DIZZINESS: Status: ACTIVE | Noted: 2020-06-25

## 2020-06-25 LAB
ABSOLUTE EOS #: 0.19 K/UL (ref 0–0.44)
ABSOLUTE IMMATURE GRANULOCYTE: <0.03 K/UL (ref 0–0.3)
ABSOLUTE LYMPH #: 1.89 K/UL (ref 1.1–3.7)
ABSOLUTE MONO #: 0.43 K/UL (ref 0.1–1.2)
ANION GAP SERPL CALCULATED.3IONS-SCNC: 15 MMOL/L (ref 9–17)
BASOPHILS # BLD: 0 % (ref 0–2)
BASOPHILS ABSOLUTE: <0.03 K/UL (ref 0–0.2)
BUN BLDV-MCNC: 10 MG/DL (ref 6–20)
BUN/CREAT BLD: ABNORMAL (ref 9–20)
CALCIUM SERPL-MCNC: 9.6 MG/DL (ref 8.6–10.4)
CHLORIDE BLD-SCNC: 103 MMOL/L (ref 98–107)
CO2: 21 MMOL/L (ref 20–31)
CREAT SERPL-MCNC: 0.79 MG/DL (ref 0.5–0.9)
D-DIMER QUANTITATIVE: 2.29 MG/L FEU
DIFFERENTIAL TYPE: NORMAL
EOSINOPHILS RELATIVE PERCENT: 4 % (ref 1–4)
GFR AFRICAN AMERICAN: >60 ML/MIN
GFR NON-AFRICAN AMERICAN: >60 ML/MIN
GFR SERPL CREATININE-BSD FRML MDRD: ABNORMAL ML/MIN/{1.73_M2}
GFR SERPL CREATININE-BSD FRML MDRD: ABNORMAL ML/MIN/{1.73_M2}
GLUCOSE BLD-MCNC: 122 MG/DL (ref 70–99)
HCG QUALITATIVE: NEGATIVE
HCT VFR BLD CALC: 42.1 % (ref 36.3–47.1)
HEMOGLOBIN: 13.3 G/DL (ref 11.9–15.1)
IMMATURE GRANULOCYTES: 0 %
LYMPHOCYTES # BLD: 38 % (ref 24–43)
MCH RBC QN AUTO: 28.6 PG (ref 25.2–33.5)
MCHC RBC AUTO-ENTMCNC: 31.6 G/DL (ref 28.4–34.8)
MCV RBC AUTO: 90.5 FL (ref 82.6–102.9)
MONOCYTES # BLD: 9 % (ref 3–12)
NRBC AUTOMATED: 0 PER 100 WBC
PDW BLD-RTO: 13.2 % (ref 11.8–14.4)
PLATELET # BLD: 177 K/UL (ref 138–453)
PLATELET ESTIMATE: NORMAL
PMV BLD AUTO: 9.5 FL (ref 8.1–13.5)
POTASSIUM SERPL-SCNC: 3.6 MMOL/L (ref 3.7–5.3)
RBC # BLD: 4.65 M/UL (ref 3.95–5.11)
RBC # BLD: NORMAL 10*6/UL
SEG NEUTROPHILS: 49 % (ref 36–65)
SEGMENTED NEUTROPHILS ABSOLUTE COUNT: 2.39 K/UL (ref 1.5–8.1)
SODIUM BLD-SCNC: 139 MMOL/L (ref 135–144)
TROPONIN INTERP: NORMAL
TROPONIN T: NORMAL NG/ML
TROPONIN, HIGH SENSITIVITY: 10 NG/L (ref 0–14)
WBC # BLD: 4.9 K/UL (ref 3.5–11.3)
WBC # BLD: NORMAL 10*3/UL

## 2020-06-25 PROCEDURE — G0378 HOSPITAL OBSERVATION PER HR: HCPCS

## 2020-06-25 PROCEDURE — 71045 X-RAY EXAM CHEST 1 VIEW: CPT

## 2020-06-25 PROCEDURE — 96375 TX/PRO/DX INJ NEW DRUG ADDON: CPT

## 2020-06-25 PROCEDURE — 96374 THER/PROPH/DIAG INJ IV PUSH: CPT

## 2020-06-25 PROCEDURE — 85025 COMPLETE CBC W/AUTO DIFF WBC: CPT

## 2020-06-25 PROCEDURE — 2580000003 HC RX 258: Performed by: STUDENT IN AN ORGANIZED HEALTH CARE EDUCATION/TRAINING PROGRAM

## 2020-06-25 PROCEDURE — 6360000004 HC RX CONTRAST MEDICATION

## 2020-06-25 PROCEDURE — 84484 ASSAY OF TROPONIN QUANT: CPT

## 2020-06-25 PROCEDURE — 93005 ELECTROCARDIOGRAM TRACING: CPT | Performed by: STUDENT IN AN ORGANIZED HEALTH CARE EDUCATION/TRAINING PROGRAM

## 2020-06-25 PROCEDURE — 70450 CT HEAD/BRAIN W/O DYE: CPT

## 2020-06-25 PROCEDURE — 99285 EMERGENCY DEPT VISIT HI MDM: CPT

## 2020-06-25 PROCEDURE — 84703 CHORIONIC GONADOTROPIN ASSAY: CPT

## 2020-06-25 PROCEDURE — 6370000000 HC RX 637 (ALT 250 FOR IP): Performed by: STUDENT IN AN ORGANIZED HEALTH CARE EDUCATION/TRAINING PROGRAM

## 2020-06-25 PROCEDURE — 6360000002 HC RX W HCPCS: Performed by: STUDENT IN AN ORGANIZED HEALTH CARE EDUCATION/TRAINING PROGRAM

## 2020-06-25 PROCEDURE — 85379 FIBRIN DEGRADATION QUANT: CPT

## 2020-06-25 PROCEDURE — 80048 BASIC METABOLIC PNL TOTAL CA: CPT

## 2020-06-25 PROCEDURE — 99243 OFF/OP CNSLTJ NEW/EST LOW 30: CPT | Performed by: NEUROLOGICAL SURGERY

## 2020-06-25 PROCEDURE — 71260 CT THORAX DX C+: CPT

## 2020-06-25 RX ORDER — PROCHLORPERAZINE EDISYLATE 5 MG/ML
10 INJECTION INTRAMUSCULAR; INTRAVENOUS ONCE
Status: COMPLETED | OUTPATIENT
Start: 2020-06-25 | End: 2020-06-25

## 2020-06-25 RX ORDER — TIZANIDINE 4 MG/1
4 TABLET ORAL ONCE
Status: COMPLETED | OUTPATIENT
Start: 2020-06-25 | End: 2020-06-25

## 2020-06-25 RX ORDER — SODIUM CHLORIDE 0.9 % (FLUSH) 0.9 %
10 SYRINGE (ML) INJECTION EVERY 12 HOURS SCHEDULED
Status: DISCONTINUED | OUTPATIENT
Start: 2020-06-25 | End: 2020-06-26 | Stop reason: HOSPADM

## 2020-06-25 RX ORDER — SODIUM CHLORIDE 0.9 % (FLUSH) 0.9 %
10 SYRINGE (ML) INJECTION PRN
Status: DISCONTINUED | OUTPATIENT
Start: 2020-06-25 | End: 2020-06-26 | Stop reason: HOSPADM

## 2020-06-25 RX ORDER — NAPROXEN 250 MG/1
500 TABLET ORAL 2 TIMES DAILY WITH MEALS
Status: DISCONTINUED | OUTPATIENT
Start: 2020-06-25 | End: 2020-06-26 | Stop reason: HOSPADM

## 2020-06-25 RX ORDER — FLUTICASONE PROPIONATE 50 MCG
1 SPRAY, SUSPENSION (ML) NASAL DAILY
Status: DISCONTINUED | OUTPATIENT
Start: 2020-06-25 | End: 2020-06-26 | Stop reason: HOSPADM

## 2020-06-25 RX ORDER — SUMATRIPTAN 50 MG/1
100 TABLET, FILM COATED ORAL
Status: DISPENSED | OUTPATIENT
Start: 2020-06-25 | End: 2020-06-25

## 2020-06-25 RX ORDER — CEPHALEXIN 500 MG/1
500 CAPSULE ORAL 2 TIMES DAILY
Status: DISCONTINUED | OUTPATIENT
Start: 2020-06-25 | End: 2020-06-26 | Stop reason: HOSPADM

## 2020-06-25 RX ORDER — CETIRIZINE HYDROCHLORIDE 10 MG/1
10 TABLET ORAL DAILY
Status: DISCONTINUED | OUTPATIENT
Start: 2020-06-25 | End: 2020-06-26 | Stop reason: HOSPADM

## 2020-06-25 RX ORDER — ONDANSETRON 2 MG/ML
4 INJECTION INTRAMUSCULAR; INTRAVENOUS ONCE
Status: COMPLETED | OUTPATIENT
Start: 2020-06-25 | End: 2020-06-25

## 2020-06-25 RX ORDER — 0.9 % SODIUM CHLORIDE 0.9 %
500 INTRAVENOUS SOLUTION INTRAVENOUS ONCE
Status: COMPLETED | OUTPATIENT
Start: 2020-06-25 | End: 2020-06-25

## 2020-06-25 RX ORDER — AMITRIPTYLINE HYDROCHLORIDE 50 MG/1
100 TABLET, FILM COATED ORAL NIGHTLY
Status: DISCONTINUED | OUTPATIENT
Start: 2020-06-25 | End: 2020-06-26 | Stop reason: HOSPADM

## 2020-06-25 RX ORDER — TIZANIDINE 4 MG/1
4 TABLET ORAL 3 TIMES DAILY
Status: DISCONTINUED | OUTPATIENT
Start: 2020-06-25 | End: 2020-06-26 | Stop reason: HOSPADM

## 2020-06-25 RX ORDER — 0.9 % SODIUM CHLORIDE 0.9 %
1000 INTRAVENOUS SOLUTION INTRAVENOUS ONCE
Status: COMPLETED | OUTPATIENT
Start: 2020-06-25 | End: 2020-06-26

## 2020-06-25 RX ORDER — ACETAMINOPHEN 325 MG/1
650 TABLET ORAL EVERY 4 HOURS PRN
Status: DISCONTINUED | OUTPATIENT
Start: 2020-06-25 | End: 2020-06-26 | Stop reason: HOSPADM

## 2020-06-25 RX ORDER — OXYCODONE HYDROCHLORIDE AND ACETAMINOPHEN 5; 325 MG/1; MG/1
1 TABLET ORAL ONCE
Status: COMPLETED | OUTPATIENT
Start: 2020-06-25 | End: 2020-06-25

## 2020-06-25 RX ORDER — ALBUTEROL SULFATE 90 UG/1
2 AEROSOL, METERED RESPIRATORY (INHALATION) EVERY 6 HOURS PRN
Status: DISCONTINUED | OUTPATIENT
Start: 2020-06-25 | End: 2020-06-26 | Stop reason: HOSPADM

## 2020-06-25 RX ORDER — DIPHENHYDRAMINE HYDROCHLORIDE 50 MG/ML
25 INJECTION INTRAMUSCULAR; INTRAVENOUS ONCE
Status: COMPLETED | OUTPATIENT
Start: 2020-06-25 | End: 2020-06-25

## 2020-06-25 RX ADMIN — NAPROXEN 500 MG: 250 TABLET ORAL at 19:01

## 2020-06-25 RX ADMIN — TIZANIDINE 4 MG: 4 TABLET ORAL at 21:07

## 2020-06-25 RX ADMIN — THYROID, PORCINE 135 MG: 60 TABLET ORAL at 19:01

## 2020-06-25 RX ADMIN — PROCHLORPERAZINE EDISYLATE 10 MG: 5 INJECTION INTRAMUSCULAR; INTRAVENOUS at 11:52

## 2020-06-25 RX ADMIN — SODIUM CHLORIDE 1000 ML: 9 INJECTION, SOLUTION INTRAVENOUS at 16:56

## 2020-06-25 RX ADMIN — IOHEXOL 75 ML: 350 INJECTION, SOLUTION INTRAVENOUS at 14:12

## 2020-06-25 RX ADMIN — OXYCODONE HYDROCHLORIDE AND ACETAMINOPHEN 1 TABLET: 5; 325 TABLET ORAL at 16:45

## 2020-06-25 RX ADMIN — AMITRIPTYLINE HYDROCHLORIDE 100 MG: 50 TABLET, FILM COATED ORAL at 21:07

## 2020-06-25 RX ADMIN — SODIUM CHLORIDE 500 ML: 9 INJECTION, SOLUTION INTRAVENOUS at 11:52

## 2020-06-25 RX ADMIN — DIPHENHYDRAMINE HYDROCHLORIDE 25 MG: 50 INJECTION, SOLUTION INTRAMUSCULAR; INTRAVENOUS at 11:52

## 2020-06-25 RX ADMIN — TIZANIDINE 4 MG: 4 TABLET ORAL at 16:53

## 2020-06-25 RX ADMIN — ONDANSETRON 4 MG: 2 INJECTION INTRAMUSCULAR; INTRAVENOUS at 16:45

## 2020-06-25 ASSESSMENT — PAIN SCALES - GENERAL
PAINLEVEL_OUTOF10: 8
PAINLEVEL_OUTOF10: 7
PAINLEVEL_OUTOF10: 5

## 2020-06-25 ASSESSMENT — PAIN DESCRIPTION - PAIN TYPE
TYPE: ACUTE PAIN
TYPE: ACUTE PAIN

## 2020-06-25 ASSESSMENT — ENCOUNTER SYMPTOMS
SHORTNESS OF BREATH: 1
VOMITING: 0
CHEST TIGHTNESS: 1
ABDOMINAL PAIN: 0
NAUSEA: 1
BLOOD IN STOOL: 0
BACK PAIN: 0
PHOTOPHOBIA: 1
FACIAL SWELLING: 0

## 2020-06-25 ASSESSMENT — PAIN DESCRIPTION - LOCATION
LOCATION: BACK
LOCATION: HEAD

## 2020-06-25 ASSESSMENT — PAIN DESCRIPTION - FREQUENCY: FREQUENCY: CONTINUOUS

## 2020-06-25 ASSESSMENT — PAIN DESCRIPTION - ONSET: ONSET: GRADUAL

## 2020-06-25 ASSESSMENT — PAIN DESCRIPTION - ORIENTATION: ORIENTATION: LOWER

## 2020-06-25 ASSESSMENT — PAIN DESCRIPTION - DESCRIPTORS: DESCRIPTORS: ACHING

## 2020-06-25 ASSESSMENT — PAIN DESCRIPTION - PROGRESSION: CLINICAL_PROGRESSION: GRADUALLY WORSENING

## 2020-06-25 NOTE — ED PROVIDER NOTES
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 It was a suboptimal study.   At this point given neurosurgery is likely desire for further imaging and continue concerned about labile vital signs we will admit her to hobs, cards and neurosurgery to see      (Please note that portions of this note were completed with a voice recognition program.  Efforts were made to edit the dictations but occasionally words are mis-transcribed.)      Yates MD  Attending Emergency Physician          Maggie Wahl MD  06/25/20 [id number]

## 2020-06-26 VITALS
HEIGHT: 69 IN | OXYGEN SATURATION: 96 % | TEMPERATURE: 97.2 F | BODY MASS INDEX: 37.18 KG/M2 | SYSTOLIC BLOOD PRESSURE: 108 MMHG | RESPIRATION RATE: 18 BRPM | DIASTOLIC BLOOD PRESSURE: 58 MMHG | HEART RATE: 88 BPM | WEIGHT: 251 LBS

## 2020-06-26 LAB
LV EF: 55 %
LVEF MODALITY: NORMAL
TROPONIN INTERP: NORMAL
TROPONIN T: NORMAL NG/ML
TROPONIN, HIGH SENSITIVITY: 11 NG/L (ref 0–14)

## 2020-06-26 PROCEDURE — 84484 ASSAY OF TROPONIN QUANT: CPT

## 2020-06-26 PROCEDURE — 2580000003 HC RX 258: Performed by: STUDENT IN AN ORGANIZED HEALTH CARE EDUCATION/TRAINING PROGRAM

## 2020-06-26 PROCEDURE — 36415 COLL VENOUS BLD VENIPUNCTURE: CPT

## 2020-06-26 PROCEDURE — 6370000000 HC RX 637 (ALT 250 FOR IP): Performed by: STUDENT IN AN ORGANIZED HEALTH CARE EDUCATION/TRAINING PROGRAM

## 2020-06-26 PROCEDURE — 93660 TILT TABLE EVALUATION: CPT | Performed by: INTERNAL MEDICINE

## 2020-06-26 PROCEDURE — G0378 HOSPITAL OBSERVATION PER HR: HCPCS

## 2020-06-26 PROCEDURE — 93306 TTE W/DOPPLER COMPLETE: CPT

## 2020-06-26 PROCEDURE — 99219 PR INITIAL OBSERVATION CARE/DAY 50 MINUTES: CPT | Performed by: NEUROLOGICAL SURGERY

## 2020-06-26 RX ADMIN — TIZANIDINE 4 MG: 4 TABLET ORAL at 10:27

## 2020-06-26 RX ADMIN — SODIUM CHLORIDE, PRESERVATIVE FREE 10 ML: 5 INJECTION INTRAVENOUS at 11:00

## 2020-06-26 RX ADMIN — TIZANIDINE 4 MG: 4 TABLET ORAL at 14:48

## 2020-06-26 RX ADMIN — THYROID, PORCINE 135 MG: 60 TABLET ORAL at 14:49

## 2020-06-26 RX ADMIN — Medication 1 TABLET: at 14:48

## 2020-06-26 ASSESSMENT — PAIN SCALES - GENERAL
PAINLEVEL_OUTOF10: 7
PAINLEVEL_OUTOF10: 0
PAINLEVEL_OUTOF10: 0

## 2020-06-26 NOTE — DISCHARGE SUMMARY
tablet by mouth daily             tiZANidine (ZANAFLEX) 4 MG tablet  take 1 tablet by mouth three times a day                 Diet:  DIET GENERAL; , Advance as tolerated     Activity:  As tolerated    Consultants: IP CONSULT TO NEUROSURGERY  IP CONSULT TO NEUROSURGERY  IP CONSULT TO CARDIOLOGY  IP CONSULT TO HOME CARE NEEDS    Procedures:  Not indicated     Diagnostic Test:   Results for orders placed or performed during the hospital encounter of 06/25/20   CBC Auto Differential   Result Value Ref Range    WBC 4.9 3.5 - 11.3 k/uL    RBC 4.65 3.95 - 5.11 m/uL    Hemoglobin 13.3 11.9 - 15.1 g/dL    Hematocrit 42.1 36.3 - 47.1 %    MCV 90.5 82.6 - 102.9 fL    MCH 28.6 25.2 - 33.5 pg    MCHC 31.6 28.4 - 34.8 g/dL    RDW 13.2 11.8 - 14.4 %    Platelets 464 274 - 207 k/uL    MPV 9.5 8.1 - 13.5 fL    NRBC Automated 0.0 0.0 per 100 WBC    Differential Type NOT REPORTED     Seg Neutrophils 49 36 - 65 %    Lymphocytes 38 24 - 43 %    Monocytes 9 3 - 12 %    Eosinophils % 4 1 - 4 %    Basophils 0 0 - 2 %    Immature Granulocytes 0 0 %    Segs Absolute 2.39 1.50 - 8.10 k/uL    Absolute Lymph # 1.89 1.10 - 3.70 k/uL    Absolute Mono # 0.43 0.10 - 1.20 k/uL    Absolute Eos # 0.19 0.00 - 0.44 k/uL    Basophils Absolute <0.03 0.00 - 0.20 k/uL    Absolute Immature Granulocyte <0.03 0.00 - 0.30 k/uL    WBC Morphology NOT REPORTED     RBC Morphology NOT REPORTED     Platelet Estimate NOT REPORTED    Basic Metabolic Panel w/ Reflex to MG   Result Value Ref Range    Glucose 122 (H) 70 - 99 mg/dL    BUN 10 6 - 20 mg/dL    CREATININE 0.79 0.50 - 0.90 mg/dL    Bun/Cre Ratio NOT REPORTED 9 - 20    Calcium 9.6 8.6 - 10.4 mg/dL    Sodium 139 135 - 144 mmol/L    Potassium 3.6 (L) 3.7 - 5.3 mmol/L    Chloride 103 98 - 107 mmol/L    CO2 21 20 - 31 mmol/L    Anion Gap 15 9 - 17 mmol/L    GFR Non-African American >60 >60 mL/min    GFR African American >60 >60 mL/min    GFR Comment          GFR Staging NOT REPORTED    Troponin   Result Value Ref MD  Emergency Medicine Resident Physician    This dictation was generated by voice recognition computer software. Although all attempts are made to edit the dictation for accuracy, there may be errors in the transcription that are not intended.

## 2020-06-26 NOTE — PROCEDURES
[facility]    Tilt Table Test Report                       Today's Date:  6/26/2020  Patient Name:  Kristin Cox  Date of admission: 6/25/2020 10:47 AM  Patient's age:  31 y.o., 1990  Admission Dx:  Dizziness [R42]    Requesting Physician: Saskia Madrigal MD      Procedures performed: Tilt table testing    Indication of the procedure: Kristin Cox is a 31 y.o. female who presented with near syncope. Details of procedure: The procedure, the risks, benefits and alternatives of the procedure were explained to the patient. All questions were answered. Patient verbalized understanding and informed consent was obtained. The patient was brought to the electrophysiology lab in a fasting nonsedated state. Peripheral IV access was obtained and the patient was put on the tilt table. Initial vital signs at baseline:    BP: 136/80 mmHg. HR: 108 bpm.    Then the tilt table was raised to 70 degree. Immediately upon raising the table the vitals were:     BP: 129/96 mmHg. HR: 136 bpm.    After 20 minutes vitals were:  BP: 136/99 mmHg. HR: 141 bpm.      After which the patient received 0.4 mg NTG sublingual. 4 minutes after NTG, the patient felt symptoms of like passing out and almost passed out  , and the recorded vital signs were:    BP: 96/58 mmHg. HR: 150 bpm.    The Tilt table test was immediately brought back to [place] position and the patient received IV fluid bolus. · Patient did  experience syncope during the tilt table test.     At the end of procedure:  BP: 122/66 mmHg. HR: 112 bpm.      The patient tolerated the procedure well and there were no complications. Conclusion:    Initial response Positive for POTS with vasodepressor response after nitroglycerin     Recommendations:  · Recommended to avoid dehydration, paying close attention to any prodromal symptoms and how to avert syncope by lying down and elevating legs.    · Patient also to have compression stocking and

## 2020-06-26 NOTE — FLOWSHEET NOTE
Patient was awake and oriented when  visited. Family was not present at the time. Patient remained hopeful and seemed to be doing well. Patient was raised [place].  maintained listening presence, offered support and prayed with patient. Patient was very thankful for the prayer said with her. Chaplains would continue to remain available for more prayers and support. 06/26/20 4802   Encounter Summary   Services provided to: Patient   Support System Family members   Place of [address] [place] [address] Visiting   (06/26/2020)   Complexity of Encounter Moderate   Length of Encounter 15 minutes   Spiritual Assessment Completed Yes   Routine   Type Initial   Assessment Calm; Approachable; Hopeful   Intervention Active listening;Nurtured hope;Prayer;Shorter   Outcome Expressed gratitude   Spiritual/Episcopalian   Type Spiritual support

## 2020-06-26 NOTE — CONSULTS
the lungs every 6 hours as needed for Wheezing or Shortness of Breath 12/18/19   Garima Arreaga MD   Handicap Placard MISC by Does not apply route Expires on 12/30/21 12/18/19   Garima Arreaga MD   thyroid (ARMOUR) 130 MG tablet Take 1 tablet by mouth daily 9/6/19   Garima Arreaga MD   loratadine (CLARITIN) 10 MG tablet Take 1 tablet by mouth daily 2/28/19   Garima Arreaga MD   naproxen (NAPROSYN) 500 MG tablet Take 1 tablet by mouth 2 times daily (with meals) 2/28/19   Garima Arreaga MD   Calcium Carbonate-Vitamin D (OYSTER SHELL CALCIUM/D) 500-200 MG-UNIT TABS Take 1 tablet by mouth daily 10/31/18   Garima Arreaga MD   [facility] Devices (ROLLER [place]) MISC Walker with wheels 3/30/18   Garima Arreaga MD   fluticasone Devin Paulino) 50 MCG/ACT nasal spray 1 spray by Nasal route daily 5/31/17   Garima Arreaga MD   Respiratory Therapy Supplies (Annita Augustin VALVED MDI CHAMBER) RICHARD To be used with inhaler 1/13/17   Naomi Hurtado MD       Current Medications: Scheduled Meds:   fluticasone  1 spray Nasal Daily    calcium carbonate-vitamin D  1 tablet Oral Daily    cetirizine  10 mg Oral Daily    naproxen  500 mg Oral BID WC    thyroid  135 mg Oral Daily    cephALEXin  500 mg Oral BID    tiZANidine  4 mg Oral TID    amitriptyline  100 mg Oral Nightly    sodium chloride flush  10 mL Intravenous 2 times per day    enoxaparin  40 mg Subcutaneous Daily     Continuous Infusions:  PRN Meds:.albuterol sulfate HFA, sodium chloride flush, acetaminophen     Allergies:  Latex; Nut [peanut-containing drug products]; and Peanut (diagnostic)    Social History:   reports that she has never smoked. She has never used smokeless tobacco. She reports that she does not drink alcohol or use drugs. Family History: family history includes Diabetes in her father and paternal grandmother; Heart Failure in her maternal grandfather and maternal grandmother.      Review of Systems   CONSTITUTIONAL:  negative for fevers, chills, fatigue and malaise    EYES:  negative for discharge    HEENT:  negative for epistaxis and sore throat    RESPIRATORY:  negative for cough, shortness of breath, wheezing    CARDIOVASCULAR:  negative for chest pain, palpitations, + dizziness , edema    GASTROINTESTINAL:  negative for nausea, vomiting, diarrhea, constipation, abdominal pain    GENITOURINARY:  negative for incontinence    MUSCULOSKELETAL:  negative for neck or back pain    NEUROLOGICAL:  negative for headaches, seizures and double vision   PSYCHIATRIC:  negative               PHYSICAL EXAM:    Blood pressure (!) 108/58, pulse 88, temperature 97.2 °F (36.2 °C), temperature source Oral, resp. rate 18, height 5' 9\" (1.753 m), weight 251 lb (113.9 kg), SpO2 96 %, not currently breastfeeding.     CONSTITUTIONAL: AOx4, no apparent distress, appears stated age   HEAD: normocephalic, atraumatic   EYES: PERRLA, EOMI   ENT: moist mucous membranes, uvula midline   NECK:  symmetric, no midline tenderness to palpation   LUNGS: clear to auscultation bilaterally   CARDIOVASCULAR: regular rate and rhythm, no murmurs, rubs or gallops   ABDOMEN: Soft, non-tender, non-distended with normal active bowel sounds   SKIN: no rash     DATA:    ECG:       Labs:     CBC:   Recent Labs     06/25/20  1207   WBC 4.9   HGB 13.3   HCT 42.1        BMP:   Recent Labs     06/25/20  1207      K 3.6*   CO2 21   BUN 10   CREATININE 0.79   LABGLOM >60   GLUCOSE 122*     FASTING LIPID PANEL:  Lab Results   Component Value Date    HDL 42 09/06/2019    TRIG 56 09/06/2019       Intake/Output Summary (Last 24 hours) at 6/26/2020 0915  Last data filed at 6/25/2020 1252  Gross per 24 hour   Intake 500 ml   Output --   Net 500 ml       IMPRESSION:    Patient Active Problem List   Diagnosis    Reactive airway disease    Allergic sinusitis    Psoriasis    Neck pain    Subclinical hypothyroidism    Chronic fatigue    Seasonal allergic rhinitis due to pollen    Anti-RNP antibodies present

## 2020-06-26 NOTE — H&P
Physician who either signs or Co-signs this chart in the absence of a cardiologist.    EKG Interpretation    Interpreted by observation physician  EKG Interpretation    Interpreted by me    Rhythm: normal sinus   Rate: normal  Axis: normal  Ectopy: none  Conduction: normal  ST Segments: no acute change  T Waves: Nonspecific T wave inversion in lead III  Q Waves: none    Clinical Impression: Nonspecific EKG, when compared with EKG dated 1 May 2020 T wave inversion in lead III is new    Andra Chowdary MD        RADIOLOGY:   I directly visualized the following  images and reviewed the radiologist interpretations:    Ct Head Wo Contrast    Result Date: 6/25/2020  EXAMINATION: CT OF THE HEAD WITHOUT CONTRAST  6/25/2020 1:51 pm TECHNIQUE: CT of the head was performed without the administration of intravenous contrast. Dose modulation, iterative reconstruction, and/or weight based adjustment of the mA/kV was utilized to reduce the radiation dose to as low as reasonably achievable. COMPARISON: CT brain 04/22/2017, MRI brain 11/21/2018, MRI CSF flow study 06/05/2019 HISTORY: ORDERING SYSTEM PROVIDED HISTORY: Dizziness, photophobia, diplopia, known Chiari malformation FINDINGS: BRAIN/VENTRICLES: No acute intracranial hemorrhage, mass effect or midline shift. No abnormal extra-axial fluid collection. The gray-white differentiation is maintained without evidence of an acute infarct. No evidence of hydrocephalus. Persistent cavum vergae. Mild cerebellar tonsillar herniation compatible with known Chiari malformation. ORBITS: The visualized portion of the orbits demonstrate no acute abnormality. SINUSES: The visualized paranasal sinuses and mastoid air cells demonstrate no acute abnormality. SOFT TISSUES/SKULL:  No acute abnormality of the visualized skull or soft tissues. Redemonstration of Chiari malformation, otherwise negative CT brain with no acute intracranial abnormality.      Xr Chest Portable    Result Date: have reviewed and interpreted all available lab results. Labs Reviewed   BASIC METABOLIC PANEL W/ REFLEX TO MG FOR LOW K - Abnormal; Notable for the following components:       Result Value    Glucose 122 (*)     Potassium 3.6 (*)     All other components within normal limits   CBC WITH AUTO DIFFERENTIAL   TROPONIN   D-DIMER, QUANTITATIVE   HCG, SERUM, QUALITATIVE       SCREENING TOOLS:      CDU IMPRESSION / PLAN      Yaquelin Diaz is a 31 y.o. female who presents with     1. Acute dizziness with reported intermittent hypotension and tachycardia of unclear etiology  - Negative orthostatic vitals in emergency department  -Negative CT PE study given recent surgery  - Neurosurgery consulted and following regarded recent sacral nerve cyst removal, no additional work-up per neurosurgery at this time  -Cardiology evaluation, recommends echo and tilt table  - dispo pending cards    · Continue home medications and pain control  · Monitor vitals, labs, and imaging  · DISPO: pending consults and clinical improvement    CONSULTS:    IP CONSULT TO NEUROSURGERY  IP CONSULT TO CARDIOLOGY  IP CONSULT TO NEUROSURGERY    PROCEDURES:  Not indicated       PATIENT REFERRED TO:    MD Lara Helm [address] [facility] [address] [id number] [phone]            --  Bradly Balbuena MD   Emergency Medicine Resident     This dictation was generated by voice recognition computer software. Although all attempts are made to edit the dictation for accuracy, there may be errors in the transcription that are not intended.

## 2020-06-26 NOTE — CONSULTS
Department of Neurosurgery                                                 Reason for Consult: Dizziness headache  Requesting Physician: Dr. Vasquez Abrams  Neurosurgeon:  Dr Valarie Koch    History Obtained From:  patient    CHIEF COMPLAINT:         Dizziness headache    HISTORY OF PRESENT ILLNESS:       31 y.o. female with Tarlov cyst resection and to take backs for CSF leaks approximately 10 days ago. Patient is known to me from the outpatient setting with known history of obesity with Chiari I malformation and Tarlov cyst with planned surgery in [place]. Since her discharge approximately 10 days ago the patient states that she was doing well with no further leakage or postural headaches which she distinctly recalls while an inpatient after surgery. More recently over the last few days without any inciting event she has had dizziness low blood pressures when she arises and difficulty with unsteadiness and ambulation. States that her radicular symptoms have improved significantly since the resection of a Tarlov cyst but she still has some unsteadiness. Labile blood pressures were noted by PCP and patient does state that she feels lightheaded when arising with lack of palpitations breathing difficulty.   Does not feel presyncopal.    PAST MEDICAL HISTORY :       Past Medical History:        Diagnosis Date    Allergic rhinitis     Class 2 obesity due to excess calories without serious comorbidity with body mass index (BMI) of 35.0 to 35.9 in adult 1894    Complication of anesthesia     epidural stopped working    Hypothyroidism     Neuropathy     Osteoarthritis     Thyroid disease        Past Surgical History:        Procedure Laterality Date     SECTION  4/11/10    EPIDURAL STEROID INJECTION Left 2019    EPIDURAL INJECTION s2 performed by Stephanie Valadez MD at [facility] Bilateral 2018    bilat facet block no steroid    NERVE BLOCK  2019    S2 day  sodium chloride flush 0.9 % injection 10 mL, 10 mL, Intravenous, PRN  acetaminophen (TYLENOL) tablet 650 mg, 650 mg, Oral, Q4H PRN  enoxaparin (LOVENOX) injection 40 mg, 40 mg, Subcutaneous, Daily    REVIEW OF SYSTEMS:       CONSTITUTIONAL: negative for fatigue, malaise, wt loss or gain   EYES: negative for double vision, photophobia, tunnel vision   HEENT: negative for tinnitus, hearing loss, sore throat, otorrhea, rhinorrhea   RESPIRATORY: negative for cough, shortness of breath, hemptysis   CARDIOVASCULAR: negative for chest pain, palpitations   GASTROINTESTINAL: negative for nausea, vomiting, diarrhea, hematamesis, melena   GENITOURINARY: negative for incontinence, urinary retention   MUSCULOSKELETAL: negative for new or worsened neck or back pain   NEUROLOGICAL: negative for seizures, new numbness, tingling, weakness, paresthesias   PSYCHIATRIC: negative for new or worsened anxiety or depression     Review of systems otherwise negative.     PHYSICAL EXAM:       /69   Pulse 105   Temp 98.8 °F (37.1 °C) (Oral)   Resp 18   Ht 5' 9\" (1.753 m)   Wt 251 lb (113.9 kg)   SpO2 95%   BMI 37.07 kg/m²     CONSTITUTIONAL: no apparent distress, appears stated age   HEAD: normocephalic, atraumatic, no quinteros sign or racoon eyes   ENT: moist mucous membranes, no rhinorrhea or otorrhea   NECK: supple, symmetric, no midline tenderness to palpation   BACK: without midline tenderness, step-offs or deformities   LUNGS: Normoxic, no accessorizing, equal chest rise and fall   CARDIOVASCULAR: regular rate and rhythm per telemetry   ABDOMEN: Soft, non-tender, non-distended    NEUROLOGIC:  EYE OPENING     Spontaneous - 4 []       To voice - 3 []       To pain - 2 []       None - 1 []    VERBAL RESPONSE     Appropriate, oriented - 5 []       Dazed or confused - 4 []       Syllables, expletives - 3 []       Grunts - 2 []       None - 1 []    MOTOR RESPONSE     Spontaneous, command - 6 []       Localizes pain - 5 [] lesion      ASSESSMENT AND PLAN:       Orthostasis  Chiari I malformation  Tarlov cyst  Iatrogenic CSF leak    Patient appears to be stable from a postoperative standpoint as far as CSF leak and wound. Management of orthostasis and blood pressure issues per primary I would recommend follow-up with her PCP. I did discuss with her close follow-up with a Chiari malformation which at this point I do not believe needs to be treated. We will await stabilization of her postoperative course after Tarlov cyst resection. I did explain to her my concern for altered CSF dynamics having had an infratentorial surgery in the setting of known Chiari. It is possible that she may develop Chiari symptoms progress in the interim.   We will allow for stabilization of her symptoms in the postoperative period for approximately 8 weeks before evaluating for any further interventions    Follow-up 6 to 8 weeks outpatient      [place] DO AMRITA Briscoe pager [phone]  6/26/2020  7:27 AM

## 2020-06-26 NOTE — ED PROVIDER NOTES
[address] [facility] SURG  Emergency Department Encounter  EmergencyMedicine Resident     Pt Jossy Huber  MRN: [id number]  [place] 1990  Date of evaluation: 20  PCP:  Jackie Salinas MD    [address] [place] [address]       Chief Complaint   Patient presents with    Dizziness       HISTORY OF PRESENT ILLNESS  (Location/Symptom, Timing/Onset, Context/Setting, Quality, Duration, Modifying Factors, Severity.)      Kristin Cox is a 31 y.o. female who presents with dizziness, headache, concerns about blood pressure. Patient states that she had an episode of hypotension at home today with blood pressure of 80/30 and then her blood pressure subsequently went back up to systolic of 029 on recheck. Patient states that she spoke with her primary care physician about this who instructed her to come the emergency department. Patient reporting headache as well as states she has chronic headache, chronic diplopia, chronic photophobia and chronic left leg weakness secondary to Chiari malformation and sacral nerve cyst.  Patient reports that she recently saw neurosurgery in [place] and had cyst removed from around her sacral nerve. Patient reporting some chest heaviness and a \"strange feeling\" in her chest today as well. Reports some intermittent, exertional shortness of breath as well. PAST MEDICAL / SURGICAL / SOCIAL / FAMILY HISTORY      has a past medical history of Allergic rhinitis, Class 2 obesity due to excess calories without serious comorbidity with body mass index (BMI) of 35.0 to 06.2 in adult, Complication of anesthesia, Hypothyroidism, Neuropathy, Osteoarthritis, and Thyroid disease. has a past surgical history that includes  section (4/11/10); Nerve Block (Bilateral, 2018); Nerve Block (2019); and epidural steroid injection (Left, 2019).       Social History     Socioeconomic History    Marital status:      Spouse name: Not on file    Number of children: Not on file    Years for level 4, 8 or more for level 5)      INITIAL VITALS:   /86   Pulse 99   Temp 97.4 °F (36.3 °C) (Oral)   Resp 18   Ht 5' 9\" (1.753 m)   Wt 251 lb (113.9 kg)   SpO2 98%   BMI 37.07 kg/m²     Physical Exam  Constitutional:       General: She is not in acute distress. Appearance: She is well-developed. She is not diaphoretic. HENT:      Head: Normocephalic. Right Ear: External ear normal.      Left Ear: External ear normal.      Mouth/Throat:      Pharynx: No oropharyngeal exudate. Eyes:      General: No scleral icterus. Right eye: No discharge. Left eye: No discharge. Pupils: Pupils are equal, round, and reactive to light. Comments: Bilateral fatigable horizontal nystagmus   Neck:      Musculoskeletal: Normal range of motion and neck supple. Vascular: No JVD. Cardiovascular:      Rate and Rhythm: Regular rhythm. Tachycardia present. Heart sounds: No murmur. No friction rub. No gallop. Pulmonary:      Effort: Pulmonary effort is normal. No respiratory distress. Breath sounds: Normal breath sounds. No stridor. No wheezing or rales. Chest:      Chest wall: No tenderness. Abdominal:      General: There is no distension. Palpations: Abdomen is soft. Tenderness: There is no abdominal tenderness. There is no guarding or rebound. Musculoskeletal: Normal range of motion. General: No tenderness or deformity. Skin:     General: Skin is warm and dry. Capillary Refill: Capillary refill takes less than 2 seconds. Coloration: Skin is not pale. Findings: No erythema or rash. Neurological:      Mental Status: She is alert and oriented to person, place, and time. Mental status is at baseline. Motor: Weakness (Weakness of plantar flexion in left leg, at patient's baseline) present. No abnormal muscle tone. Psychiatric:         Behavior: Behavior normal.         Thought Content:  Thought content normal.

## 2020-06-26 NOTE — DISCHARGE INSTR - COC
Continuity of Care Form    Patient Name: Yaquelin Diaz   :  1990  MRN:  [id number]    Admit date:  2020  Discharge date:  2020    Code Status Order: Full Code   Advance Directives:   Advance Care Flowsheet Documentation     Date/Time Healthcare Directive Type of Healthcare Directive Copy in [facility] [address] Agent's Name Healthcare Agent's Phone Number    20 1823  No, patient does not have an advance directive for healthcare treatment -- -- -- -- --          Admitting Physician:  Kristel Jenkins MD  PCP: Balta Blair MD    Discharging Nurse: [facility] Unit/Room#: 5488/9668-31  Discharging Unit Phone Number: [phone]    Emergency Contact:   Extended Emergency Contact Information  Primary Emergency Contact: Semaj Hatch [address] Phone: [phone]  Work Phone: [phone]  Mobile Phone: [phone]  Relation: Brother/Sister  Secondary Emergency Contact: [address] [place] [address] Phone: [phone]  Work Phone: [phone]  Mobile Phone: [phone]  Relation: Brother/Sister    Past Surgical History:  Past Surgical History:   Procedure Laterality Date     SECTION  4/11/10    EPIDURAL STEROID INJECTION Left 2019    EPIDURAL INJECTION s2 performed by [address], MD at 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

## 2020-06-28 LAB
EKG ATRIAL RATE: 95 BPM
EKG P AXIS: 38 DEGREES
EKG P-R INTERVAL: 132 MS
EKG Q-T INTERVAL: 338 MS
EKG QRS DURATION: 76 MS
EKG QTC CALCULATION (BAZETT): 424 MS
EKG R AXIS: 38 DEGREES
EKG T AXIS: 21 DEGREES
EKG VENTRICULAR RATE: 95 BPM

## 2020-06-28 PROCEDURE — 93010 ELECTROCARDIOGRAM REPORT: CPT | Performed by: INTERNAL MEDICINE

## 2020-06-29 ENCOUNTER — TELEPHONE (OUTPATIENT)
Dept: FAMILY MEDICINE CLINIC | Age: 30
End: 2020-06-29

## 2020-06-29 NOTE — PROGRESS NOTES
[facility]  CDU / OBSERVATION eNCOUnter  Attending NOte    Late note for 6/26/2020       I performed a history and physical examination of the patient and discussed management with the resident. I reviewed the residents note and agree with the documented findings and plan of care. Any areas of disagreement are noted on the chart. I was personally present for the key portions of any procedures. I have documented in the chart those procedures where I was not present during the key portions. I have reviewed the nurses notes. I agree with the chief complaint, past medical history, past surgical history, allergies, medications, social and family history as documented unless otherwise noted below. The Family history, social history, and ROS are effectively unchanged since admission unless noted elsewhere in the chart. Resolving hypotension. Patient seen previously for same. Patient seen also by neurosurgery to evaluate Arnold-Chiari malformation. No further intervention needed from neurosurgical standpoint. Cardiology has done further testing on patient. Arrange for good hydration and compression stockings. Patient for outpatient follow-up.      Júnior Burr MD  Attending Emergency  Physician

## 2020-06-30 ENCOUNTER — TELEMEDICINE (OUTPATIENT)
Dept: NEUROLOGY | Age: 30
End: 2020-06-30
Payer: MEDICARE

## 2020-06-30 ENCOUNTER — OFFICE VISIT (OUTPATIENT)
Dept: FAMILY MEDICINE CLINIC | Age: 30
End: 2020-06-30
Payer: MEDICARE

## 2020-06-30 VITALS
WEIGHT: 247 LBS | BODY MASS INDEX: 36.58 KG/M2 | SYSTOLIC BLOOD PRESSURE: 120 MMHG | HEIGHT: 69 IN | DIASTOLIC BLOOD PRESSURE: 60 MMHG | HEART RATE: 120 BPM | OXYGEN SATURATION: 98 %

## 2020-06-30 PROBLEM — G90.A POTS (POSTURAL ORTHOSTATIC TACHYCARDIA SYNDROME): Status: ACTIVE | Noted: 2020-06-30

## 2020-06-30 PROBLEM — H53.2 DOUBLE VISION: Status: ACTIVE | Noted: 2020-06-30

## 2020-06-30 PROBLEM — G96.00 CSF LEAK: Status: ACTIVE | Noted: 2020-06-30

## 2020-06-30 PROBLEM — G44.51 HEMICRANIA CONTINUA: Status: ACTIVE | Noted: 2020-06-30

## 2020-06-30 PROBLEM — G96.198: Status: ACTIVE | Noted: 2020-06-30

## 2020-06-30 PROCEDURE — G8428 CUR MEDS NOT DOCUMENT: HCPCS | Performed by: PSYCHIATRY & NEUROLOGY

## 2020-06-30 PROCEDURE — G8417 CALC BMI ABV UP PARAM F/U: HCPCS | Performed by: FAMILY MEDICINE

## 2020-06-30 PROCEDURE — 1036F TOBACCO NON-USER: CPT | Performed by: FAMILY MEDICINE

## 2020-06-30 PROCEDURE — 99214 OFFICE O/P EST MOD 30 MIN: CPT | Performed by: PSYCHIATRY & NEUROLOGY

## 2020-06-30 PROCEDURE — G8427 DOCREV CUR MEDS BY ELIG CLIN: HCPCS | Performed by: FAMILY MEDICINE

## 2020-06-30 PROCEDURE — 99215 OFFICE O/P EST HI 40 MIN: CPT | Performed by: FAMILY MEDICINE

## 2020-06-30 RX ORDER — OXYCODONE HYDROCHLORIDE AND ACETAMINOPHEN 5; 325 MG/1; MG/1
1 TABLET ORAL EVERY 4 HOURS PRN
COMMUNITY
End: 2020-08-26 | Stop reason: ALTCHOICE

## 2020-06-30 NOTE — PROGRESS NOTES
[facility]  Offices: [address], [address], [place], [address] [facility]  [address], [address], [place], [address] [facility]  [address] [facility] [address] [facility] [address].  Phone: [phone]  Fax: [phone]    MD Hang Palacio MD Ahmed B. Crecencio Olden, MD Edilia Corns, MD Katrina Prior, MD Evert Gerardo, CNP    TELEHEALTH VISIT        6/30/2020      HISTORY OF PRESENT ILLNESS:       I had the pleasure of seeing Margy Tavares, who returns for continuing neurologic care for chronic daily headaches (onset around 2013).    She also has a history of Chiari type I, following with neurosurgery. Since her last visit, she underwent surgery for symptomatic Tarlov cysts in [facility] on May 29. Patient reports she was having shooting pain on her left leg as well as her buttocks to the point that she could not sit down or lay down without significant discomfort. She could not find a neurosurgeon that would operate in [place], so she traveled to [facility] to get the surgery done. Unfortunately, her surgery was complicated by 2 CSF leaks, and she had to have corrective surgery done on May 29 and June 5. Her sciatic pain did improve after the surgery, but she also developed severe worsening headache, diplopia and photosensitivity. Her diplopia is horizontal, not worse with horizontal gaze. She reports she saw an ophthalmologist who then referred her to neuro ophthalmology, appointment currently pending. The diplopia is improving, but still present. She currently does not drive. Since her surgery, her migraines have also worsened. She is having a headache daily. Her last Botox was on February 26. Patient reports the Botox was helping up until her surgery. She was only having a migraine about once a week with decreased severity of 3/10. Her headaches are located in the bilateral occipital areas, described as a dull and achy pain with no associated nausea, photophobia or phonophobia. Headache severity ranges from 5-10 over 10, lasting for at least 6 to 8 hours. Stress aggravates her headaches, she denies any alleviating factors. Singing, yawning, laughing occasionally triggers her headaches. She is back to taking amitriptyline 100 mg at bedtime which helps with her sleep. She denies any daytime sedation with it. Patient reports she was also recently diagnosed with POTS, was admitted at [facility] and was seen by cardiology. She was also seen by neurosurgery and was advised to follow-up as an outpatient. Prior medication trials: Amitriptyline 25 mg (worked, sedation), Topamax (cognitive slowing), verapamil  mg (no relief), Emgality (partial relief)    Prior testing reviewed:  Toyin  without contrast 18: Unremarkable  Noncontrast head CT 2017: Unremarkable  PSG 2018: normal  MRI CSF flow 19:   Chiari type 1 malformation.       Normal appearing CSF flow.      PAST MEDICAL HISTORY:         Diagnosis Date    Allergic rhinitis     Class 2 obesity due to excess calories without serious comorbidity with body mass index (BMI) of 35.0 to 35.9 in adult 3247    Complication of anesthesia     epidural stopped working    Hypothyroidism     Neuropathy     Osteoarthritis     POTS (postural orthostatic tachycardia syndrome)     Thyroid disease     Vasovagal syncope         PAST SURGICAL HISTORY:         Procedure Laterality Date     SECTION  4/11/10    EPIDURAL STEROID INJECTION Left 2019    EPIDURAL INJECTION s2 performed by Stephanie Valadez MD at [facility] Bilateral 2018    bilat facet block no steroid    NERVE BLOCK  2019    S2 bupivacaine injection    SPINE SURGERY      sacral cysts and then several surgeries to correct spinal fluid leaks        SOCIAL HISTORY:     Social History     Socioeconomic History    Marital status:      Spouse name: Not on file    Number of children: Not on file    Years of education: Not on file    Highest education level: Not on file   Occupational History    Not on file   Social Needs    Financial resource strain: Not on file    Food insecurity     Worry: Not on file     Inability: Not on file    Transportation needs     Medical: Not on file     Non-medical: Not on file   Tobacco Use    Smoking status: Never Smoker    Smokeless tobacco: Never Used   Substance and Sexual Activity    Alcohol use: No    Drug use: No    Sexual activity: Not Currently     Partners: Male   Lifestyle    Physical activity     Days per week: Not on file     Minutes per session: Not on file    Stress: Not on file   Relationships    Social connections     Talks on phone: Not on file     Gets together: Not on file     Attends Quaker service: Not on file     Active member of club or organization: Not on file     Attends meetings of clubs or organizations: Not on file     Relationship status: Not on file    Intimate partner violence     Fear of current or ex partner: Not on file     Emotionally abused: Not on file     Physically abused: Not on file     Forced sexual activity: Not on file   Other Topics Concern    Not on file   Social History Narrative    Not on file       CURRENT MEDICATIONS:     Current Outpatient Medications   Medication Sig Dispense Refill    Methocarbamol (ROBAXIN PO) Take by mouth as needed      oxyCODONE-acetaminophen (PERCOCET) 5-325 MG per tablet Take 1 tablet by mouth every 4 hours as needed for Pain.  Pt got after surgery but has not had to use recently      amitriptyline (ELAVIL) 100 MG tablet Take 1 tablet by mouth nightly (Patient taking differently: Take 100 mg by mouth nightly restarted just recently only at 25 mg qhs but will titrate up) 30 tablet 3    tiZANidine (ZANAFLEX) 4 MG tablet take 1 tablet by mouth three times a day (Patient taking differently: take 1 tablet by mouth three times a day prn) 60 tablet 3    SUMAtriptan (IMITREX) 100 MG tablet Take 1 tablet by mouth once as needed for Migraine 10 tablet 3    albuterol sulfate  (90 Base) MCG/ACT inhaler Inhale 2 puffs into the lungs every 6 hours as needed for Wheezing or Shortness of Breath 1 Inhaler 1    Handicap Placard MISC by Does not apply route Expires on 12/30/21 1 each 0    thyroid (ARMOUR) 130 MG tablet Take 1 tablet by mouth daily 90 tablet 3    loratadine (CLARITIN) 10 MG tablet Take 1 tablet by mouth daily 30 tablet 2    naproxen (NAPROSYN) 500 MG tablet Take 1 tablet by mouth 2 times daily (with meals) 120 tablet 3    Calcium Carbonate-Vitamin D (OYSTER SHELL CALCIUM/D) 500-200 MG-UNIT TABS Take 1 tablet by mouth daily 30 tablet 3    Misc. Devices (ROLLER WALKER) [address] [facility] Walker with wheels 1 each 0    Respiratory Therapy Supplies (BREATHERITE VALVED MDI CHAMBER) RICHARD To be used with inhaler 1 Device 1    fluticasone (FLONASE) 50 MCG/ACT nasal spray 1 spray by Nasal route daily (Patient not taking: Reported on 6/30/2020) 1 Bottle 3     No current facility-administered medications for this visit. ALLERGIES:     Allergies   Allergen Reactions    Latex Hives and Swelling    Nut [Peanut-Containing Drug Products] Nausea And Vomiting    Peanut (Diagnostic)      Other reaction(s): Nausea And Vomiting                             REVIEW OF SYSTEMS  10 point Review of Systems was performed and was negative other than for those mentioned above.       PHYSICAL EXAMINATION:                                         .                                                                                                    General Appearance:  Alert, cooperative, no signs of distress, appears stated age   Head:  Normocephalic, no signs of trauma   Eyes:  Conjunctiva/corneas clear;  eyelids intact   Ears:  Normal external ear and canals   Nose: Nares normal, no drainage    Throat: Lips and tongue normal; teeth normal;  gums normal   Extremities: Extremities

## 2020-06-30 NOTE — PROGRESS NOTES
syndrome  Improved with compression stockings, IV fluids, p.o. fluids        Stressed to patient the importance of following up with primary care doctor for further workup/management of symptoms. Pt verbalizes understanding and agrees with plan.       /60   Pulse 120   Ht 5' 9\" (1.753 m)   Wt 247 lb (112 kg)   LMP 05/20/2020 (Approximate)   SpO2 98%   BMI 36.48 kg/m²    Body mass index is 36.48 kg/m². Wt Readings from Last 3 Encounters:   06/30/20 247 lb (112 kg)   06/25/20 251 lb (113.9 kg)   04/30/20 252 lb (114.3 kg)        [x]Negative depression screening. PHQ Scores 4/30/2020 2/28/2019 10/31/2018 1/9/2018 12/27/2016   PHQ2 Score 0 0 0 0 0   PHQ9 Score 0 0 0 0 0      []1-4 = Minimal depression   []5-9 = Milddepression   []10-14 = Moderate depression   []15-19 = Moderately severe depression   []20-27 = Severe depression    Discussed testing with the patient and all questions fully answered.     Admission on 06/25/2020, Discharged on 06/26/2020   Component Date Value Ref Range Status    Ventricular Rate 06/25/2020 95  BPM Final    Atrial Rate 06/25/2020 95  BPM Final    P-R Interval 06/25/2020 132  ms Final    QRS Duration 06/25/2020 76  ms Final    Q-T Interval 06/25/2020 338  ms Final    QTc Calculation (Bazett) 06/25/2020 424  ms Final    P Axis 06/25/2020 38  degrees Final    R Axis 06/25/2020 38  degrees Final    T Axis 06/25/2020 21  degrees Final    WBC 06/25/2020 4.9  3.5 - 11.3 k/uL Final    RBC 06/25/2020 4.65  3.95 - 5.11 m/uL Final    Hemoglobin 06/25/2020 13.3  11.9 - 15.1 g/dL Final    Hematocrit 06/25/2020 42.1  36.3 - 47.1 % Final    MCV 06/25/2020 90.5  82.6 - 102.9 fL Final    MCH 06/25/2020 28.6  25.2 - 33.5 pg Final    MCHC 06/25/2020 31.6  28.4 - 34.8 g/dL Final    RDW 06/25/2020 13.2  11.8 - 14.4 % Final    Platelets 47/98/1376 177  138 - 453 k/uL Final    MPV 06/25/2020 9.5  8.1 - 13.5 fL Final    NRBC Automated 06/25/2020 0.0  0.0 per 100 WBC Final    Final    Comment:       High Sensitivity Troponin values cannot be compared with other Troponin methodologies. Patients with high levels of Biotin oral intake (i.e >5mg/day) may have falsely decreased   Troponin levels. Samples collected within 8 hours of biotin intake may require additional   information for diagnosis.  Troponin T 06/25/2020 NOT REPORTED  <0.03 ng/mL Final    Troponin Interp 06/25/2020 NOT REPORTED   Final    D-Dimer, Quant 06/25/2020 2.29  mg/L FEU Final    Comment:        When combined with a low clinical probability, a D dimer value of <0.50 mg/L FEU is   considered negative for DVT and PE (negative predictive value of 98%, sensitivity of 97%). If this test is not being used to help rule out DVT and PE, then the following reference   range should be utilized: 0.00 - 1.02 mg/L FEU. The [employer] D-Dimer assay is intended for use as an aid in the diagnosis of venous   thromboembolism (DVT and PE) and the results should be interpreted in conjunction with the   patient's medical history, clinical presentation, and other findings. Elevated levels of D-dimer activity can be seen in any state of coagulation activation and   is not recommended in patients with therapeutic dose anticoagulant therapy for >24 hours,   fibrinolytic therapy within the previous 7 days, trauma or surgery within the previous 4   weeks,   disseminated malignancies, aortic aneurysm, sepsis, severe infections, pneumonia, severe   skin infections, liver cirrhosis, advance                           d age, coronary disease, diabetes, and pregnancy. A very low percentage of patients with DVT may yield D-dimer results below the cutoff of   0.5 mg/L FEU. This is known to be more prevalent in patients with distal DVT.  hCG Qual 06/25/2020 NEGATIVE  NEGATIVE Final    Comment: Specimens with hCG levels near the threshold of the test (25 mIU/mL) may give a negative or   indeterminate result.   In such cases, another test should be performed with a new specimen   in 48-72 hours. If early pregnancy is suspected clinically in this setting, correlation   with quantitative serum b-hCG level is suggested. [employer] has confirmed the use of plasma for this test. This has not been cleared   or approved by the U.S. Food and Drug Administration. The FDA has determined that such   clearance is not necessary.  Troponin, High Sensitivity 06/26/2020 11  0 - 14 ng/L Final    Comment:       High Sensitivity Troponin values cannot be compared with other Troponin methodologies. Patients with high levels of Biotin oral intake (i.e >5mg/day) may have falsely decreased   Troponin levels. Samples collected within 8 hours of biotin intake may require additional   information for diagnosis.       Troponin T 06/26/2020 NOT REPORTED  <0.03 ng/mL Final    Troponin Interp 06/26/2020 NOT REPORTED   Final         Most recent labs reviewed:     Lab Results   Component Value Date    WBC 4.9 06/25/2020    HGB 13.3 06/25/2020    HCT 42.1 06/25/2020    MCV 90.5 06/25/2020     06/25/2020       @BRIEFLAB(NA,K,CL,CO2,BUN,CREATININE,GLUCOSE,CALCIUM)@     Lab Results   Component Value Date    ALT 30 04/30/2020    AST 22 04/30/2020    ALKPHOS 73 04/30/2020    BILITOT 0.65 04/30/2020       Lab Results   Component Value Date    TSH 6.23 (H) 04/30/2020       Lab Results   Component Value Date    CHOL 184 09/06/2019     Lab Results   Component Value Date    TRIG 56 09/06/2019     Lab Results   Component Value Date    HDL 42 09/06/2019     Lab Results   Component Value Date    LDLCHOLESTEROL 131 (H) 09/06/2019     Lab Results   Component Value Date    VLDL NOT REPORTED 09/06/2019     Lab Results   Component Value Date    CHOLHDLRATIO 4.4 09/06/2019       Lab Results   Component Value Date    LABA1C 5.3 02/28/2019       Lab Results   Component Value Date    RMXFRTBD48 320 05/31/2017       Lab Results   Component Value Date    FOLATE 14.8 05/31/2017       No results found for: IRON, TIBC, FERRITIN    Lab Results   Component Value Date    VITD25 27.4 (L) 04/30/2020             Current Outpatient Medications   Medication Sig Dispense Refill    Methocarbamol (ROBAXIN PO) Take by mouth as needed      oxyCODONE-acetaminophen (PERCOCET) 5-325 MG per tablet Take 1 tablet by mouth every 4 hours as needed for Pain. Pt got after surgery but has not had to use recently      amitriptyline (ELAVIL) 100 MG tablet Take 1 tablet by mouth nightly (Patient taking differently: Take 100 mg by mouth nightly restarted just recently only at 25 mg qhs but will titrate up) 30 tablet 3    tiZANidine (ZANAFLEX) 4 MG tablet take 1 tablet by mouth three times a day (Patient taking differently: take 1 tablet by mouth three times a day prn) 60 tablet 3    SUMAtriptan (IMITREX) 100 MG tablet Take 1 tablet by mouth once as needed for Migraine 10 tablet 3    albuterol sulfate  (90 Base) MCG/ACT inhaler Inhale 2 puffs into the lungs every 6 hours as needed for Wheezing or Shortness of Breath 1 Inhaler 1    Handicap Placard MISC by Does not apply route Expires on 12/30/21 1 each 0    thyroid (ARMOUR) 130 MG tablet Take 1 tablet by mouth daily 90 tablet 3    loratadine (CLARITIN) 10 MG tablet Take 1 tablet by mouth daily 30 tablet 2    naproxen (NAPROSYN) 500 MG tablet Take 1 tablet by mouth 2 times daily (with meals) 120 tablet 3    Calcium Carbonate-Vitamin D (OYSTER SHELL CALCIUM/D) 500-200 MG-UNIT TABS Take 1 tablet by mouth daily 30 tablet 3    Misc. Devices (ROLLER WALKER) MISC Walker with wheels 1 each 0    fluticasone (FLONASE) 50 MCG/ACT nasal spray 1 spray by Nasal route daily 1 Bottle 3    Respiratory Therapy Supplies (BREATHERITE VALVED MDI CHAMBER) RICHARD To be used with inhaler 1 Device 1     No current facility-administered medications for this visit.               Social History     Socioeconomic History    Marital status:  swelling, hearing loss, postnasal drip, rhinorrhea and sinus pressure. Eyes: Positive for visual disturbance. Negative for photophobia, redness and itching. Respiratory: Negative for cough, chest tightness, shortness of breath and wheezing. Cardiovascular: Negative for chest pain, palpitations and leg swelling. Gastrointestinal: Negative for abdominal distention, constipation, diarrhea and vomiting. Endocrine: Negative for polyphagia and polyuria. Genitourinary: Negative for difficulty urinating, dysuria, frequency and pelvic pain. Musculoskeletal: Positive for arthralgias, back pain and gait problem. Negative for joint swelling, myalgias, neck pain and neck stiffness. Skin: Negative for color change and wound. Neurological: Positive for dizziness, weakness, light-headedness, numbness and headaches. Psychiatric/Behavioral: Positive for decreased concentration. Negative for agitation, behavioral problems, dysphoric mood, hallucinations, sleep disturbance and suicidal ideas. The patient is not nervous/anxious. Physical Exam  Vitals signs and nursing note reviewed. Constitutional:       Appearance: Normal appearance. She is obese. HENT:      Head: Normocephalic and atraumatic. Right Ear: Tympanic membrane normal.      Left Ear: Tympanic membrane normal.      Nose: Nose normal.      Mouth/Throat:      Mouth: Mucous membranes are moist.   Eyes:      Extraocular Movements: Extraocular movements intact. Pupils: Pupils are equal, round, and reactive to light. Neck:      Musculoskeletal: Normal range of motion and neck supple. Cardiovascular:      Rate and Rhythm: Normal rate and regular rhythm. Heart sounds: Normal heart sounds. No murmur. Pulmonary:      Effort: Pulmonary effort is normal.      Breath sounds: Normal breath sounds. No wheezing. Abdominal:      General: Bowel sounds are normal.      Palpations: Abdomen is soft. There is no mass.       Tenderness: following healthy behaviors: nutrition, exercise and medication adherence  Reviewed prior labs and health maintenance  Continue current medications, diet and exercise. Discussed use, benefit, and side effects of prescribed medications. Barriers to medication compliance addressed. Patient given educational materials - see patient instructions  Was a self-tracking handout given in paper form or via [employer]? Yes    Requested Prescriptions      No prescriptions requested or ordered in this encounter       All patient questions answered. Patient voiced understanding. Quality Measures    Body mass index is 36.48 kg/m². Elevated. Weight control planned discussed Healthy diet and regular exercise. BP: 120/60 Blood pressure is normal. Treatment plan consists of No treatment change needed. Lab Results   Component Value Date    LDLCHOLESTEROL 131 (H) 09/06/2019    (goal LDL reduction with dx if diabetes is 50% LDL reduction)      PHQ Scores 4/30/2020 2/28/2019 10/31/2018 1/9/2018 12/27/2016   PHQ2 Score 0 0 0 0 0   PHQ9 Score 0 0 0 0 0     Interpretation of Total Score Depression Severity: 1-4 = Minimal depression, 5-9 = Mild depression, 10-14 = Moderate depression, 15-19 = Moderately severe depression, 20-27 = Severe depression    The patient'spast medical, surgical, social, and family history as well as her   current medications and allergies were reviewed as documented in today's encounter. Medications, labs, diagnostic studies, consultations andfollow-up as documented in this encounter. Return in about 3 months (around 9/30/2020). Patient wasseen with total face to face time of 40 minutes. More than 50% of this visit was counseling and education.        Future Appointments   Date Time Provider Westley Smith   10/6/2020  9:00 AM [facility]     This note was completed by using the assistance of a speech-recognition program. However, inadvertent computerized transcription errors may be present. Althoughevery effort was made to ensure accuracy, no guarantees can be provided that every mistake has been identified and corrected by editing.   Electronically signed by Saida Mckenna MD on 7/1/2020  12:44 PM

## 2020-06-30 NOTE — PROGRESS NOTES
Visit Information    Have you changed or started any medications since your last visit including any over-the-counter medicines, vitamins, or herbal medicines? no   Are you having any side effects from any of your medications? -  no  Have you stopped taking any of your medications? Is so, why? -  no    Have you seen any other physician or provider since your last visit? Yes - Records Obtained  Have you had any other diagnostic tests since your last visit? Yes - Records Obtained  Have you been seen in the emergency room and/or had an admission to a hospital since we last saw you? Yes - Records Obtained  Have you had your routine dental cleaning in the past 6 months? no    Have you activated your [employer] account? If not, what are your barriers?  Yes     Patient Care Team:  Laura Dykes MD as PCP - General (Family Medicine)  Laura Dykes MD as PCP - [facility]    Medical History Review  Past Medical, Family, and Social History reviewed and does contribute to the patient presenting condition    Health Maintenance   Topic Date Due    Varicella vaccine (1 of 2 - 2-dose childhood series) 04/04/1990    Pneumococcal 0-64 years Vaccine (1 of 1 - PPSV23) 04/04/1995    Cervical cancer screen  04/04/2010    A1C test (Diabetic or Prediabetic)  02/28/2020    Flu vaccine (Season Ended) 09/01/2020    TSH testing  04/30/2021    DTaP/Tdap/Td vaccine (2 - Td) 12/27/2026    HIV screen  Completed    Hepatitis A vaccine  Aged Out    Hepatitis B vaccine  Aged Out    Hib vaccine  Aged Out    Meningococcal (ACWY) vaccine  Aged Out

## 2020-07-01 ASSESSMENT — ENCOUNTER SYMPTOMS
WHEEZING: 0
PHOTOPHOBIA: 0
ABDOMINAL DISTENTION: 0
CHEST TIGHTNESS: 0
SINUS PRESSURE: 0
VOMITING: 0
COUGH: 0
EYE ITCHING: 0
SHORTNESS OF BREATH: 0
CONSTIPATION: 0
RHINORRHEA: 0
DIARRHEA: 0
BACK PAIN: 1
FACIAL SWELLING: 0
COLOR CHANGE: 0
EYE REDNESS: 0

## 2020-08-26 ENCOUNTER — TELEMEDICINE (OUTPATIENT)
Dept: FAMILY MEDICINE CLINIC | Age: 30
End: 2020-08-26
Payer: MEDICARE

## 2020-08-26 PROCEDURE — G8427 DOCREV CUR MEDS BY ELIG CLIN: HCPCS | Performed by: FAMILY MEDICINE

## 2020-08-26 PROCEDURE — 99214 OFFICE O/P EST MOD 30 MIN: CPT | Performed by: FAMILY MEDICINE

## 2020-08-26 ASSESSMENT — ENCOUNTER SYMPTOMS
BACK PAIN: 1
ABDOMINAL PAIN: 0
SHORTNESS OF BREATH: 0
RHINORRHEA: 0
NAUSEA: 0
VOMITING: 0
COUGH: 0
CONSTIPATION: 0
PHOTOPHOBIA: 0
WHEEZING: 0
BLOOD IN STOOL: 0
ABDOMINAL DISTENTION: 0

## 2020-08-26 NOTE — PROGRESS NOTES
[place] [address] [address] [facility]  [address] [facility] [address]  Phone: [phone], Fax: [phone]    TELEHEALTH EVALUATION -- Audio/Visual ([facility])    Patient ID verified by me prior to start of this visit    Kalyani Smith (:  1990) has requested an audio/video evaluation for the following concern(s):  Chief Complaint   Patient presents with    Procedure      HPI:  Kalyani Smith is an established patient of Roddie Gottron, MD  . Patient has a history of Chiari malformation, lumbar spinal cord cyst, had surgery done. Patient needs MRI lumbar spine for postop follow-up. Patient had 3 months since surgery and had CSF leak followed by multiple complications. Patient had surgery done in [place]  And reports she has virtual visit recently and was advised to have a lumbar spine MRI for follow-up. Patient reports her symptoms are tremendously improved. She has not used her walker since last 3 weeks, she is not using any narcotic medications for pain relief and she has taken muscle relaxant only 5 times. Patient reports she feels overall good after surgery. Review of Systems   Constitutional: Negative for activity change, appetite change, fatigue and unexpected weight change. HENT: Negative for congestion, nosebleeds, postnasal drip and rhinorrhea. Eyes: Negative for photophobia and visual disturbance. Respiratory: Negative for cough, shortness of breath and wheezing. Cardiovascular: Negative for chest pain, palpitations and leg swelling. Gastrointestinal: Negative for abdominal distention, abdominal pain, blood in stool, constipation, nausea and vomiting. Endocrine: Negative for polyphagia and polyuria. Genitourinary: Negative for difficulty urinating, flank pain, frequency, hematuria, urgency and vaginal pain. Musculoskeletal: Positive for arthralgias and back pain. Negative for gait problem, joint swelling, myalgias and neck stiffness. Allergies   Allergen Reactions    Latex Hives and Swelling    Nut [Peanut-Containing Drug Products] Nausea And Vomiting    Peanut (Diagnostic)      Other reaction(s): Nausea And Vomiting        Prior to Visit Medications    Medication Sig Taking? Authorizing Provider   amitriptyline (ELAVIL) 100 MG tablet Take 1 tablet by mouth nightly  Patient taking differently: Take 100 mg by mouth nightly restarted just recently only at 25 mg qhs but will titrate up Yes Michelle Dalton MD   tiZANidine (ZANAFLEX) 4 MG tablet take 1 tablet by mouth three times a day  Patient taking differently: take 1 tablet by mouth three times a day prn Yes Tres Gerardo MD   albuterol sulfate  (90 Base) MCG/ACT inhaler Inhale 2 puffs into the lungs every 6 hours as needed for Wheezing or Shortness of Breath Yes Tres Gerardo MD   Handicap Placard MISC by Does not apply route Expires on 12/30/21 Yes Tres Gerardo MD   thyroid (ARMOUR) 130 MG tablet Take 1 tablet by mouth daily Yes Tres Gerardo MD   loratadine (CLARITIN) 10 MG tablet Take 1 tablet by mouth daily Yes Tres Gerardo MD   naproxen (NAPROSYN) 500 MG tablet Take 1 tablet by mouth 2 times daily (with meals) Yes Tres Gerardo MD   Calcium Carbonate-Vitamin D (OYSTER SHELL CALCIUM/D) 500-200 MG-UNIT TABS Take 1 tablet by mouth daily Yes Tres Gerardo MD   [facility]  Devices (Arvil Citron) [address] [facility] Walker with wheels Yes Tres Gerardo MD   fluticasone (FLONASE) 50 MCG/ACT nasal spray 1 spray by Nasal route daily Yes Tres Gerardo MD   Respiratory Therapy Supplies (Aung Daniel VALVED MDI CHAMBER) RICHARD To be used with inhaler Yes Garry Alonzo MD   SUMAtriptan (IMITREX) 100 MG tablet Take 1 tablet by mouth once as needed for Migraine  Michelle Dalton MD       Social History     Tobacco Use    Smoking status: Never Smoker    Smokeless tobacco: Never Used   Substance Use Topics    Alcohol use: No    Drug use: No        PHYSICAL EXAMINATION:  Vital Signs: (As obtained by patient/caregiver or practitioner observation)  Patient-Reported Vitals 8/25/2020   Patient-Reported Weight 240   Patient-Reported Height 59   Patient-Reported Systolic 968   Patient-Reported Diastolic 73   Patient-Reported Pulse 86   Patient-Reported Temperature 97.6        Constitutional: [x] Appears well-developed and well-nourished [x] No apparent distress      [] Abnormal-   Mental status  [x] Alert and awake  [x] Oriented to person/place/time [x]Able to follow commands      Eyes:  EOM    [x]  Normal  [] Abnormal-  Sclera  [x]  Normal  [] Abnormal -         Discharge [x]  None visible  [] Abnormal -    HENT:   [x] Normocephalic, atraumatic.   [] Abnormal   [x] Mouth/Throat: Mucous membranes are moist.     External Ears [x] Normal  [] Abnormal-     Neck: [x] No visualized mass     Pulmonary/Chest: [x] Respiratory effort normal.  [x] No visualized signs of difficulty breathing or respiratory distress        [] Abnormal     Musculoskeletal:   [x] Normal gait with no signs of ataxia         [x] Normal range of motion of neck        [] Abnormal-     Neurological:        [x] No Facial Asymmetry (Cranial nerve 7 motor function) (limited exam to video visit)          [x] No gaze palsy        [] Abnormal-     Skin:        [x] No significant exanthematous lesions or discoloration noted on facial skin         [] Abnormal-     Psychiatric:       [x] Normal Affect [x] No Hallucinations        [x] Abnormal- Anxious, with pressured speech    Other pertinent observable physical exam findings-   Lab Results   Component Value Date    WBC 4.9 06/25/2020    HGB 13.3 06/25/2020    HCT 42.1 06/25/2020    MCV 90.5 06/25/2020     06/25/2020     Lab Results   Component Value Date     06/25/2020    K 3.6 06/25/2020     06/25/2020    CO2 21 06/25/2020    BUN 10 06/25/2020    CREATININE 0.79 06/25/2020    GLUCOSE 122 06/25/2020    CALCIUM 9.6 06/25/2020      Lab Results   Component Value Date    ALT 30 04/30/2020    AST 22 04/30/2020    ALKPHOS 73 04/30/2020    BILITOT 0.65 04/30/2020     Lab Results   Component Value Date    TSH 6.23 (H) 04/30/2020     Lab Results   Component Value Date    CHOL 184 09/06/2019     Lab Results   Component Value Date    TRIG 56 09/06/2019     Lab Results   Component Value Date    HDL 42 09/06/2019     Lab Results   Component Value Date    LDLCHOLESTEROL 131 (H) 09/06/2019     Lab Results   Component Value Date    VLDL NOT REPORTED 09/06/2019     Lab Results   Component Value Date    CHOLHDLRATIO 4.4 09/06/2019     Lab Results   Component Value Date    LABA1C 5.3 02/28/2019     Lab Results   Component Value Date    NZEMLRJT20 320 05/31/2017     Lab Results   Component Value Date    FOLATE 14.8 05/31/2017     No results found for: IRON, TIBC, FERRITIN  Lab Results   Component Value Date    VITD25 27.4 (L) 04/30/2020      Due to this being a TeleHealth encounter, evaluation of the following organ systems is limited: Vitals/Constitutional/EENT/Resp/CV/GI//MS/Neuro/Skin/Heme-Lymph-Imm. ASSESSMENT/PLAN:  1. Chiari malformation type II ([facility] [address]  Symptoms has improved MRI lumbar spine ordered  - MRI LUMBAR SPINE WO CONTRAST; Future    2. Cyst of spinal meninges  Doing well after surgery  - MRI LUMBAR SPINE WO CONTRAST; Future    3. Hemicrania continua  Doing well MRI lumbar spine for postop follow-up. - MRI LUMBAR SPINE WO CONTRAST; Future    Controlled Substance Monitoring:  Acute and Chronic Pain Monitoring:   RX Monitoring 10/31/2018   Attestation The Prescription Monitoring Report for this patient was reviewed today. Acute Pain Prescriptions Severe pain not adequately treated with lower dose. Periodic Controlled Substance Monitoring Possible medication side effects, risk of tolerance/dependence & alternative treatments discussed. ;No signs of potential drug abuse or diversion identified. Chronic Pain > 80 MEDD Reviewed the patient's functional status and documentation.

## 2020-09-16 ENCOUNTER — TELEMEDICINE (OUTPATIENT)
Dept: NEUROSURGERY | Age: 30
End: 2020-09-16
Payer: MEDICARE

## 2020-09-16 PROCEDURE — 99213 OFFICE O/P EST LOW 20 MIN: CPT | Performed by: NEUROLOGICAL SURGERY

## 2020-09-16 PROCEDURE — G8427 DOCREV CUR MEDS BY ELIG CLIN: HCPCS | Performed by: NEUROLOGICAL SURGERY

## 2020-09-16 NOTE — PROGRESS NOTES
2020    TELEHEALTH EVALUATION -- Audio/Visual (During [facility]-38 public health emergency)    HPI:    Pati Casarez (:  1990) has requested an audio/video evaluation for the following concern(s):    11-year-old female status post Tarlov cyst resection is doing very well with no axial pain and resolved radicular symptoms. She complains of persistent headache that is occipital in origin radiating upwards. Pain appears to come on with any type of motion bending twisting or lifting. In addition she notes distinct worsening with any type of Valsalva including coughing sneezing as well as bowel movements. She notices some tingling of bilateral upper extremities that is associated predominately in the distal greater than proximal regions. Denies any apneas. Denies swallowing difficulty. Patient states that the headaches have been persistent for 15 to 16 years and are becoming progressively more and more disabling. Does note some light sensitivity and vertigo as well. She has been using ice muscle relaxants and Tylenol that decreases the pain to some extent from a 9 down to 3 but ultimately the pain does recur. Review of Systems    Prior to Visit Medications    Medication Sig Taking?  Authorizing Provider   methocarbamol (ROBAXIN) 750 MG tablet Take 1 tablet by mouth every 8 hours as needed Yes Historical Provider, MD   ondansetron (ZOFRAN) 4 MG tablet Take 1 tablet by mouth as needed Yes Historical Provider, MD   tiZANidine (ZANAFLEX) 4 MG tablet take 1 tablet by mouth three times a day  Patient taking differently: take 1 tablet by mouth three times a day prn Yes Lin Vela MD   SUMAtriptan (IMITREX) 100 MG tablet Take 1 tablet by mouth once as needed for Migraine Yes Norris Low MD   albuterol sulfate  (90 Base) MCG/ACT inhaler Inhale 2 puffs into the lungs every 6 hours as needed for Wheezing or Shortness of Breath Yes Lin Vela MD   Handlatanya Salas MISC by Does not apply route Expires on 21 Yes Allen Souza MD   thyroid (ARMOUR) 130 MG tablet Take 1 tablet by mouth daily Yes Allen Souza MD   loratadine (CLARITIN) 10 MG tablet Take 1 tablet by mouth daily Yes Allen Souza MD   naproxen (NAPROSYN) 500 MG tablet Take 1 tablet by mouth 2 times daily (with meals) Yes Allen Souza MD   Calcium Carbonate-Vitamin D (OYSTER SHELL CALCIUM/D) 500-200 MG-UNIT TABS Take 1 tablet by mouth daily Yes Allen Souza MD   Misc. Devices (Dhiraj Liam) [address] [facility] Leonor Moellers with wheels Yes Allen Souza MD   Respiratory Therapy Supplies (BREATHERITE VALVED MDI CHAMBER) RICHARD To be used with inhaler Yes Adi Phelan MD   fentaNYL (DURAGESIC) 25 MCG/HR Place 1 patch onto the skin every 48 hours as needed.   Historical Provider, MD   amitriptyline (ELAVIL) 100 MG tablet Take 1 tablet by mouth nightly  Patient not taking: Reported on 2020  Ailyn Grewal MD   fluticasone (FLONASE) 50 MCG/ACT nasal spray 1 spray by Nasal route daily  Patient not taking: Reported on 2020  Allen Souza MD       Social History     Tobacco Use    Smoking status: Never Smoker    Smokeless tobacco: Never Used   Substance Use Topics    Alcohol use: No    Drug use: No        Allergies   Allergen Reactions    Latex Hives and Swelling    Nut [Peanut-Containing Drug Products] Nausea And Vomiting    Peanut (Diagnostic)      Other reaction(s): Nausea And Vomiting   ,   Past Medical History:   Diagnosis Date    Allergic rhinitis     Class 2 obesity due to excess calories without serious comorbidity with body mass index (BMI) of 35.0 to 35.9 in adult 6385    Complication of anesthesia     epidural stopped working    Hypothyroidism     Neuropathy     Osteoarthritis     POTS (postural orthostatic tachycardia syndrome)     Thyroid disease     Vasovagal syncope    ,   Past Surgical History:   Procedure Laterality Date     SECTION  4/11/10    EPIDURAL STEROID INJECTION Left 2019 EPIDURAL INJECTION s2 [facility] Bilateral 06/01/2018    bilat facet block no steroid    NERVE BLOCK  06/20/2019    S2 bupivacaine injection    SPINE SURGERY      sacral cysts and then several surgeries to correct spinal fluid leaks   ,   Social History     Tobacco Use    Smoking status: Never Smoker    Smokeless tobacco: Never Used   Substance Use Topics    Alcohol use: No    Drug use: No   ,   Family History   Problem Relation Age of Onset    Diabetes Paternal Grandmother     Heart Failure Maternal Grandmother     Heart Failure Maternal Grandfather     Diabetes Father        PHYSICAL EXAMINATION:  [ INSTRUCTIONS:  \"[x]\" Indicates a positive item  \"[]\" Indicates a negative item  -- DELETE ALL ITEMS NOT EXAMINED]  Vital Signs: (As obtained by patient/caregiver or practitioner observation)    Blood pressure-  Heart rate-    Respiratory rate-    Temperature-  Pulse oximetry-     Constitutional: [x] Appears well-developed and well-nourished [] No apparent distress      [] Abnormal-   Mental status  [x] Alert and awake  [x] Oriented to person/place/time [x]Able to follow commands      Eyes:  EOM    [x]  Normal  [] Abnormal-  Sclera  [x]  Normal  [] Abnormal -         Discharge [x]  None visible  [] Abnormal -    HENT:   [x] Normocephalic, atraumatic.   [] Abnormal   [x] Mouth/Throat: Mucous membranes are moist.     External Ears [x] Normal  [] Abnormal-     Neck: [x] No visualized mass     Pulmonary/Chest: [x] Respiratory effort normal.  [x] No visualized signs of difficulty breathing or respiratory distress        [] Abnormal-      Musculoskeletal:   [x] Normal gait with no signs of ataxia         [x] Normal range of motion of neck        [] Abnormal-       Neurological:        [x] No Facial Asymmetry (Cranial nerve 7 motor function) (limited exam to video visit)          [] No gaze palsy        [] Abnormal-         Skin:        [x] No significant exanthematous lesions or discoloration noted on facial skin         [] Abnormal-            Psychiatric:       [x] Normal Affect [x] No Hallucinations        [] Abnormal-     Other pertinent observable physical exam findings-     ASSESSMENT/PLAN:  Chiari I malformation    Extensive discussion with the patient regarding the imaging as well as her clinical pathology. She does have good CSF flow per the study but does have a distinct Chiari I malformation with descent below the foramen magnum of approximately 1 cm. I explained to the patient that she has mixed symptoms that correlate partially with Chiari and partially with other forms of headache. The distinct worsening with Valsalva coughing sneezing as well as numbness and tingling the upper extremities does seem to correlate. The patient appears to have progressive symptoms over the last 10 to 15 years. I did explain to her that per imaging I would typically not offer surgery but her symptoms have been progressive and do seem to correlate at least to some extent with a Chiari malformation. I explained her in detail that this is a very difficult surgery to recover from requiring least 2 night hospital stay in approximately 2 to 3 months of overall recovery. I explained her that there is a significant amount of suboccipital discomfort headache as well as severe nausea after the surgery. Oftentimes with opening of the dura and expansion duraplasty the patient can develop sometimes new symptoms with numbness tingling and extremity discomfort as well. I explained her that more than likely she will not have complete resolution of all of her headaches but some improvement in mainly the Chiari Valsalva component. The patient like to talk things over with her family and will call us with a decision    Ashley Garcia is a 31 y.o. female being evaluated by a Virtual Visit (video visit) encounter to address concerns as mentioned above.   A caregiver was present when appropriate. Due to this being a TeleHealth encounter (During [facility] public health emergency), evaluation of the following organ systems was limited: Vitals/Constitutional/EENT/Resp/CV/GI//MS/Neuro/Skin/Heme-Lymph-Imm. Pursuant to the emergency declaration under the [address] [place] [address], [address] [place] [address] and the [employer] Act, this Virtual Visit was conducted with patient's (and/or legal guardian's) consent, to reduce the patient's risk of exposure to COVID-19 and provide necessary medical care. The patient (and/or legal guardian) has also been advised to contact this office for worsening conditions or problems, and seek emergency medical treatment and/or call 911 if deemed necessary. Patient identification was verified at the start of the visit: Yes    Total time spent on this encounter: 15    Services were provided through a video synchronous discussion virtually to substitute for in-person clinic visit. Patient and provider were located at their individual homes. --Rashel Cole DO on 9/16/2020 at 8:59 AM    An electronic signature was used to authenticate this note.

## 2020-10-01 ENCOUNTER — HOSPITAL ENCOUNTER (OUTPATIENT)
Dept: MRI IMAGING | Age: 30
Discharge: HOME | End: 2020-10-03
Payer: MEDICARE

## 2020-10-01 PROCEDURE — 72195 MRI PELVIS W/O DYE: CPT

## 2020-10-01 PROCEDURE — 72148 MRI LUMBAR SPINE W/O DYE: CPT

## 2020-10-05 ENCOUNTER — PATIENT MESSAGE (OUTPATIENT)
Dept: NEUROSURGERY | Age: 30
End: 2020-10-05

## 2020-10-05 NOTE — TELEPHONE ENCOUNTER
From: Emi Eastamn  To: Miracle Cortés DO  Sent: 10/5/2020 11:58 AM EDT  Subject: Non-Urgent Medical Question    I had a 3 month post surgery mri of lumbar and sacral this past week. Attatched is a couple photos and the report is accessible in my file. Is it possible for you to give me your opinion and steps you would suggest in handling the fluid built up in the surgical site? I am sending a copy to the surgeon in [address] [place] [address] to review, but would appreciate your wisdom and suggestions. I do know that the surgical area has ballooned out and is no longer flat. Also I cannot lay flat on my back because when I get up the release of pressure brings on 'beyond intense' pain. This pain is mostly short lived but I avoid it at all costs because it is easily 9-10/10. (I can barely breathe, move or think during this pain- not trying to be dramatic, just informative)     What are the chances this is csf and it is the cause of increased headaches since surgery? If it is csf can it affect Chiari? If it is just post surgery fluid can/should it be drained? Scar was a light pink color but now can be purple-renee is that normal?     I hope you are well, thank you for taking the time to read my message.      Neftali Guy

## 2020-10-07 ENCOUNTER — OFFICE VISIT (OUTPATIENT)
Dept: NEUROSURGERY | Age: 30
End: 2020-10-07
Payer: MEDICARE

## 2020-10-07 VITALS
BODY MASS INDEX: 35.79 KG/M2 | HEIGHT: 70 IN | OXYGEN SATURATION: 99 % | WEIGHT: 250 LBS | DIASTOLIC BLOOD PRESSURE: 86 MMHG | TEMPERATURE: 96.8 F | HEART RATE: 75 BPM | SYSTOLIC BLOOD PRESSURE: 140 MMHG

## 2020-10-07 PROCEDURE — G8484 FLU IMMUNIZE NO ADMIN: HCPCS | Performed by: NEUROLOGICAL SURGERY

## 2020-10-07 PROCEDURE — 99213 OFFICE O/P EST LOW 20 MIN: CPT | Performed by: NEUROLOGICAL SURGERY

## 2020-10-07 PROCEDURE — G8427 DOCREV CUR MEDS BY ELIG CLIN: HCPCS | Performed by: NEUROLOGICAL SURGERY

## 2020-10-07 PROCEDURE — G8417 CALC BMI ABV UP PARAM F/U: HCPCS | Performed by: NEUROLOGICAL SURGERY

## 2020-10-07 PROCEDURE — 1036F TOBACCO NON-USER: CPT | Performed by: NEUROLOGICAL SURGERY

## 2020-10-07 NOTE — PROGRESS NOTES
[place]  [address] [facility] [facility] [address] [address]  Dept: [phone]    Patient:  Claudean Robin  YOB: 1990  Date: 10/7/20    The patient is a 31 y.o. female who presents today for consult of the following problems:     Chief Complaint   Patient presents with    Follow-up     Balloning at surgical site     Results     MRI Lumbar 10/01/20             HPI:     Claudean Robin is a 31 y.o. female on whom neurosurgical consultation was requested by Gabe Boxer, MD for management of pseudomeningocele. Patient underwent tylosis resection approximately 4 and half months ago with subsequent to take backs for pseudomeningocele is what sounds like CSF leak. Unfortunately course the last week she few weeks she is developed progressive symptoms including radiating pain numbness tingling the legs as well as excruciating incisional pain within the region. Symptoms are exacerbated when she lays flat on her back and puts pressure on the region. Also with distinct headaches that appear to worsen when she is upright and appear to resolve when she lays flat or improved significantly. No fevers chills nausea vomiting. Symptoms do not have any palliating factors. Progressive over time as well. Symptoms were similar to prior presentations when she required take back.   She has had complete resolution of bowel and bladder dysfunction in the interim    History:     Past Medical History:   Diagnosis Date    Allergic rhinitis     Class 2 obesity due to excess calories without serious comorbidity with body mass index (BMI) of 35.0 to 35.9 in adult 5862    Complication of anesthesia     epidural stopped working    Hypothyroidism     Neuropathy     Osteoarthritis     POTS (postural orthostatic tachycardia syndrome)     Thyroid disease     Vasovagal syncope      Past Surgical History:   Procedure Laterality Date     SECTION  4/11/10    EPIDURAL STEROID INJECTION Left 6/20/2019    EPIDURAL INJECTION s2 performed by Karlo Trinidad MD at [facility] Bilateral 06/01/2018    bilat facet block no steroid    NERVE BLOCK  06/20/2019    S2 bupivacaine injection    SPINE SURGERY      sacral cysts and then several surgeries to correct spinal fluid leaks     Family History   Problem Relation Age of Onset    Diabetes Paternal Grandmother     Heart Failure Maternal Grandmother     Heart Failure Maternal Grandfather     Diabetes Father      Current Outpatient Medications on File Prior to Visit   Medication Sig Dispense Refill    tiZANidine (ZANAFLEX) 4 MG tablet take 1 tablet by mouth three times a day (Patient taking differently: take 1 tablet by mouth three times a day prn) 60 tablet 3    SUMAtriptan (IMITREX) 100 MG tablet Take 1 tablet by mouth once as needed for Migraine 10 tablet 3    albuterol sulfate  (90 Base) MCG/ACT inhaler Inhale 2 puffs into the lungs every 6 hours as needed for Wheezing or Shortness of Breath 1 Inhaler 1    Handicap Placard MISC by Does not apply route Expires on 12/30/21 1 each 0    thyroid (ARMOUR) 130 MG tablet Take 1 tablet by mouth daily 90 tablet 3    loratadine (CLARITIN) 10 MG tablet Take 1 tablet by mouth daily 30 tablet 2    naproxen (NAPROSYN) 500 MG tablet Take 1 tablet by mouth 2 times daily (with meals) 120 tablet 3    Respiratory Therapy Supplies (BREATHERITE VALVED MDI CHAMBER) RICHARD To be used with inhaler 1 Device 1    fentaNYL (DURAGESIC) 25 MCG/HR Place 1 patch onto the skin every 48 hours as needed.  methocarbamol (ROBAXIN) 750 MG tablet Take 1 tablet by mouth every 8 hours as needed      ondansetron (ZOFRAN) 4 MG tablet Take 1 tablet by mouth as needed      amitriptyline (ELAVIL) 100 MG tablet Take 1 tablet by mouth nightly (Patient not taking: Reported on 10/7/2020) 30 tablet 3    Misc.  Devices (Elvira Dykes) [address] with wheels (Patient not taking: Reported on 10/7/2020) 1 each 0    fluticasone (FLONASE) 50 MCG/ACT nasal spray 1 spray by Nasal route daily (Patient not taking: Reported on 9/16/2020) 1 Bottle 3     No current facility-administered medications on file prior to visit. Social History     Tobacco Use    Smoking status: Never Smoker    Smokeless tobacco: Never Used   Substance Use Topics    Alcohol use: No    Drug use: No       Allergies   Allergen Reactions    Latex Hives and Swelling    Nut [Peanut-Containing Drug Products] Nausea And Vomiting    Peanut (Diagnostic)      Other reaction(s): Nausea And Vomiting       Review of Systems  Constitutional: Negative for activity change and appetite change. HENT: Negative for ear pain and facial swelling. Eyes: Negative for discharge and itching. Respiratory: Negative for choking and chest tightness. Cardiovascular: Negative for chest pain and leg swelling. Gastrointestinal: Negative for nausea and abdominal pain. Endocrine: Negative for cold intolerance and heat intolerance. Genitourinary: Negative for frequency and flank pain. Musculoskeletal: Negative for myalgias and joint swelling. Skin: Negative for rash and wound. Allergic/Immunologic: Negative for environmental allergies and food allergies. Hematological: Negative for adenopathy. Does not bruise/bleed easily. Psychiatric/Behavioral: Negative for self-injury. The patient is not nervous/anxious. Physical Exam:      BP (!) 140/86 (Site: Left Lower Arm, Position: Sitting, Cuff Size: Medium Adult)   Pulse 75   Temp 96.8 °F (36 °C) (Temporal)   Ht 5' 10\" (1.778 m)   Wt 250 lb (113.4 kg)   LMP 09/28/2020 (Approximate)   SpO2 99%   BMI 35.87 kg/m²   Estimated body mass index is 35.87 kg/m² as calculated from the following:    Height as of this encounter: 5' 10\" (1.778 m). Weight as of this encounter: 250 lb (113.4 kg). General:  Keenan Dial is a 31y.o. year old female who appears her stated age. considering that it is a contained lesion with no active CSF leak through the skin and no new deficits. However this is unfortunately a fairly complicated scenario considering the limited amount of tissue available for closure within the sacral region. MRI itself distinctly shows a dissected and  muscular plane and fascia as well as a thick subcutaneous layer that unfortunately will not be of much use for strength and closure due to it being predominantly adipose tissue. In my opinion this will require skilled plastics involvement for closure as well as prolonged bedrest for approximately 5 days along with a lumbar drain and likely prone bedrest if capable. As I discussed with the patient my main hesitation here as I do not have adequate plastics assistance in order to tackle this issue at this time. I have instructed her to reach out to her surgeon who is unfortunately out of the country at the time and will be returning within the span of the next week. In the interim I will attempt to find any plastics assistance locally that may be of use to me to see if we can address her issues here locally. In the event of your unsuccessful we will attempt to find a tertiary care referral to handle this problem. Followup: No follow-ups on file. Prescriptions Ordered:  No orders of the defined types were placed in this encounter. Orders Placed:  No orders of the defined types were placed in this encounter. Electronically signed by Ramon Anderson DO on 10/7/2020 at 9:12 AM    Please note that this chart was generated using voice recognition Dragon dictation software. Although every effort was made to ensure the accuracy of this automated transcription, some errors in transcription may have occurred.

## 2020-10-14 ENCOUNTER — OFFICE VISIT (OUTPATIENT)
Dept: NEUROSURGERY | Age: 30
End: 2020-10-14
Payer: MEDICARE

## 2020-10-14 ENCOUNTER — HOSPITAL ENCOUNTER (OUTPATIENT)
Age: 30
Setting detail: SPECIMEN
Discharge: HOME | End: 2020-10-14
Payer: MEDICARE

## 2020-10-14 VITALS
TEMPERATURE: 97.3 F | HEIGHT: 70 IN | BODY MASS INDEX: 35.79 KG/M2 | DIASTOLIC BLOOD PRESSURE: 81 MMHG | HEART RATE: 104 BPM | SYSTOLIC BLOOD PRESSURE: 121 MMHG | OXYGEN SATURATION: 95 % | WEIGHT: 250 LBS

## 2020-10-14 PROCEDURE — G8484 FLU IMMUNIZE NO ADMIN: HCPCS | Performed by: NEUROLOGICAL SURGERY

## 2020-10-14 PROCEDURE — 99213 OFFICE O/P EST LOW 20 MIN: CPT | Performed by: NEUROLOGICAL SURGERY

## 2020-10-14 PROCEDURE — G8427 DOCREV CUR MEDS BY ELIG CLIN: HCPCS | Performed by: NEUROLOGICAL SURGERY

## 2020-10-14 PROCEDURE — G8417 CALC BMI ABV UP PARAM F/U: HCPCS | Performed by: NEUROLOGICAL SURGERY

## 2020-10-14 PROCEDURE — 1036F TOBACCO NON-USER: CPT | Performed by: NEUROLOGICAL SURGERY

## 2020-10-14 NOTE — PROGRESS NOTES
[place]  [address] [facility] [address] [facility] [address]  Dept: [phone]    Patient:  Lincoln Salazar  YOB: 1990  Date: 10/14/20    The patient is a 31 y.o. female who presents today for consult of the following problems:     Chief Complaint   Patient presents with    Procedure     lumbar drain             HPI:     Lincoln Salazar is a 31 y.o. female on whom neurosurgical consultation was requested by Ana Wolff MD for management of headache along with worsening with doing things over the course of the day. Burning sensation in the legs as well. When she lays down and suddenly gets up slowly or sudden gets severe back pain along with HA with sudden pressure release. Per the request of her original surgeon I did agree to aspirate the fluid pocket using a very oblique L side trajectory also employing seldinger technique.        History:     Past Medical History:   Diagnosis Date    Allergic rhinitis     Class 2 obesity due to excess calories without serious comorbidity with body mass index (BMI) of 35.0 to 35.9 in adult 6055    Complication of anesthesia     epidural stopped working    Hypothyroidism     Neuropathy     Osteoarthritis     POTS (postural orthostatic tachycardia syndrome)     Thyroid disease     Vasovagal syncope      Past Surgical History:   Procedure Laterality Date     SECTION  4/11/10    EPIDURAL STEROID INJECTION Left 2019    EPIDURAL INJECTION s2 performed by Leticia Fuentes MD at [facility] Bilateral 2018    bilat facet block no steroid    NERVE BLOCK  2019    S2 bupivacaine injection    SPINE SURGERY      sacral cysts and then several surgeries to correct spinal fluid leaks     Family History   Problem Relation Age of Onset    Diabetes Paternal Grandmother     Heart Failure Maternal Grandmother     Heart Failure Maternal Grandfather     Diabetes Father Current Outpatient Medications on File Prior to Visit   Medication Sig Dispense Refill    methocarbamol (ROBAXIN) 750 MG tablet Take 1 tablet by mouth every 8 hours as needed      ondansetron (ZOFRAN) 4 MG tablet Take 1 tablet by mouth as needed      amitriptyline (ELAVIL) 100 MG tablet Take 1 tablet by mouth nightly 30 tablet 3    tiZANidine (ZANAFLEX) 4 MG tablet take 1 tablet by mouth three times a day (Patient taking differently: take 1 tablet by mouth three times a day prn) 60 tablet 3    albuterol sulfate  (90 Base) MCG/ACT inhaler Inhale 2 puffs into the lungs every 6 hours as needed for Wheezing or Shortness of Breath 1 Inhaler 1    Handicap Placard MISC by Does not apply route Expires on 12/30/21 1 each 0    thyroid (ARMOUR) 130 MG tablet Take 1 tablet by mouth daily 90 tablet 3    loratadine (CLARITIN) 10 MG tablet Take 1 tablet by mouth daily 30 tablet 2    naproxen (NAPROSYN) 500 MG tablet Take 1 tablet by mouth 2 times daily (with meals) 120 tablet 3    Misc. Devices (ROLLER WALKER) MISC Walker with wheels 1 each 0    fluticasone (FLONASE) 50 MCG/ACT nasal spray 1 spray by Nasal route daily 1 Bottle 3    Respiratory Therapy Supplies (BREATHERITE VALVED MDI CHAMBER) RICHARD To be used with inhaler 1 Device 1    fentaNYL (DURAGESIC) 25 MCG/HR Place 1 patch onto the skin every 48 hours as needed.  SUMAtriptan (IMITREX) 100 MG tablet Take 1 tablet by mouth once as needed for Migraine 10 tablet 3     No current facility-administered medications on file prior to visit.       Social History     Tobacco Use    Smoking status: Never Smoker    Smokeless tobacco: Never Used   Substance Use Topics    Alcohol use: No    Drug use: No       Allergies   Allergen Reactions    Latex Hives and Swelling    Nut [Peanut-Containing Drug Products] Nausea And Vomiting    Peanut (Diagnostic)      Other reaction(s): Nausea And Vomiting       Review of Systems  Constitutional: Negative for activity change and appetite change. HENT: Negative for ear pain and facial swelling. Eyes: Negative for discharge and itching. Respiratory: Negative for choking and chest tightness. Cardiovascular: Negative for chest pain and leg swelling. Gastrointestinal: Negative for nausea and abdominal pain. Endocrine: Negative for cold intolerance and heat intolerance. Genitourinary: Negative for frequency and flank pain. Musculoskeletal: Negative for myalgias and joint swelling. Skin: Negative for rash and wound. Allergic/Immunologic: Negative for environmental allergies and food allergies. Hematological: Negative for adenopathy. Does not bruise/bleed easily. Psychiatric/Behavioral: Negative for self-injury. The patient is not nervous/anxious. Physical Exam:      /81 (Site: Right Upper Arm, Position: Sitting, Cuff Size: Large Adult)   Pulse 104   Temp 97.3 °F (36.3 °C) (Temporal)   Ht 5' 10\" (1.778 m)   Wt 250 lb (113.4 kg)   LMP 09/28/2020 (Approximate)   SpO2 95%   BMI 35.87 kg/m²   Estimated body mass index is 35.87 kg/m² as calculated from the following:    Height as of this encounter: 5' 10\" (1.778 m). Weight as of this encounter: 250 lb (113.4 kg). General:  Latisha Oconnor is a 31y.o. year old female who appears her stated age. HEENT: Normocephalic atraumatic. Neck supple. Chest: regular rate; pulses equal  Abdomen: Soft nontender nondistended. Normoactive bowel sounds.   Ext: DP and PT pulses 2+, good cap refill  Neuro    Mentation  Appropriate affect  Registration intact  Orientation intact  3 item recall intact  Judgement intact to situation    Cranial Nerves:   Pupils equal and reactive to light  Extraocular motion intact  Face and shrug symmetric  Tongue midline  No dysarthria  v1-3 sensation symmetric, masseter tone symmetric  Hearing symmetric and intact to finger rub    Sensation:   intact    Motor  L deltoid 5/5; R deltoid 5/5  L biceps 5/5; R biceps 5/5  L triceps 5/5; R triceps 5/5  L wrist extension 5/5; R wrist extension 5/5  L intrinsics 5/5; R intrinsics 5/5     L iliopsoas 5/5 , R iliopsoas 5/5  L quadriceps 5/5; R quadriceps 5/5  L Dorsiflexion 5/5; R dorsiflexion 5/5  L Plantarflexion 5/5; R plantarflexion 5/5  L EHL 5/5; R EHL 5/5    Reflexes  L Brachioradialis 2+/4; R brachioradialis 2+/4  L Biceps 2+/4; R Biceps 2+/4  L Triceps 2+/4; R Triceps 2+/4  L Patellar 2+/4: R Patellar 2+/4  L Achilles 2+/4; R Achilles 2+/4    hoffmans L: neg  hoffmans R: neg  Clonus L: neg  Clonus R: neg  Babinski L: up  Babinski R; up    Studies Review:     No new    Assessment and Plan:      1. Pseudomeningocele, acquired          Plan: using sterile methods - aspiration of sacral subq collection performed and sent for beta 2 transferrin. Will update her surgeon once completed. Followup: No follow-ups on file. Prescriptions Ordered:  No orders of the defined types were placed in this encounter. Orders Placed:  No orders of the defined types were placed in this encounter. Electronically signed by Richie Borrero DO on 10/14/2020 at 12:38 PM    Please note that this chart was generated using voice recognition Dragon dictation software. Although every effort was made to ensure the accuracy of this automated transcription, some errors in transcription may have occurred.

## 2020-10-16 LAB — BETA-2 TRANSFERRIN FLUID: NOT DETECTED

## 2020-10-23 ENCOUNTER — PREP FOR PROCEDURE (OUTPATIENT)
Dept: GENERAL RADIOLOGY | Age: 30
End: 2020-10-23

## 2020-10-23 RX ORDER — SODIUM CHLORIDE 9 MG/ML
INJECTION, SOLUTION INTRAVENOUS CONTINUOUS
Status: CANCELLED | OUTPATIENT
Start: 2020-10-23

## 2020-10-26 ENCOUNTER — HOSPITAL ENCOUNTER (OUTPATIENT)
Dept: ULTRASOUND IMAGING | Age: 30
Discharge: HOME | End: 2020-10-28
Payer: MEDICARE

## 2020-10-26 VITALS
RESPIRATION RATE: 16 BRPM | HEIGHT: 70 IN | BODY MASS INDEX: 35.79 KG/M2 | SYSTOLIC BLOOD PRESSURE: 128 MMHG | WEIGHT: 250 LBS | DIASTOLIC BLOOD PRESSURE: 78 MMHG | OXYGEN SATURATION: 100 % | TEMPERATURE: 98.6 F | HEART RATE: 87 BPM

## 2020-10-26 LAB
INR BLD: 0.9
PARTIAL THROMBOPLASTIN TIME: 23.7 SEC (ref 20.5–30.5)
PLATELET # BLD: 235 K/UL (ref 138–453)
PROTHROMBIN TIME: 9.6 SEC (ref 9–12)

## 2020-10-26 PROCEDURE — 85610 PROTHROMBIN TIME: CPT

## 2020-10-26 PROCEDURE — 7100000010 HC PHASE II RECOVERY - FIRST 15 MIN

## 2020-10-26 PROCEDURE — 2580000003 HC RX 258: Performed by: PHYSICIAN ASSISTANT

## 2020-10-26 PROCEDURE — 76942 ECHO GUIDE FOR BIOPSY: CPT

## 2020-10-26 PROCEDURE — 2709999900 US SOFT TISSUE ABSCESS DRAINAGE PERC

## 2020-10-26 PROCEDURE — 85730 THROMBOPLASTIN TIME PARTIAL: CPT

## 2020-10-26 PROCEDURE — 85049 AUTOMATED PLATELET COUNT: CPT

## 2020-10-26 PROCEDURE — 7100000011 HC PHASE II RECOVERY - ADDTL 15 MIN

## 2020-10-26 RX ORDER — ACETAMINOPHEN 325 MG/1
650 TABLET ORAL EVERY 4 HOURS PRN
Status: DISCONTINUED | OUTPATIENT
Start: 2020-10-26 | End: 2020-10-29 | Stop reason: HOSPADM

## 2020-10-26 RX ORDER — SODIUM CHLORIDE 9 MG/ML
INJECTION, SOLUTION INTRAVENOUS CONTINUOUS
Status: DISCONTINUED | OUTPATIENT
Start: 2020-10-26 | End: 2020-10-29 | Stop reason: HOSPADM

## 2020-10-26 RX ADMIN — SODIUM CHLORIDE: 9 INJECTION, SOLUTION INTRAVENOUS at 12:16

## 2020-10-26 ASSESSMENT — PAIN - FUNCTIONAL ASSESSMENT: PAIN_FUNCTIONAL_ASSESSMENT: 0-10

## 2020-10-26 NOTE — PROGRESS NOTES
1430: drain sight clean, dry & intact. Informed discharge instructions. No questions at this time.   Electronically signed by Luis Delacruz RN on 10/26/20 at 3:24 PM EDT

## 2020-10-26 NOTE — H&P
History and Physical    Pt Name: Puneet Estrada  MRN: [id number]  YOB: 1990  Date of evaluation: 10/26/2020  Primary Care Physician: Adriana Torres MD    SUBJECTIVE:   History of Chief Complaint:    Puneet Estrada is a 31 y.o. female who is scheduled today for lumbar drain placement. She complains of lumbar seroma. She reports history of tarlov cysts that she had surgically addressed with a specialist in [place], along with 2 spinal post operative leaks. She rates her current lumbar pain a 2/10, aggravated when lying down and position changes. Also reports legs burning. Allergies  is allergic to latex; other; nut [peanut-containing drug products]; and peanut (diagnostic). Medications  Prior to Admission medications    Medication Sig Start Date End Date Taking? Authorizing Provider   fentaNYL (DURAGESIC) 25 MCG/HR Place 1 patch onto the skin every 48 hours as needed.  6/16/20   Historical Provider, MD   methocarbamol (ROBAXIN) 750 MG tablet Take 1 tablet by mouth every 8 hours as needed 6/16/20   Historical Provider, MD   ondansetron (ZOFRAN) 4 MG tablet Take 1 tablet by mouth as needed 6/16/20   Historical Provider, MD   amitriptyline (ELAVIL) 100 MG tablet Take 1 tablet by mouth nightly 6/24/20   Gwen Gomez MD   tiZANidine (ZANAFLEX) 4 MG tablet take 1 tablet by mouth three times a day  Patient taking differently: take 1 tablet by mouth three times a day prn 5/26/20   Adriana Torres MD   SUMAtriptan (IMITREX) 100 MG tablet Take 1 tablet by mouth once as needed for Migraine 1/7/20 10/7/20  Gwen Gomez MD   albuterol sulfate  (90 Base) MCG/ACT inhaler Inhale 2 puffs into the lungs every 6 hours as needed for Wheezing or Shortness of Breath 12/18/19   Adriana Torres MD   Handicap Placard MISC by Does not apply route Expires on 12/30/21 12/18/19   Adriana Torres MD   thyroid (ARMOUR) 130 MG tablet Take 1 tablet by mouth daily 9/6/19   Adriana Torres MD   loratadine (CLARITIN) 10 MG tablet CONSTITUTIONAL:Alert and orientated to person, place and time. No acute distress. Friendly. Very pleasant. SKIN:  Warm & dry, no rashes on exposed skin  HEENT: HEAD: Normocephalic, atraumatic        EYES:  PERRL, EOMs intact, conjunctiva clear, wearing glasses       EARS:  Equal bilaterally, no edema or thickening, skin is intact without lumps or lesions. No discharge. NOSE:  Nares patent, septum midline, no rhinorrhea      MOUTH/THROAT:  Mucous membranes moist, tongue is pink, uvula midline, teeth appear to be intact  NECK:  Supple, no lymphadenopathy, full ROM  LUNGS: Respirations even and non-labored. Clear to auscultation bilaterally, no wheezes/rales/rhonchi   CARDIOVASCULAR: regular rate and rhythm, no murmurs/rubs/gallops   ABDOMEN: soft, non-tender, non-distended, bowel sounds active x 4   MUSCULOSKELETAL: Full ROM bilateral upper extremities, Full ROM bilateral lower extremities. Strength of 5/5 bilateral upper extremities. Strength 5/5 bilateral lower extremities. VASCULAR:  Brisk cap refill bilateral fingers. Radial pulses are intact, 2+ bilaterally. Dorsalis pedis pulse 2+ bilaterally. No edema or varicosities bilateral lower extremities  NEUROLOGIC: CN II-XII are grossly intact. Gait not assessed. IMPRESSIONS:   Post-operative seroma      Diagnosis Date    Allergic rhinitis     Class 2 obesity due to excess calories without serious comorbidity with body mass index (BMI) of 35.0 to 35.9 in adult 2/8/5509    Complication of anesthesia     epidural stopped working    History of Chiari malformation     Hypothyroidism     Migraines     Neuropathy     Osteoarthritis     POTS (postural orthostatic tachycardia syndrome)     Psoriasis     RAD (reactive airway disease)     triggered by bleach only    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     [place]    Thyroid disease     Vasovagal syncope     Wears glasses    1.    PLANS:   Lumbar drain     MONICA  MONTANO APRN-CNP  Electronically signed 10/26/2020 at 12:52 PM

## 2020-10-26 NOTE — BRIEF OP NOTE
Brief Postoperative Note    Laura Morales  YOB: 1990  [id number]    Pre-operative Diagnosis: Lumbosacral seroma    Post-operative Diagnosis: Same    Procedure: Seroma drain    Anesthesia: Local    Surgeons/Assistants: Izabel Tim MD and ABDIEL Iniguez    Estimated Blood Loss: less than 50     Complications: None    Specimens: Was Obtained:     Findings: Successful placement of 8 fr pigtail drain in lumbosacral seroma. Approximately 250 mL of yellow fluid was aspirated. POPEYE bulb attached.      Electronically signed by ABDIEL Donato on 10/26/2020 at 2:42 PM

## 2020-10-28 ENCOUNTER — OFFICE VISIT (OUTPATIENT)
Dept: NEUROSURGERY | Age: 30
End: 2020-10-28
Payer: MEDICARE

## 2020-10-28 ENCOUNTER — TELEPHONE (OUTPATIENT)
Dept: NEUROSURGERY | Age: 30
End: 2020-10-28

## 2020-10-28 VITALS
HEIGHT: 70 IN | OXYGEN SATURATION: 99 % | HEART RATE: 85 BPM | DIASTOLIC BLOOD PRESSURE: 69 MMHG | WEIGHT: 250 LBS | BODY MASS INDEX: 35.79 KG/M2 | SYSTOLIC BLOOD PRESSURE: 118 MMHG

## 2020-10-28 PROCEDURE — G8427 DOCREV CUR MEDS BY ELIG CLIN: HCPCS | Performed by: NURSE PRACTITIONER

## 2020-10-28 PROCEDURE — G8417 CALC BMI ABV UP PARAM F/U: HCPCS | Performed by: NURSE PRACTITIONER

## 2020-10-28 PROCEDURE — 99213 OFFICE O/P EST LOW 20 MIN: CPT | Performed by: NURSE PRACTITIONER

## 2020-10-28 PROCEDURE — G8484 FLU IMMUNIZE NO ADMIN: HCPCS | Performed by: NURSE PRACTITIONER

## 2020-10-28 PROCEDURE — 1036F TOBACCO NON-USER: CPT | Performed by: NURSE PRACTITIONER

## 2020-10-28 NOTE — TELEPHONE ENCOUNTER
I gave the rep an order for the brace while he was here in the office fitting her-is there something different that is needed?

## 2020-10-28 NOTE — TELEPHONE ENCOUNTER
[facility] office called - they need an order faxed over to [phone] for adonis;s brace they fitted her with today

## 2020-10-28 NOTE — PATIENT INSTRUCTIONS
Patient Education        Surgical Drain Care: Care Instructions  Your Care Instructions     After a surgery, fluid may collect inside your body in the surgical area. This makes an infection or other problems more likely. A surgical drain allows the fluid to flow out. The doctor puts a thin, flexible rubber tube into the area of your body where the fluid is likely to collect. The rubber tube carries the fluid outside your body. The most common type of surgical drain carries the fluid into a collection bulb that you empty. This is called a Johnathan-Ansari (POPEYE) drain. The drain uses suction created by the bulb to pull the fluid from your body into the bulb. The rubber tube will probably be held in place by one or two stitches in your skin. The bulb will probably be attached with a safety pin to your clothes or near the bandage so that it doesn't flip around or pull on the stitches. Another type of drain is called a Penrose drain. This type of drain doesn't have a bulb. Instead, the end of the tube is open. That allows the fluid to drain onto a dressing taped to your skin. The drain may be kept in place next to your skin with a stitch or a safety pin in the tube. When you first get the drain, the fluid will be bloody. It will change color from red to pink to a light yellow or clear as the wound heals and the fluid starts to go away. Your doctor may give you information on when you no longer need the drain and when it will be removed. Follow-up care is a key part of your treatment and safety. Be sure to make and go to all appointments, and call your doctor if you are having problems. It's also a good idea to know your test results and keep a list of the medicines you take. How do you empty the bulb of a Johnathan-Ansari drain? Follow any instructions your doctor gives you. How often you empty the bulb depends on how much fluid is draining. Empty the bulb when it is half full.   1. Wash your hands with soap and water.  2. Take the plug out of the bulb. 3. Empty the bulb. If your doctor asks you to measure the fluid, empty the fluid into a measuring cup, and write down the color and how much you collected. Your doctor will want to know this information. 4. Clean the plug with alcohol. 5. Squeeze the bulb until it is flat. This removes all the air from the bulb. You may need to put the bulb on a table or a counter to flatten it. 6. Keep the bulb flat, and put the plug in. The bulb should stay flat after you put the plug back in. This creates the suction that pulls the fluid into the bulb. 7. Empty the fluid into the toilet. 8. Wash your hands. How do you change the dressing around your surgical drain? You may have a dressing (bandage). The dressing is often made of gauze pads held on with tape. Your doctor will tell you how often to change it. 1. Wash your hands with soap and water. 2. Take off the dressing from around the drain. 3. Clean the drain site and the skin around it with soap and water. Use gauze or a cotton swab. 4. When the site is dry, put on a new dressing. The way your dressing is put on depends on what kind of drain you have. You will get instructions for your type of drain. 5. Wash your hands again with soap and water. Your doctor may ask you to keep track of your dressing changes. Write down the time of day and the amount and color of the fluid on the dressing. How do you help prevent clogs in your surgical drain? Squeezing or \"milking\" the tube of your surgical drain can help prevent clogs so that it drains correctly. Your doctor will tell you when you need to do this. In general, you do this when:  · You see a clot in the tube that prevents fluid from draining. The clot may look like a dark, stringy lining. · You see fluid leaking around the tube where it goes into the skin. Follow these steps for milking the tube.   1. Use one hand to hold and pinch the tube where it leaves the

## 2020-10-28 NOTE — PROGRESS NOTES
keysha Correia  [facility] [address]  [address] [place] [id number]  Dept: [phone]    Patient:  Jarad Hairston  YOB: 1990  Date: 10/28/20    The patient is a 31 y.o. female who presents today for consult of the following problems:     Chief Complaint   Patient presents with    Follow-up       HPI:     Jarad Hairston is a 31 y.o. female who presents to the office following drain placement for postoperative seroma over the lumbosacral region. Drain has been putting out approximately 50 mL twice daily for the last 2 days that is been in place. Orthotic rep here to fit patient for brace to compress seroma site to prevent reaccumulation of fluid. Patient denies any fevers or chills. Drain site is appropriately dressed. Serous fluid in bulb.   Drains about 50mL in the AM and PM.    History:     Past Medical History:   Diagnosis Date    Allergic rhinitis     Class 2 obesity due to excess calories without serious comorbidity with body mass index (BMI) of 35.0 to 35.9 in adult 792    Complication of anesthesia     epidural stopped working    History of Chiari malformation     Hypothyroidism     Migraines     Neuropathy     Osteoarthritis     POTS (postural orthostatic tachycardia syndrome)     Psoriasis     RAD (reactive airway disease)     triggered by bleach only    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     [place]    Thyroid disease     Vasovagal syncope     Wears glasses      Past Surgical History:   Procedure Laterality Date     SECTION  4/11/10    EPIDURAL STEROID INJECTION Left 2019    EPIDURAL INJECTION s2 performed by Bere [facility]o [facility] at [facility] Bilateral 2018    bilat facet block no steroid    NERVE BLOCK  2019    S2 bupivacaine injection    SPINE SURGERY      sacral cysts and then several surgeries to correct spinal fluid leaks    US DRAIN SOFT TISSUE ABSCESS  10/26/2020    US DRAIN SOFT TISSUE ABSCESS 10/26/2020 [facility]     Family History   Problem Relation Age of Onset    Diabetes Paternal Grandmother     Heart Failure Maternal Grandmother     Heart Failure Maternal Grandfather     Diabetes Father      Current Outpatient Medications on File Prior to Visit   Medication Sig Dispense Refill    methocarbamol (ROBAXIN) 750 MG tablet Take 1 tablet by mouth every 8 hours as needed      ondansetron (ZOFRAN) 4 MG tablet Take 1 tablet by mouth as needed      amitriptyline (ELAVIL) 100 MG tablet Take 1 tablet by mouth nightly 30 tablet 3    tiZANidine (ZANAFLEX) 4 MG tablet take 1 tablet by mouth three times a day (Patient taking differently: take 1 tablet by mouth three times a day prn) 60 tablet 3    albuterol sulfate  (90 Base) MCG/ACT inhaler Inhale 2 puffs into the lungs every 6 hours as needed for Wheezing or Shortness of Breath 1 Inhaler 1    Handicap Placard MISC by Does not apply route Expires on 12/30/21 1 each 0    thyroid (ARMOUR) 130 MG tablet Take 1 tablet by mouth daily 90 tablet 3    loratadine (CLARITIN) 10 MG tablet Take 1 tablet by mouth daily 30 tablet 2    naproxen (NAPROSYN) 500 MG tablet Take 1 tablet by mouth 2 times daily (with meals) 120 tablet 3    Misc. Devices (ROLLER WALKER) MISC Walker with wheels 1 each 0    fluticasone (FLONASE) 50 MCG/ACT nasal spray 1 spray by Nasal route daily 1 Bottle 3    Respiratory Therapy Supplies (BREATHERITE VALVED MDI CHAMBER) RICHARD To be used with inhaler 1 Device 1    fentaNYL (DURAGESIC) 25 MCG/HR Place 1 patch onto the skin every 48 hours as needed.       SUMAtriptan (IMITREX) 100 MG tablet Take 1 tablet by mouth once as needed for Migraine 10 tablet 3     Current Facility-Administered Medications on File Prior to Visit   Medication Dose Route Frequency Provider Last Rate Last Dose    0.9 % sodium chloride infusion   Intravenous Continuous Elenaradha Weiner [place], [place]   Stopped at 10/26/20 1506    acetaminophen (TYLENOL) tablet 650 mg  650 mg Oral Q4H PRN ABDIEL Borjas         Social History     Tobacco Use    Smoking status: Never Smoker    Smokeless tobacco: Never Used   Substance Use Topics    Alcohol use: No    Drug use: No       Allergies   Allergen Reactions    Latex Hives and Swelling    Other Shortness Of Breath     Bleach causes reactive airway disease    Nut [Peanut-Containing Drug Products] Nausea And Vomiting    Peanut (Diagnostic)      Other reaction(s): Nausea And Vomiting       Review of Systems  Constitutional: Negative for activity change and appetite change. HENT: Negative for ear pain and facial swelling. Eyes: Negative for discharge and itching. Respiratory: Negative for choking and chest tightness. Cardiovascular: Negative for chest pain and leg swelling. Gastrointestinal: Negative for nausea and abdominal pain. Endocrine: Negative for cold intolerance and heat intolerance. Genitourinary: Negative for frequency and flank pain. Musculoskeletal: Negative for myalgias and joint swelling. Skin: Negative for rash and wound. Allergic/Immunologic: Negative for environmental allergies and food allergies. Hematological: Negative for adenopathy. Does not bruise/bleed easily. Psychiatric/Behavioral: Negative for self-injury. The patient is not nervous/anxious. Physical Exam:      /69 (Site: Right Lower Arm, Position: Sitting, Cuff Size: Medium Adult)   Pulse 85   Ht 5' 10\" (1.778 m)   Wt 250 lb (113.4 kg)   LMP 09/28/2020 (Approximate)   SpO2 99%   BMI 35.87 kg/m²   Estimated body mass index is 35.87 kg/m² as calculated from the following:    Height as of this encounter: 5' 10\" (1.778 m). Weight as of this encounter: 250 lb (113.4 kg). General:  Yecenia Vela is a 31y.o. year old female who appears her stated age. HEENT: Normocephalic atraumatic. Neck supple.   Chest: regular rate; pulses equal  Abdomen: Soft nontender nondistended. Ext: DP and PT pulses 2+, good cap refill  Neuro    Mentation  Appropriate affect  Registration intact  Orientation intact  3 item recall intact  Judgement intact to situation    Cranial Nerves:   Pupils equal and reactive to light  Extraocular motion intact  Face and shrug symmetric  Tongue midline  No dysarthria  v1-3 sensation symmetric, masseter tone symmetric  Hearing symmetric    Sensation: Intact    Motor  L deltoid 5/5; R deltoid 5/5  L biceps 5/5; R biceps 5/5  L triceps 5/5; R triceps 5/5  L wrist extension 5/5; R wrist extension 5/5  L intrinsics 5/5; R intrinsics 5/5     L iliopsoas 5/5 , R iliopsoas 5/5  L quadriceps 5/5; R quadriceps 5/5  L Dorsiflexion 5/5; R dorsiflexion 5/5  L Plantarflexion 5/5; R plantarflexion 5/5  L EHL 5/5; R EHL 5/5    Reflexes  L Brachioradialis 2+/4; R brachioradialis 2+/4  L Biceps 2+/4; R Biceps 2+/4  L Triceps 2+/4; R Triceps 2+/4  L Patellar 2+/4: R Patellar 2+/4  L Achilles 2+/4; R Achilles 2+/4    hoffmans L: neg  hoffmans R: neg  Clonus L: neg  Clonus R: neg  Babinski L: neg  Babinski R: neg    Studies Review:     No new imaging    Assessment and Plan:      1. Postoperative seroma involving nervous system after nervous system procedure          Plan: Patient properly fitted for compression brace. To remain in place for the next 2 to 3 weeks. Continue to monitor POPEYE drainage, provided additional information regarding POPEYE site care. Will have patient return to the office in 1 week for drain monitoring. Followup: Return in about 1 week (around 11/4/2020), or if symptoms worsen or fail to improve. Prescriptions Ordered:  No orders of the defined types were placed in this encounter.        Orders Placed:  Orders Placed This Encounter   Procedures    MN SIO FLEX PELVIC/SACR PRE OTS     Standing Status:   Future     Standing Expiration Date:   1/26/2021        Electronically signed by DEAN Crook CNP

## 2020-11-04 ENCOUNTER — OFFICE VISIT (OUTPATIENT)
Dept: NEUROSURGERY | Age: 30
End: 2020-11-04
Payer: MEDICARE

## 2020-11-04 VITALS
OXYGEN SATURATION: 97 % | TEMPERATURE: 95 F | DIASTOLIC BLOOD PRESSURE: 76 MMHG | WEIGHT: 250 LBS | HEART RATE: 86 BPM | BODY MASS INDEX: 35.79 KG/M2 | HEIGHT: 70 IN | SYSTOLIC BLOOD PRESSURE: 127 MMHG

## 2020-11-04 PROCEDURE — 99213 OFFICE O/P EST LOW 20 MIN: CPT | Performed by: NEUROLOGICAL SURGERY

## 2020-11-04 PROCEDURE — G8484 FLU IMMUNIZE NO ADMIN: HCPCS | Performed by: NEUROLOGICAL SURGERY

## 2020-11-04 PROCEDURE — G8417 CALC BMI ABV UP PARAM F/U: HCPCS | Performed by: NEUROLOGICAL SURGERY

## 2020-11-04 PROCEDURE — G8427 DOCREV CUR MEDS BY ELIG CLIN: HCPCS | Performed by: NEUROLOGICAL SURGERY

## 2020-11-04 PROCEDURE — 1036F TOBACCO NON-USER: CPT | Performed by: NEUROLOGICAL SURGERY

## 2020-11-04 RX ORDER — CEPHALEXIN 500 MG/1
500 CAPSULE ORAL 3 TIMES DAILY
Qty: 15 CAPSULE | Refills: 0 | Status: SHIPPED | OUTPATIENT
Start: 2020-11-04 | End: 2020-11-09

## 2020-11-04 NOTE — PROGRESS NOTES
Sydnee Correia  [facility] [address]  [address]  Dept: [phone]    Patient:  Dheeraj Yanes  YOB: 1990  Date: 11/4/20    The patient is a 31 y.o. female who presents today for consult of the following problems:     Chief Complaint   Patient presents with    Post-Op Check             HPI:     Dheeraj Yanes is a 31 y.o. female on whom neurosurgical consultation was requested by Allie Alejo MD for management of postoperative lumbosacral seroma. Patient states that the drainage has significantly tapered off over the course of the last 9 days. Initially with significant output to 50 cc removed at the time of plain placement. Since that time significant drop off of the last 3 days being approximately 30 to 35 cc/day. Fluid appears to be a little bit more cloudy over the last day or 2 but she denies any fevers chills nausea vomiting meningismus of any kind. She did state that she has had approximately 4-5 migraines in the last week where her typical frequency is approximately 2 and the same duration. No significant postural changes. She did have initial radiculopathic symptoms immediately after placement this appears to have resolved in the interim. She has been avoiding supine laying or putting any pressure in the lumbar region so is unable to relay any improvement in the primary symptoms originally which have presented. No symptoms initially were that any pressure on the lumbosacral region with direct supine lying with trigger a significant lot of axial discomfort and pain in time she arose or decompressed that region.       History:     Past Medical History:   Diagnosis Date    Allergic rhinitis     Class 2 obesity due to excess calories without serious comorbidity with body mass index (BMI) of 35.0 to 35.9 in adult 2/0/0185    Complication of anesthesia     epidural stopped working    History of Chiari malformation     1 tablet by mouth daily 30 tablet 2    naproxen (NAPROSYN) 500 MG tablet Take 1 tablet by mouth 2 times daily (with meals) 120 tablet 3    Misc. Devices (ROLLER WALKER) MISC Walker with wheels 1 each 0    fluticasone (FLONASE) 50 MCG/ACT nasal spray 1 spray by Nasal route daily 1 Bottle 3    Respiratory Therapy Supplies (BREATHERITE VALVED MDI CHAMBER) RICHARD To be used with inhaler 1 Device 1    fentaNYL (DURAGESIC) 25 MCG/HR Place 1 patch onto the skin every 48 hours as needed.  SUMAtriptan (IMITREX) 100 MG tablet Take 1 tablet by mouth once as needed for Migraine 10 tablet 3     No current facility-administered medications on file prior to visit. Social History     Tobacco Use    Smoking status: Never Smoker    Smokeless tobacco: Never Used   Substance Use Topics    Alcohol use: No    Drug use: No       Allergies   Allergen Reactions    Latex Hives and Swelling    Other Shortness Of Breath     Bleach causes reactive airway disease    Nut [Peanut-Containing Drug Products] Nausea And Vomiting    Peanut (Diagnostic)      Other reaction(s): Nausea And Vomiting       Review of Systems  Constitutional: Negative for activity change and appetite change. HENT: Negative for ear pain and facial swelling. Eyes: Negative for discharge and itching. Respiratory: Negative for choking and chest tightness. Cardiovascular: Negative for chest pain and leg swelling. Gastrointestinal: Negative for nausea and abdominal pain. Endocrine: Negative for cold intolerance and heat intolerance. Genitourinary: Negative for frequency and flank pain. Musculoskeletal: Negative for myalgias and joint swelling. Skin: Negative for rash and wound. Allergic/Immunologic: Negative for environmental allergies and food allergies. Hematological: Negative for adenopathy. Does not bruise/bleed easily. Psychiatric/Behavioral: Negative for self-injury. The patient is not nervous/anxious.       Physical Exam:      BP 127/76 (Site: Right Lower Arm, Position: Sitting, Cuff Size: Medium Adult)   Pulse 86   Temp 95 °F (35 °C) (Temporal)   Ht 5' 10\" (1.778 m)   Wt 250 lb (113.4 kg)   SpO2 97%   BMI 35.87 kg/m²   Estimated body mass index is 35.87 kg/m² as calculated from the following:    Height as of this encounter: 5' 10\" (1.778 m). Weight as of this encounter: 250 lb (113.4 kg). General:  Sebastian Watson is a 31y.o. year old female who appears her stated age. HEENT: Normocephalic atraumatic. Neck supple. Chest: regular rate; pulses equal  Abdomen: Soft nontender nondistended. Normoactive bowel sounds. Ext: DP and PT pulses 2+, good cap refill  Neuro    Mentation  Appropriate affect  Registration intact  Orientation intact  3 item recall intact  Judgement intact to situation    Cranial Nerves:   Pupils equal and reactive to light  Extraocular motion intact  Face and shrug symmetric  Tongue midline  No dysarthria  v1-3 sensation symmetric, masseter tone symmetric  Hearing symmetric and intact to finger rub    Sensation:   intact    Motor  L deltoid 5/5; R deltoid 5/5  L biceps 5/5; R biceps 5/5  L triceps 5/5; R triceps 5/5  L wrist extension 5/5; R wrist extension 5/5  L intrinsics 5/5; R intrinsics 5/5     L iliopsoas 5/5 , R iliopsoas 5/5  L quadriceps 5/5; R quadriceps 5/5  L Dorsiflexion 5/5; R dorsiflexion 5/5  L Plantarflexion 5/5; R plantarflexion 5/5  L EHL 5/5; R EHL 5/5    Reflexes  L Brachioradialis 2+/4; R brachioradialis 2+/4  L Biceps 2+/4; R Biceps 2+/4  L Triceps 2+/4; R Triceps 2+/4  L Patellar 2+/4: R Patellar 2+/4  L Achilles 2+/4; R Achilles 2+/4    hoffmans L: neg  hoffmans R: neg  Clonus L: neg  Clonus R: neg  Babinski L: up  Babinski R; up    Decreased fluctance of L/S region. +drain in place      Studies Review:     none    Assessment and Plan:      1.  Postoperative seroma involving nervous system after nervous system procedure          Plan: Unfortunate the brace that she was provided amounts tomorrow developed less than a compressive brace and so I do not believe that this is been really a contributing factor but her drainage has seemed to have improved significantly over the course of the last 9 days which is a's very good sign considering she may have progressive resolution. We will obtain a CT of the sacrum without contrast to evaluate for any residual fluid on Monday prior to potentially pulling the drain. In the interim I did reach out to the brace rep to obtain a better fitting more appropriate compressive brace in that region. Followup: No follow-ups on file. Prescriptions Ordered:  No orders of the defined types were placed in this encounter. Orders Placed:  No orders of the defined types were placed in this encounter. Electronically signed by Sharyle Bays, DO on 11/4/2020 at 11:00 AM    Please note that this chart was generated using voice recognition Dragon dictation software. Although every effort was made to ensure the accuracy of this automated transcription, some errors in transcription may have occurred.

## 2020-11-09 ENCOUNTER — OFFICE VISIT (OUTPATIENT)
Dept: NEUROSURGERY | Age: 30
End: 2020-11-09
Payer: MEDICARE

## 2020-11-09 ENCOUNTER — HOSPITAL ENCOUNTER (OUTPATIENT)
Dept: CT IMAGING | Age: 30
Discharge: HOME | End: 2020-11-11
Payer: MEDICARE

## 2020-11-09 VITALS
TEMPERATURE: 96.8 F | RESPIRATION RATE: 16 BRPM | DIASTOLIC BLOOD PRESSURE: 75 MMHG | HEART RATE: 101 BPM | SYSTOLIC BLOOD PRESSURE: 117 MMHG

## 2020-11-09 PROCEDURE — 72192 CT PELVIS W/O DYE: CPT

## 2020-11-09 PROCEDURE — 1036F TOBACCO NON-USER: CPT | Performed by: NEUROLOGICAL SURGERY

## 2020-11-09 PROCEDURE — G8484 FLU IMMUNIZE NO ADMIN: HCPCS | Performed by: NEUROLOGICAL SURGERY

## 2020-11-09 PROCEDURE — 99213 OFFICE O/P EST LOW 20 MIN: CPT | Performed by: NEUROLOGICAL SURGERY

## 2020-11-09 PROCEDURE — G8417 CALC BMI ABV UP PARAM F/U: HCPCS | Performed by: NEUROLOGICAL SURGERY

## 2020-11-09 PROCEDURE — G8427 DOCREV CUR MEDS BY ELIG CLIN: HCPCS | Performed by: NEUROLOGICAL SURGERY

## 2020-11-09 NOTE — PROGRESS NOTES
Sydnee Correia  [facility] [address] [address]  Dept: [phone]    Patient:  Kelsey Curiel  YOB: 1990  Date: 20    The patient is a 31 y.o. female who presents today for consult of the following problems:     Chief Complaint   Patient presents with    Post-Op Check             HPI:     Kelsey Curiel is a 31 y.o. female on whom neurosurgical consultation was requested by Mckinley Randall MD for management of seroma. Patient with significant proved pain upon lying and sitting up. No significant recall axial or radicular symptomology upon going from supine to sitting or standing. No fevers chills nausea vomiting. No new numbness tingling or weakness. Patient states that serous appearing drainage approximately 30 cc per 24 hours has been steady over the course of the last week with no change in appearance or change in volume or output.       History:     Past Medical History:   Diagnosis Date    Allergic rhinitis     Class 2 obesity due to excess calories without serious comorbidity with body mass index (BMI) of 35.0 to 35.9 in adult 5602    Complication of anesthesia     epidural stopped working    History of Chiari malformation     Hypothyroidism     Migraines     Neuropathy     Osteoarthritis     POTS (postural orthostatic tachycardia syndrome)     Psoriasis     RAD (reactive airway disease)     triggered by bleach only    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     [place]arlov [place]    Thyroid disease     Vasovagal syncope     Wears glasses      Past Surgical History:   Procedure Laterality Date     SECTION  4/11/10    EPIDURAL STEROID INJECTION Left 2019    EPIDURAL INJECTION s2 performed by Kayla Thomson MD at [facility] Bilateral 2018    bilat facet block no steroid    NERVE BLOCK  2019    S2 bupivacaine injection    SPINE SURGERY      sacral cysts and then several surgeries to correct spinal fluid leaks    US DRAIN SOFT TISSUE ABSCESS  10/26/2020    US DRAIN SOFT TISSUE ABSCESS 10/26/2020 [facility]     Family History   Problem Relation Age of Onset    Diabetes Paternal Grandmother     Heart Failure Maternal Grandmother     Heart Failure Maternal Grandfather     Diabetes Father      Current Outpatient Medications on File Prior to Visit   Medication Sig Dispense Refill    cephALEXin (KEFLEX) 500 MG capsule Take 1 capsule by mouth 3 times daily for 5 days 15 capsule 0    methocarbamol (ROBAXIN) 750 MG tablet Take 1 tablet by mouth every 8 hours as needed      ondansetron (ZOFRAN) 4 MG tablet Take 1 tablet by mouth as needed      amitriptyline (ELAVIL) 100 MG tablet Take 1 tablet by mouth nightly 30 tablet 3    tiZANidine (ZANAFLEX) 4 MG tablet take 1 tablet by mouth three times a day (Patient taking differently: take 1 tablet by mouth three times a day prn) 60 tablet 3    albuterol sulfate  (90 Base) MCG/ACT inhaler Inhale 2 puffs into the lungs every 6 hours as needed for Wheezing or Shortness of Breath 1 Inhaler 1    Handicap Placard MISC by Does not apply route Expires on 12/30/21 1 each 0    thyroid (ARMOUR) 130 MG tablet Take 1 tablet by mouth daily 90 tablet 3    loratadine (CLARITIN) 10 MG tablet Take 1 tablet by mouth daily 30 tablet 2    naproxen (NAPROSYN) 500 MG tablet Take 1 tablet by mouth 2 times daily (with meals) 120 tablet 3    Misc. Devices (ROLLER WALKER) MISC Walker with wheels 1 each 0    fluticasone (FLONASE) 50 MCG/ACT nasal spray 1 spray by Nasal route daily 1 Bottle 3    Respiratory Therapy Supplies (BREATHERITE VALVED MDI CHAMBER) RICHARD To be used with inhaler 1 Device 1    fentaNYL (DURAGESIC) 25 MCG/HR Place 1 patch onto the skin every 48 hours as needed.       SUMAtriptan (IMITREX) 100 MG tablet Take 1 tablet by mouth once as needed for Migraine 10 tablet 3 No current facility-administered medications on file prior to visit. Social History     Tobacco Use    Smoking status: Never Smoker    Smokeless tobacco: Never Used   Substance Use Topics    Alcohol use: No    Drug use: No       Allergies   Allergen Reactions    Latex Hives and Swelling    Other Shortness Of Breath     Bleach causes reactive airway disease    Nut [Peanut-Containing Drug Products] Nausea And Vomiting    Peanut (Diagnostic)      Other reaction(s): Nausea And Vomiting       Review of Systems  Constitutional: Negative for activity change and appetite change. HENT: Negative for ear pain and facial swelling. Eyes: Negative for discharge and itching. Respiratory: Negative for choking and chest tightness. Cardiovascular: Negative for chest pain and leg swelling. Gastrointestinal: Negative for nausea and abdominal pain. Endocrine: Negative for cold intolerance and heat intolerance. Genitourinary: Negative for frequency and flank pain. Musculoskeletal: Negative for myalgias and joint swelling. Skin: Negative for rash and wound. Allergic/Immunologic: Negative for environmental allergies and food allergies. Hematological: Negative for adenopathy. Does not bruise/bleed easily. Psychiatric/Behavioral: Negative for self-injury. The patient is not nervous/anxious. Physical Exam:      /75 (Site: Right Upper Arm, Position: Sitting, Cuff Size: Medium Adult)   Pulse 101   Temp 96.8 °F (36 °C)   Resp 16   Estimated body mass index is 35.87 kg/m² as calculated from the following:    Height as of 11/4/20: 5' 10\" (1.778 m). Weight as of 11/4/20: 250 lb (113.4 kg). General:  Sebastian Watson is a 31y.o. year old female who appears her stated age. HEENT: Normocephalic atraumatic. Neck supple. Chest: regular rate; pulses equal  Abdomen: Soft nontender nondistended. Normoactive bowel sounds.   Ext: DP and PT pulses 2+, good cap refill  Neuro    Mentation  Appropriate affect  Registration intact  Orientation intact  3 item recall intact  Judgement intact to situation    Cranial Nerves:   Pupils equal and reactive to light  Extraocular motion intact  Face and shrug symmetric  Tongue midline  No dysarthria  v1-3 sensation symmetric, masseter tone symmetric  Hearing symmetric and intact to finger rub    Sensation:   ikntact    Motor  L deltoid 5/5; R deltoid 5/5  L biceps 5/5; R biceps 5/5  L triceps 5/5; R triceps 5/5  L wrist extension 5/5; R wrist extension 5/5  L intrinsics 5/5; R intrinsics 5/5     L iliopsoas 5/5 , R iliopsoas 5/5  L quadriceps 5/5; R quadriceps 5/5  L Dorsiflexion 5/5; R dorsiflexion 5/5  L Plantarflexion 5/5; R plantarflexion 5/5  L EHL 5/5; R EHL 5/5    Reflexes  L Brachioradialis 2+/4; R brachioradialis 2+/4  L Biceps 2+/4; R Biceps 2+/4  L Triceps 2+/4; R Triceps 2+/4  L Patellar 2+/4: R Patellar 2+/4  L Achilles 2+/4; R Achilles 2+/4    hoffmans L: neg  hoffmans R: neg  Clonus L: neg  Clonus R: neg  Babinski L: up  Babinski R; up    No palpable fluctuance evident. Drain in place    Studies Review:     CT pelvis shows no residual seroma and drain in place. Assessment and Plan:      1. Postoperative seroma involving nervous system after nervous system procedure          Plan: Considering stabilization of drain output approximate 1 cc/h over the last week as well as complete resolution of the seroma on CT I do not believe there is any utility to keeping the drain in place at this point. Drain was removed in the office. Patient instructed to maintain the brace as prior all times and upright with compressive effect on the seroma site. We will reevaluate in 2 weeks clinically. Followup: No follow-ups on file. Prescriptions Ordered:  No orders of the defined types were placed in this encounter. Orders Placed:  No orders of the defined types were placed in this encounter.        Electronically signed by Carissa Herring DO on 11/9/2020 at 10:48 AM    Please note that this chart was generated using voice recognition Dragon dictation software. Although every effort was made to ensure the accuracy of this automated transcription, some errors in transcription may have occurred.

## 2020-12-02 ENCOUNTER — OFFICE VISIT (OUTPATIENT)
Dept: NEUROSURGERY | Age: 30
End: 2020-12-02
Payer: MEDICARE

## 2020-12-02 VITALS
WEIGHT: 250 LBS | OXYGEN SATURATION: 99 % | DIASTOLIC BLOOD PRESSURE: 82 MMHG | BODY MASS INDEX: 35.87 KG/M2 | HEART RATE: 82 BPM | SYSTOLIC BLOOD PRESSURE: 138 MMHG

## 2020-12-02 PROCEDURE — 99213 OFFICE O/P EST LOW 20 MIN: CPT | Performed by: NURSE PRACTITIONER

## 2020-12-02 PROCEDURE — G8417 CALC BMI ABV UP PARAM F/U: HCPCS | Performed by: NURSE PRACTITIONER

## 2020-12-02 PROCEDURE — G8484 FLU IMMUNIZE NO ADMIN: HCPCS | Performed by: NURSE PRACTITIONER

## 2020-12-02 PROCEDURE — 1036F TOBACCO NON-USER: CPT | Performed by: NURSE PRACTITIONER

## 2020-12-02 PROCEDURE — G8427 DOCREV CUR MEDS BY ELIG CLIN: HCPCS | Performed by: NURSE PRACTITIONER

## 2020-12-02 NOTE — PROGRESS NOTES
Sydnee  [address] [facility] [address] [address]  Dept: [phone]    Patient:  Aristeo Almonte  YOB: 1990  Date: 20    The patient is a 31 y.o. female who presents today for consult of the following problems:     Chief Complaint   Patient presents with    Follow-up     Postoperative seroma involving nervous system after nervous system procedure         HPI:     Aristeo Almonte is a 31 y.o. female has been able to wear brace around 60% of the time, last wore the brace on . Does feel that symptoms have significantly improved. Has very minimal swelling over prior surgical site. Is not having the pain that she was having prior to lumbar drain, bracing. Does feel that it is much more tolerable. Denies any current numbness or tingling. Has been able to get back to some normalcy in regards of function. Denies any fevers or chills. No discharge or drainage present.     History:     Past Medical History:   Diagnosis Date    Allergic rhinitis     Class 2 obesity due to excess calories without serious comorbidity with body mass index (BMI) of 35.0 to 35.9 in adult     Complication of anesthesia     epidural stopped working    History of Chiari malformation     Hypothyroidism     Migraines     Neuropathy     Osteoarthritis     POTS (postural orthostatic tachycardia syndrome)     Psoriasis     RAD (reactive airway disease)     triggered by bleach only    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     [place]ov [place]    Thyroid disease     Vasovagal syncope     Wears glasses      Past Surgical History:   Procedure Laterality Date     SECTION  4/11/10    EPIDURAL STEROID INJECTION Left 2019    EPIDURAL INJECTION s2 performed by Madelin Figueroa MD at [facility] Bilateral 2018    bilat facet block no steroid    NERVE BLOCK 06/20/2019    S2 bupivacaine injection    SPINE SURGERY      sacral cysts and then several surgeries to correct spinal fluid leaks    US DRAIN SOFT TISSUE ABSCESS  10/26/2020    US DRAIN SOFT TISSUE ABSCESS 10/26/2020 [facility]     Family History   Problem Relation Age of Onset    Diabetes Paternal Grandmother     Heart Failure Maternal Grandmother     Heart Failure Maternal Grandfather     Diabetes Father      Current Outpatient Medications on File Prior to Visit   Medication Sig Dispense Refill    methocarbamol (ROBAXIN) 750 MG tablet Take 1 tablet by mouth every 8 hours as needed      ondansetron (ZOFRAN) 4 MG tablet Take 1 tablet by mouth as needed      amitriptyline (ELAVIL) 100 MG tablet Take 1 tablet by mouth nightly 30 tablet 3    tiZANidine (ZANAFLEX) 4 MG tablet take 1 tablet by mouth three times a day (Patient taking differently: take 1 tablet by mouth three times a day prn) 60 tablet 3    albuterol sulfate  (90 Base) MCG/ACT inhaler Inhale 2 puffs into the lungs every 6 hours as needed for Wheezing or Shortness of Breath 1 Inhaler 1    Handicap Placard MISC by Does not apply route Expires on 12/30/21 1 each 0    thyroid (ARMOUR) 130 MG tablet Take 1 tablet by mouth daily 90 tablet 3    loratadine (CLARITIN) 10 MG tablet Take 1 tablet by mouth daily 30 tablet 2    naproxen (NAPROSYN) 500 MG tablet Take 1 tablet by mouth 2 times daily (with meals) 120 tablet 3    Misc. Devices (ROLLER WALKER) MISC Walker with wheels 1 each 0    fluticasone (FLONASE) 50 MCG/ACT nasal spray 1 spray by Nasal route daily 1 Bottle 3    Respiratory Therapy Supplies (BREATHERITE VALVED MDI CHAMBER) RICHARD To be used with inhaler 1 Device 1    fentaNYL (DURAGESIC) 25 MCG/HR Place 1 patch onto the skin every 48 hours as needed.  SUMAtriptan (IMITREX) 100 MG tablet Take 1 tablet by mouth once as needed for Migraine 10 tablet 3     No current facility-administered medications on file prior to visit. recall intact  Judgement intact to situation    Cranial Nerves:   Pupils equal and reactive to light  Extraocular motion intact  Face and shrug symmetric  Tongue midline  No dysarthria  v1-3 sensation symmetric, masseter tone symmetric  Hearing symmetric    Sensation: Intact    Motor  L deltoid 5/5; R deltoid 5/5  L biceps 5/5; R biceps 5/5  L triceps 5/5; R triceps 5/5  L wrist extension 5/5; R wrist extension 5/5  L intrinsics 5/5; R intrinsics 5/5     L iliopsoas 5/5 , R iliopsoas 5/5  L quadriceps 5/5; R quadriceps 5/5  L Dorsiflexion 5/5; R dorsiflexion 5/5  L Plantarflexion 5/5; R plantarflexion 5/5  L EHL 5/5; R EHL 5/5    Reflexes  L Brachioradialis 2+/4; R brachioradialis 2+/4  L Biceps 2+/4; R Biceps 2+/4  L Triceps 2+/4; R Triceps 2+/4  L Patellar 2+/4: R Patellar 2+/4  L Achilles 2+/4; R Achilles 2+/4    hoffmans L: neg  hoffmans R: neg  Clonus L: neg  Clonus R: neg  Babinski L: neg  Babinski R: neg    Studies Review:     No new imaging    Assessment and Plan:      1. Postoperative seroma involving nervous system after nervous system procedure          Plan: Patient with overall improved symptoms. Has essentially weaned herself out of the brace with no recurrence of swelling/seroma. No discharge or drainage from the area. No fevers chills. Has not had any additional communication with her surgeon in [place]. Recommend continued monitoring, return to office with any recurrence of swelling, worsened pain, discomfort. Followup: Return if symptoms worsen or fail to improve. Prescriptions Ordered:  No orders of the defined types were placed in this encounter. Orders Placed:  No orders of the defined types were placed in this encounter. Electronically signed by Nile Kayser, APRN - CNP on 12/4/2020 at 10:55 AM    Please note that this chart was generated using voice recognition Dragon dictation software.   Although every effort was made to ensure the accuracy of this automated transcription, some errors in transcription may have occurred.

## 2021-02-18 DIAGNOSIS — M51.26 LUMBAR DISC HERNIATION: ICD-10-CM

## 2021-02-18 DIAGNOSIS — E03.8 SUBCLINICAL HYPOTHYROIDISM: ICD-10-CM

## 2021-02-18 DIAGNOSIS — M54.16 LUMBAR RADICULOPATHY: ICD-10-CM

## 2021-02-19 RX ORDER — TIZANIDINE 4 MG/1
TABLET ORAL
Qty: 60 TABLET | Refills: 3 | Status: SHIPPED | OUTPATIENT
Start: 2021-02-19 | End: 2021-03-16

## 2021-02-23 ENCOUNTER — TELEPHONE (OUTPATIENT)
Dept: FAMILY MEDICINE CLINIC | Age: 31
End: 2021-02-23

## 2021-02-23 DIAGNOSIS — E03.8 SUBCLINICAL HYPOTHYROIDISM: Primary | ICD-10-CM

## 2021-02-23 NOTE — TELEPHONE ENCOUNTER
Received call from pharmacy that [place] ([facility]ela) they do not have in stock and they are not able to order it. Patient wants something natural as well. Pharmacist was not sure what to suggest. Please advise.

## 2021-02-24 RX ORDER — LEVOTHYROXINE SODIUM 0.07 MG/1
75 TABLET ORAL DAILY
Qty: 90 TABLET | Refills: 1 | Status: SHIPPED | OUTPATIENT
Start: 2021-02-24 | End: 2021-05-17 | Stop reason: DRUGHIGH

## 2021-03-16 ENCOUNTER — OFFICE VISIT (OUTPATIENT)
Dept: FAMILY MEDICINE CLINIC | Age: 31
End: 2021-03-16
Payer: MEDICARE

## 2021-03-16 VITALS
TEMPERATURE: 98.3 F | DIASTOLIC BLOOD PRESSURE: 86 MMHG | WEIGHT: 235 LBS | SYSTOLIC BLOOD PRESSURE: 130 MMHG | BODY MASS INDEX: 33.72 KG/M2 | OXYGEN SATURATION: 100 % | HEART RATE: 90 BPM

## 2021-03-16 DIAGNOSIS — R73.03 PREDIABETES: ICD-10-CM

## 2021-03-16 DIAGNOSIS — Z3A.01 LESS THAN 8 WEEKS GESTATION OF PREGNANCY: ICD-10-CM

## 2021-03-16 DIAGNOSIS — E66.09 CLASS 1 OBESITY DUE TO EXCESS CALORIES WITHOUT SERIOUS COMORBIDITY WITH BODY MASS INDEX (BMI) OF 33.0 TO 33.9 IN ADULT: ICD-10-CM

## 2021-03-16 DIAGNOSIS — E03.8 SUBCLINICAL HYPOTHYROIDISM: Primary | ICD-10-CM

## 2021-03-16 DIAGNOSIS — J45.20 MILD INTERMITTENT REACTIVE AIRWAY DISEASE WITHOUT COMPLICATION: ICD-10-CM

## 2021-03-16 DIAGNOSIS — H93.8X3 EAR FULLNESS, BILATERAL: ICD-10-CM

## 2021-03-16 DIAGNOSIS — Z00.00 PREVENTATIVE HEALTH CARE: ICD-10-CM

## 2021-03-16 DIAGNOSIS — G90.A POTS (POSTURAL ORTHOSTATIC TACHYCARDIA SYNDROME): ICD-10-CM

## 2021-03-16 DIAGNOSIS — M54.16 LUMBAR RADICULOPATHY: ICD-10-CM

## 2021-03-16 PROBLEM — Q07.01 CHIARI MALFORMATION TYPE II (HCC): Status: RESOLVED | Noted: 2019-09-03 | Resolved: 2021-03-16

## 2021-03-16 LAB — HBA1C MFR BLD: 5.1 %

## 2021-03-16 PROCEDURE — G8484 FLU IMMUNIZE NO ADMIN: HCPCS | Performed by: FAMILY MEDICINE

## 2021-03-16 PROCEDURE — 99214 OFFICE O/P EST MOD 30 MIN: CPT | Performed by: FAMILY MEDICINE

## 2021-03-16 PROCEDURE — 83036 HEMOGLOBIN GLYCOSYLATED A1C: CPT | Performed by: FAMILY MEDICINE

## 2021-03-16 PROCEDURE — G8417 CALC BMI ABV UP PARAM F/U: HCPCS | Performed by: FAMILY MEDICINE

## 2021-03-16 PROCEDURE — 1036F TOBACCO NON-USER: CPT | Performed by: FAMILY MEDICINE

## 2021-03-16 PROCEDURE — G8427 DOCREV CUR MEDS BY ELIG CLIN: HCPCS | Performed by: FAMILY MEDICINE

## 2021-03-16 ASSESSMENT — ENCOUNTER SYMPTOMS
COLOR CHANGE: 0
COUGH: 0
WHEEZING: 0
ABDOMINAL PAIN: 0
CONSTIPATION: 0
NAUSEA: 0
RHINORRHEA: 0
RECTAL PAIN: 0
VOMITING: 0
BLOOD IN STOOL: 0
SHORTNESS OF BREATH: 0
PHOTOPHOBIA: 0
ABDOMINAL DISTENTION: 0
BACK PAIN: 0

## 2021-03-16 ASSESSMENT — PATIENT HEALTH QUESTIONNAIRE - PHQ9
SUM OF ALL RESPONSES TO PHQ QUESTIONS 1-9: 0
1. LITTLE INTEREST OR PLEASURE IN DOING THINGS: 0
2. FEELING DOWN, DEPRESSED OR HOPELESS: 0
SUM OF ALL RESPONSES TO PHQ9 QUESTIONS 1 & 2: 0

## 2021-03-16 NOTE — PROGRESS NOTES
Chief Complaint   Patient presents with   [facility] Maintenance     not sure when last pap was, tdap up to date, refused vaccines    Other     here for follow up, last period was 1-31-21    Ear Fullness     complains that ears are always full    Medication Refill     has not taken any medication, positive pregnancy test at home, wants to know what she can and cannot take         Alanna Quiros  here today for follow up on chronic medical problems, go over labs and/or diagnostic studies, and medication refills. Health Maintenance (not sure when last pap was, tdap up to date, refused vaccines), Other (here for follow up, last period was 1-31-21), Ear Fullness (complains that ears are always full), and Medication Refill (has not taken any medication, positive pregnancy test at home, wants to know what she can and cannot take)      HPI: Patient is here for follow-up on asthma hypothyroidism, not seen more than 6 months. Hypothyroidism stable on previous blood work. Patient denies any fatigue, constipation. Patient is prediabetic and A1c is normal today. Asthma stable on albuterol inhaler takes only as needed denies any recent exacerbations. Patient has history of pots and lumbar radiculopathy her surgery done doing well. Patient reports she has not taken any medications for pain. She has stopped taking muscle relaxant also. Patient is pregnant less than 8 weeks and needs gynecologist.  Last menstrual cycle was first week of January. Obesity patient has lost weight about 10 pounds in the last 6 months. Patient reports she is walking more and is also watching her diet. Patient complains of fullness in both ears mostly in Claritin as needed basis. Reports that started helping recently she denies any recent sore throat or any recent upper respiratory infection. She denies any ear discharge.     /86   Pulse 90   Temp 98.3 °F (36.8 °C)   Wt 235 lb (106.6 kg)   LMP 01/31/2021 (Exact Date)   SpO2 100%   BMI 33.72 kg/m²    Body mass index is 33.72 kg/m². Wt Readings from Last 3 Encounters:   03/16/21 235 lb (106.6 kg)   12/02/20 250 lb (113.4 kg)   11/04/20 250 lb (113.4 kg)        [x]Negative depression screening. PHQ Scores 3/16/2021 4/30/2020 2/28/2019 10/31/2018 1/9/2018 12/27/2016   PHQ2 Score 0 0 0 0 0 0   PHQ9 Score 0 0 0 0 0 0      []1-4 = Minimal depression   []5-9 = Milddepression   []10-14 = Moderate depression   []15-19 = Moderately severe depression   []20-27 = Severe depression    Discussed testing with the patient and all questions fully answered. Hospital Outpatient Visit on 10/26/2020   Component Date Value Ref Range Status    PTT 10/26/2020 23.7  20.5 - 30.5 sec Final    Comment:       IV Heparin Therapy Range:  48.6-77.8      Platelets 58/15/2856 235  138 - 453 k/uL Final    Protime 10/26/2020 9.6  9.0 - 12.0 sec Final    INR 10/26/2020 0.9   Final    Comment:       Therapeutic Range:    Moderate Anticoagulant Intensity:     INR = 2.0-3.0   High Anticoagulant Intensity:     INR = 2.5-3.5                 Most recent labs reviewed:     Lab Results   Component Value Date    WBC 4.9 06/25/2020    HGB 13.3 06/25/2020    HCT 42.1 06/25/2020    MCV 90.5 06/25/2020     10/26/2020       @BRIEFLAB(NA,K,CL,CO2,BUN,CREATININE,GLUCOSE,CALCIUM)@     Lab Results   Component Value Date    ALT 30 04/30/2020    AST 22 04/30/2020    ALKPHOS 73 04/30/2020    BILITOT 0.65 04/30/2020       Lab Results   Component Value Date    TSH 6.23 (H) 04/30/2020       Lab Results   Component Value Date    CHOL 184 09/06/2019     Lab Results   Component Value Date    TRIG 56 09/06/2019     Lab Results   Component Value Date    HDL 42 09/06/2019     Lab Results   Component Value Date    LDLCHOLESTEROL 131 (H) 09/06/2019     Lab Results   Component Value Date    VLDL NOT REPORTED 09/06/2019     Lab Results   Component Value Date    CHOLHDLRATIO 4.4 09/06/2019       Lab Results   Component Value Date    LABA1C 5.1 03/16/2021       Lab Results   Component Value Date    IUOJEIFS04 320 05/31/2017       Lab Results   Component Value Date    FOLATE 14.8 05/31/2017       No results found for: IRON, TIBC, FERRITIN    Lab Results   Component Value Date    VITD25 27.4 (L) 04/30/2020             Current Outpatient Medications   Medication Sig Dispense Refill    levothyroxine (SYNTHROID) 75 MCG tablet Take 1 tablet by mouth daily 90 tablet 1    albuterol sulfate  (90 Base) MCG/ACT inhaler Inhale 2 puffs into the lungs every 6 hours as needed for Wheezing or Shortness of Breath 1 Inhaler 1    Respiratory Therapy Supplies (BREATHERITE VALVED MDI CHAMBER) RICHARD To be used with inhaler 1 Device 1    thyroid (ARMOUR) 130 MG tablet Take 1 tablet by mouth daily (Patient not taking: Reported on 3/16/2021) 90 tablet 3     No current facility-administered medications for this visit.               Social History     Socioeconomic History    Marital status:      Spouse name: Not on file    Number of children: Not on file    Years of education: Not on file    Highest education level: Not on file   Occupational History    Not on file   Social Needs    Financial resource strain: Not on file    Food insecurity     Worry: Not on file     Inability: Not on file    Transportation needs     Medical: Not on file     Non-medical: Not on file   Tobacco Use    Smoking status: Never Smoker    Smokeless tobacco: Never Used   Substance and Sexual Activity    Alcohol use: No    Drug use: No    Sexual activity: Not Currently     Partners: Male   Lifestyle    Physical activity     Days per week: Not on file     Minutes per session: Not on file    Stress: Not on file   Relationships    Social connections     Talks on phone: Not on file     Gets together: Not on file     Attends Mosque service: Not on file     Active member of club or organization: Not on file     Attends meetings of clubs or organizations: Not on Physical Exam  Vitals signs and nursing note reviewed. Constitutional:       Appearance: Normal appearance. She is obese. HENT:      Head: Normocephalic. Right Ear: Tympanic membrane normal. No drainage or tenderness. No middle ear effusion. There is no impacted cerumen. Left Ear: No drainage or tenderness. No middle ear effusion. There is no impacted cerumen. Tympanic membrane is perforated. Cardiovascular:      Rate and Rhythm: Normal rate and regular rhythm. Heart sounds: Normal heart sounds, S1 normal and S2 normal.   Pulmonary:      Effort: Pulmonary effort is normal.      Breath sounds: Normal breath sounds. No decreased breath sounds or wheezing. Abdominal:      General: Bowel sounds are normal.      Tenderness: There is no abdominal tenderness. Lymphadenopathy:      Cervical: No cervical adenopathy. Skin:     General: Skin is warm. Findings: No rash. Neurological:      Mental Status: She is alert and oriented to person, place, and time. Cranial Nerves: Cranial nerves are intact. No cranial nerve deficit. Sensory: No sensory deficit. Motor: Motor function is intact. No weakness. Gait: Gait is intact. Gait normal.   Psychiatric:         Attention and Perception: Attention and perception normal.         Mood and Affect: Mood and affect normal. Mood is not anxious or depressed. Speech: She is communicative. Speech is not rapid and pressured. Behavior: Behavior normal. Behavior is not agitated or slowed. Thought Content: Thought content normal. Thought content is not paranoid or delusional. Thought content does not include homicidal ideation. Cognition and Memory: Cognition and memory normal. Cognition is not impaired. ASSESSMENT AND PLAN      1. Subclinical hypothyroidism  Stable continue Synthroid recheck TSH  - TSH without Reflex; Future    2. Prediabetes  A1c is normal continue diet control.   Continue weight reduction  - CBC Auto Differential; Future  - Comprehensive Metabolic Panel; Future  - Lipid Panel; Future  - POCT glycosylated hemoglobin (Hb A1C)    3. Mild intermittent reactive airway disease without complication  Stable continue albuterol inhaler as needed  - CBC Auto Differential; Future  - Comprehensive Metabolic Panel; Future  - Lipid Panel; Future  - TSH without Reflex; Future  - Urinalysis Reflex to Culture; Future    4. Lumbar radiculopathy  Pain has improved after surgery continue to monitor    5. POTS (postural orthostatic tachycardia syndrome)  Stable keep yourself hydrated    6. Less than 8 weeks gestation of pregnancy    - [address] [facility] [place], [place]    7. Class 1 obesity due to excess calories without serious comorbidity with body mass index (BMI) of 33.0 to 33.9 in adult  Weight has improved continue to monitor. Will discuss more after pregnancy    8. Ear fullness, bilateral  Discussed with patient exam is normal.    9. Preventative health care    - Hepatitis C Antibody; Future      Orders Placed This Encounter   Procedures    CBC Auto Differential     Standing Status:   Future     Standing Expiration Date:   3/17/2022    Comprehensive Metabolic Panel     Fasting 8 hrs     Standing Status:   Future     Standing Expiration Date:   3/16/2022    Lipid Panel     Standing Status:   Future     Standing Expiration Date:   3/16/2022     Order Specific Question:   Is Patient Fasting?/# of Hours     Answer:   yes, 8-10 hours    TSH without Reflex     Standing Status:   Future     Standing Expiration Date:   3/16/2022    Urinalysis Reflex to Culture     Standing Status:   Future     Standing Expiration Date:   3/16/2022     Order Specific Question:   SPECIFY(EX-CATH,MIDSTREAM,CYSTO,ETC)?      Answer:   midstream    Hepatitis C Antibody     Standing Status:   Future     Standing Expiration Date:   3/16/2022   [address], [address], [address], [address] [place] [address]     Referral PHQ Scores 3/16/2021 4/30/2020 2/28/2019 10/31/2018 1/9/2018 12/27/2016   PHQ2 Score 0 0 0 0 0 0   PHQ9 Score 0 0 0 0 0 0     Interpretation of Total Score Depression Severity: 1-4 = Minimal depression, 5-9 = Mild depression, 10-14 = Moderate depression, 15-19 = Moderately severe depression, 20-27 = Severe depression    The patient'spast medical, surgical, social, and family history as well as her   current medications and allergies were reviewed as documented in today's encounter. Medications, labs, diagnostic studies, consultations andfollow-up as documented in this encounter. No follow-ups on file. Patient wasseen with total face to face time of 30 minutes. More than 50% of this visit was counseling and education. Future Appointments   Date Time Provider Westley Smith   3/22/2021 10:00  [facility] [address]     This note was completed by using the assistance of a speech-recognition program. However, inadvertent computerized transcription errors may be present. Althoughevery effort was made to ensure accuracy, no guarantees can be provided that every mistake has been identified and corrected by editing.   Electronically signed by Ildefonso Li MD on 3/16/2021  10:51 AM

## 2021-03-17 ENCOUNTER — HOSPITAL ENCOUNTER (OUTPATIENT)
Age: 31
Discharge: HOME | End: 2021-03-17
Payer: MEDICARE

## 2021-03-17 DIAGNOSIS — Z00.00 PREVENTATIVE HEALTH CARE: ICD-10-CM

## 2021-03-17 DIAGNOSIS — J45.20 MILD INTERMITTENT REACTIVE AIRWAY DISEASE WITHOUT COMPLICATION: ICD-10-CM

## 2021-03-17 DIAGNOSIS — E03.8 SUBCLINICAL HYPOTHYROIDISM: ICD-10-CM

## 2021-03-17 DIAGNOSIS — R73.03 PREDIABETES: ICD-10-CM

## 2021-03-17 LAB
ABSOLUTE EOS #: 0.1 K/UL (ref 0–0.4)
ABSOLUTE IMMATURE GRANULOCYTE: ABNORMAL K/UL (ref 0–0.3)
ABSOLUTE LYMPH #: 1.7 K/UL (ref 1–4.8)
ABSOLUTE MONO #: 0.5 K/UL (ref 0.1–1.3)
ALBUMIN SERPL-MCNC: 3.8 G/DL (ref 3.5–5.2)
ALBUMIN/GLOBULIN RATIO: ABNORMAL (ref 1–2.5)
ALP BLD-CCNC: 78 U/L (ref 35–104)
ALT SERPL-CCNC: 28 U/L (ref 5–33)
ANION GAP SERPL CALCULATED.3IONS-SCNC: 9 MMOL/L (ref 9–17)
AST SERPL-CCNC: 18 U/L
BASOPHILS # BLD: 0 % (ref 0–2)
BASOPHILS ABSOLUTE: 0 K/UL (ref 0–0.2)
BILIRUB SERPL-MCNC: 0.76 MG/DL (ref 0.3–1.2)
BILIRUBIN URINE: NEGATIVE
BUN BLDV-MCNC: 8 MG/DL (ref 6–20)
BUN/CREAT BLD: ABNORMAL (ref 9–20)
CALCIUM SERPL-MCNC: 9.1 MG/DL (ref 8.6–10.4)
CHLORIDE BLD-SCNC: 103 MMOL/L (ref 98–107)
CHOLESTEROL/HDL RATIO: 3.5
CHOLESTEROL: 148 MG/DL
CO2: 22 MMOL/L (ref 20–31)
COLOR: YELLOW
COMMENT UA: NORMAL
CREAT SERPL-MCNC: 0.75 MG/DL (ref 0.5–0.9)
DIFFERENTIAL TYPE: ABNORMAL
EOSINOPHILS RELATIVE PERCENT: 1 % (ref 0–4)
GFR AFRICAN AMERICAN: >60 ML/MIN
GFR NON-AFRICAN AMERICAN: >60 ML/MIN
GFR SERPL CREATININE-BSD FRML MDRD: ABNORMAL ML/MIN/{1.73_M2}
GFR SERPL CREATININE-BSD FRML MDRD: ABNORMAL ML/MIN/{1.73_M2}
GLUCOSE BLD-MCNC: 94 MG/DL (ref 70–99)
GLUCOSE URINE: NEGATIVE
HCT VFR BLD CALC: 38.9 % (ref 36–46)
HDLC SERPL-MCNC: 42 MG/DL
HEMOGLOBIN: 13.3 G/DL (ref 12–16)
HEPATITIS C ANTIBODY: NONREACTIVE
IMMATURE GRANULOCYTES: ABNORMAL %
KETONES, URINE: NEGATIVE
LDL CHOLESTEROL: 90 MG/DL (ref 0–130)
LEUKOCYTE ESTERASE, URINE: NEGATIVE
LYMPHOCYTES # BLD: 25 % (ref 24–44)
MCH RBC QN AUTO: 29.4 PG (ref 26–34)
MCHC RBC AUTO-ENTMCNC: 34.1 G/DL (ref 31–37)
MCV RBC AUTO: 86.3 FL (ref 80–100)
MONOCYTES # BLD: 7 % (ref 1–7)
NITRITE, URINE: NEGATIVE
NRBC AUTOMATED: ABNORMAL PER 100 WBC
PDW BLD-RTO: 13.5 % (ref 11.5–14.9)
PH UA: 6 (ref 5–8)
PLATELET # BLD: 228 K/UL (ref 150–450)
PLATELET ESTIMATE: ABNORMAL
PMV BLD AUTO: 7.8 FL (ref 6–12)
POTASSIUM SERPL-SCNC: 4 MMOL/L (ref 3.7–5.3)
PROTEIN UA: NEGATIVE
RBC # BLD: 4.51 M/UL (ref 4–5.2)
RBC # BLD: ABNORMAL 10*6/UL
SEG NEUTROPHILS: 67 % (ref 36–66)
SEGMENTED NEUTROPHILS ABSOLUTE COUNT: 4.6 K/UL (ref 1.3–9.1)
SODIUM BLD-SCNC: 134 MMOL/L (ref 135–144)
SPECIFIC GRAVITY UA: 1.01 (ref 1–1.03)
TOTAL PROTEIN: 6.5 G/DL (ref 6.4–8.3)
TRIGL SERPL-MCNC: 79 MG/DL
TSH SERPL DL<=0.05 MIU/L-ACNC: 4.66 MIU/L (ref 0.3–5)
TURBIDITY: CLEAR
URINE HGB: NEGATIVE
UROBILINOGEN, URINE: NORMAL
VLDLC SERPL CALC-MCNC: NORMAL MG/DL (ref 1–30)
WBC # BLD: 7 K/UL (ref 3.5–11)
WBC # BLD: ABNORMAL 10*3/UL

## 2021-03-17 PROCEDURE — 81003 URINALYSIS AUTO W/O SCOPE: CPT

## 2021-03-17 PROCEDURE — 86803 HEPATITIS C AB TEST: CPT

## 2021-03-17 PROCEDURE — 36415 COLL VENOUS BLD VENIPUNCTURE: CPT

## 2021-03-17 PROCEDURE — 80053 COMPREHEN METABOLIC PANEL: CPT

## 2021-03-17 PROCEDURE — 85025 COMPLETE CBC W/AUTO DIFF WBC: CPT

## 2021-03-17 PROCEDURE — 80061 LIPID PANEL: CPT

## 2021-03-17 PROCEDURE — 84443 ASSAY THYROID STIM HORMONE: CPT

## 2021-03-22 ENCOUNTER — HOSPITAL ENCOUNTER (OUTPATIENT)
Age: 31
Setting detail: SPECIMEN
Discharge: HOME | End: 2021-03-22
Payer: MEDICARE

## 2021-03-22 ENCOUNTER — OFFICE VISIT (OUTPATIENT)
Dept: OBGYN CLINIC | Age: 31
End: 2021-03-22
Payer: MEDICARE

## 2021-03-22 ENCOUNTER — TELEPHONE (OUTPATIENT)
Dept: OBGYN CLINIC | Age: 31
End: 2021-03-22

## 2021-03-22 VITALS
HEIGHT: 70 IN | TEMPERATURE: 98.3 F | DIASTOLIC BLOOD PRESSURE: 82 MMHG | WEIGHT: 233 LBS | BODY MASS INDEX: 33.36 KG/M2 | SYSTOLIC BLOOD PRESSURE: 122 MMHG

## 2021-03-22 DIAGNOSIS — N92.6 MISSED MENSES: Primary | ICD-10-CM

## 2021-03-22 DIAGNOSIS — O34.219 VBAC (VAGINAL BIRTH AFTER CESAREAN): ICD-10-CM

## 2021-03-22 DIAGNOSIS — Z82.79 FAMILY HISTORY OF DOWN SYNDROME: ICD-10-CM

## 2021-03-22 DIAGNOSIS — Z82.79 FAMILY HISTORY OF CONGENITAL HEART DEFECT: ICD-10-CM

## 2021-03-22 DIAGNOSIS — Z98.891 H/O: C-SECTION: ICD-10-CM

## 2021-03-22 DIAGNOSIS — N92.6 MISSED MENSES: ICD-10-CM

## 2021-03-22 LAB — HCG QUANTITATIVE: ABNORMAL IU/L

## 2021-03-22 PROCEDURE — 1036F TOBACCO NON-USER: CPT | Performed by: NURSE PRACTITIONER

## 2021-03-22 PROCEDURE — 84702 CHORIONIC GONADOTROPIN TEST: CPT

## 2021-03-22 PROCEDURE — 36415 COLL VENOUS BLD VENIPUNCTURE: CPT

## 2021-03-22 PROCEDURE — 99202 OFFICE O/P NEW SF 15 MIN: CPT | Performed by: NURSE PRACTITIONER

## 2021-03-22 PROCEDURE — G8484 FLU IMMUNIZE NO ADMIN: HCPCS | Performed by: NURSE PRACTITIONER

## 2021-03-22 PROCEDURE — G8417 CALC BMI ABV UP PARAM F/U: HCPCS | Performed by: NURSE PRACTITIONER

## 2021-03-22 PROCEDURE — G8427 DOCREV CUR MEDS BY ELIG CLIN: HCPCS | Performed by: NURSE PRACTITIONER

## 2021-03-22 NOTE — TELEPHONE ENCOUNTER
Per CNP, patient advised of +quant hcg level. Patient agreeable to keeping intake and ultrasound as scheduled in April. Patient to call office with questions/concerns.

## 2021-03-22 NOTE — PROGRESS NOTES
Chantel Drew  3/22/2021    YOB: 1990          The patient was seen today. Here to establish care. 5th pregnancy. She is here regarding early pregnancy. Took 3 positive pregnancy tests at home. LMP . Approx 7 weeks gestation by LMP. Denies personal or family hx of neural tube defects. Patient's sister's baby with transposition of great arteries. Half brother's daughter with down syndrome and heart defect. Not a planned pregnancy but is happy about the pregnancy. Getting  in 30 days. Her bowels are regular and she is voiding without difficulty.      HPI:  Chantel Drew is a 31 y.o. female [id number]     Missed menses- early pregnancy      OB History    Para Term  AB Living   4 3 3 0 1 3   SAB TAB Ectopic Molar Multiple Live Births   1 0 0 0 0 3      # Outcome Date GA Lbr Abdifatah/2nd Weight Sex Delivery Anes PTL Lv   4 Term 13 40w0d  6 lb 7 oz (2.92 kg) M    WESLEY   3 Term 11 38w0d  6 lb 14 oz (3.118 kg) F  EPI N WESLEY      Birth Comments: 2 nd degree tear      Name: Donavan Turner   2 Term 04/11/10 41w0d  9 lb (4.082 kg) F CS-LTranv EPI N WESLEY      Birth Comments: Asynclitis      Name: Kristen Erickson   1 2008               Past Medical History:   Diagnosis Date    Allergic rhinitis     Class 2 obesity due to excess calories without serious comorbidity with body mass index (BMI) of 35.0 to 35.9 in adult 8/3/3595    Complication of anesthesia     epidural stopped working    History of Chiari malformation     Hypothyroidism     Migraines     Neuropathy     Osteoarthritis     POTS (postural orthostatic tachycardia syndrome)     Psoriasis     RAD (reactive airway disease)     triggered by bleach only    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     Tarlov cysts     [place] in [place]    Thyroid disease     Vasovagal syncope     Wears glasses        Past Surgical History:   Procedure Laterality Date    BACK SURGERY  2020 reconstructive, 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     SECTION  2010    EPIDURAL STEROID INJECTION Left 2019    EPIDURAL INJECTION s2 performed [facility] 2018    bilat facet block no steroid    NERVE BLOCK  2019    S2 bupivacaine injection    SPINE SURGERY      sacral cysts and then several surgeries to correct spinal fluid leaks    US DRAIN SOFT TISSUE ABSCESS  10/26/2020    US DRAIN SOFT TISSUE ABSCESS 10/26/2020 [facility]       Family History   Problem Relation Age of Onset    Diabetes Paternal Grandmother     Heart Failure Maternal Grandmother     Heart Attack Maternal Grandfather     Diabetes Father        Social History     Socioeconomic History    Marital status:      Spouse name: Not on file    Number of children: Not on file    Years of education: Not on file    Highest education level: Not on file   Occupational History    Not on file   Social Needs    Financial resource strain: Not on file    Food insecurity     Worry: Not on file     Inability: Not on file    Transportation needs     Medical: Not on file     Non-medical: Not on file   Tobacco Use    Smoking status: Never Smoker    Smokeless tobacco: Never Used   Substance and Sexual Activity    Alcohol use: No    Drug use: No    Sexual activity: Not Currently     Partners: Male   Lifestyle    Physical activity     Days per week: Not on file     Minutes per session: Not on file    Stress: Not on file   Relationships    Social connections     Talks on phone: Not on file     Gets together: Not on file     Attends Yazidism service: Not on file     Active member of club or organization: Not on file     Attends meetings of clubs or organizations: Not on file     Relationship status: Not on file    Intimate partner violence     Fear of current or ex partner: Not on file     Emotionally abused: Not on file     Physically abused: Not on file Forced sexual activity: Not on file   Other Topics Concern    Not on file   Social History Narrative    Not on file         MEDICATIONS:  Current Outpatient Medications   Medication Sig Dispense Refill    Prenatal Vit-Fe Fumarate-FA (PRENATAL VITAMINS PLUS PO) Take by mouth      levothyroxine (SYNTHROID) 75 MCG tablet Take 1 tablet by mouth daily 90 tablet 1    albuterol sulfate  (90 Base) MCG/ACT inhaler Inhale 2 puffs into the lungs every 6 hours as needed for Wheezing or Shortness of Breath (Patient not taking: Reported on 3/22/2021) 1 Inhaler 1    Respiratory Therapy Supplies (BREATHERITE VALVED MDI CHAMBER) RICHARD To be used with inhaler (Patient not taking: Reported on 3/22/2021) 1 Device 1     No current facility-administered medications for this visit. ALLERGIES:  Allergies as of 03/22/2021 - Review Complete 03/22/2021   Allergen Reaction Noted    Latex Hives and Swelling 04/22/2017    Other Shortness Of Breath 10/26/2020    Nut [peanut-containing drug products] Nausea And Vomiting 01/26/2018    Peanut (diagnostic)  01/26/2018         REVIEW OF SYSTEMS:    yes   A minimum of an eleven point review of systems was completed. Review Of Systems (11 point):  Constitutional: No fever, chills or malaise; No weight change or fatigue  Head and Eyes: No vision, Headache, Dizziness or trauma in last 12 months  ENT ROS: No hearing, Tinnitis, sinus or taste problems  Hematological and Lymphatic ROS:No Lymphoma, Von Willebrand's, Hemophillia or Bleeding History  Psych ROS: No Depression, Homicidal thoughts,suicidal thoughts, or anxiety  Breast ROS: No prior breast abnormalities or lumps  Respiratory ROS: No SOB, Pneumoniae,Cough, or Pulmonary Embolism History  Cardiovascular ROS: No Chest Pain with Exertion, Palpitations, Syncope, Edema, Arrhythmia  Gastrointestinal ROS: No Indigestion, Heartburn, Nausea, vomiting, Diarrhea, Constipation,or Bowel Changes;  No Bloody Stools or melena Genito-Urinary ROS: No Dysuria, Hematuria or Nocturia. No Urinary Incontinence or Vaginal Discharge  Musculoskeletal ROS: No Arthralgia, Arthritis,Gout,Osteoporosis or Rheumatism  Neurological ROS: No CVA, Migraines, Epilepsy, Seizure Hx, or Limb Weakness  Dermatological ROS: No Rash, Itching, Hives, Mole Changes or Cancer          Blood pressure 122/82, temperature 98.3 °F (36.8 °C), height 5' 10\" (1.778 m), weight 233 lb (105.7 kg), last menstrual period 01/31/2021, not currently breastfeeding. Chaperone for Intimate Exam  Chaperone was NA      Abdomen: Soft non-tender; good bowel sounds. No guarding, rebound or rigidity. No CVA tenderness bilaterally. Extremities: No calf tenderness, DTR 2/4, and No edema bilaterally    Pelvic: Exam deferred. Diagnostics:  No results found.     Lab Results:  Results for orders placed or performed during the hospital encounter of 03/17/21   CBC Auto Differential   Result Value Ref Range    WBC 7.0 3.5 - 11.0 k/uL    RBC 4.51 4.0 - 5.2 m/uL    Hemoglobin 13.3 12.0 - 16.0 g/dL    Hematocrit 38.9 36 - 46 %    MCV 86.3 80 - 100 fL    MCH 29.4 26 - 34 pg    MCHC 34.1 31 - 37 g/dL    RDW 13.5 11.5 - 14.9 %    Platelets 706 059 - 642 k/uL    MPV 7.8 6.0 - 12.0 fL    NRBC Automated NOT REPORTED per 100 WBC    Differential Type NOT REPORTED     Seg Neutrophils 67 (H) 36 - 66 %    Lymphocytes 25 24 - 44 %    Monocytes 7 1 - 7 %    Eosinophils % 1 0 - 4 %    Basophils 0 0 - 2 %    Immature Granulocytes NOT REPORTED 0 %    Segs Absolute 4.60 1.3 - 9.1 k/uL    Absolute Lymph # 1.70 1.0 - 4.8 k/uL    Absolute Mono # 0.50 0.1 - 1.3 k/uL    Absolute Eos # 0.10 0.0 - 0.4 k/uL    Basophils Absolute 0.00 0.0 - 0.2 k/uL    Absolute Immature Granulocyte NOT REPORTED 0.00 - 0.30 k/uL    WBC Morphology NOT REPORTED     RBC Morphology NOT REPORTED     Platelet Estimate NOT REPORTED    Comprehensive Metabolic Panel   Result Value Ref Range    Glucose 94 70 - 99 mg/dL    BUN 8 6 - 20 mg/dL CREATININE 0.75 0.50 - 0.90 mg/dL    Bun/Cre Ratio NOT REPORTED 9 - 20    Calcium 9.1 8.6 - 10.4 mg/dL    Sodium 134 (L) 135 - 144 mmol/L    Potassium 4.0 3.7 - 5.3 mmol/L    Chloride 103 98 - 107 mmol/L    CO2 22 20 - 31 mmol/L    Anion Gap 9 9 - 17 mmol/L    Alkaline Phosphatase 78 35 - 104 U/L    ALT 28 5 - 33 U/L    AST 18 <32 U/L    Total Bilirubin 0.76 0.3 - 1.2 mg/dL    Total Protein 6.5 6.4 - 8.3 g/dL    Albumin 3.8 3.5 - 5.2 g/dL    Albumin/Globulin Ratio NOT REPORTED 1.0 - 2.5    GFR Non-African American >60 >60 mL/min    GFR African American >60 >60 mL/min    GFR Comment          GFR Staging NOT REPORTED    Lipid Panel   Result Value Ref Range    Cholesterol 148 <200 mg/dL    HDL 42 >40 mg/dL    LDL Cholesterol 90 0 - 130 mg/dL    Chol/HDL Ratio 3.5 <5    Triglycerides 79 <150 mg/dL    VLDL NOT REPORTED 1 - 30 mg/dL   TSH without Reflex   Result Value Ref Range    TSH 4.66 0.30 - 5.00 mIU/L   Hepatitis C Antibody   Result Value Ref Range    Hepatitis C Ab NONREACTIVE NONREACTIVE   Urinalysis Reflex to Culture    Specimen: Urine, clean catch   Result Value Ref Range    Color, UA YELLOW YELLOW    Turbidity UA CLEAR CLEAR    Glucose, Ur NEGATIVE NEGATIVE    Bilirubin Urine NEGATIVE NEGATIVE    Ketones, Urine NEGATIVE NEGATIVE    Specific Gravity, UA 1.007 1.000 - 1.030    Urine Hgb NEGATIVE NEGATIVE    pH, UA 6.0 5.0 - 8.0    Protein, UA NEGATIVE NEGATIVE    Urobilinogen, Urine Normal Normal    Nitrite, Urine NEGATIVE NEGATIVE    Leukocyte Esterase, Urine NEGATIVE NEGATIVE    Urinalysis Comments       Microscopic exam not performed based on chemical results unless requested in original order. Assessment:   Diagnosis Orders   1. Missed menses  HCG, Quantitative, Pregnancy   2. H/O:       3.  (vaginal birth after ) X 2 ( and )     4. Family history of congenital heart defect     5.  Family history of Down syndrome       Patient Active Problem List    Diagnosis Date Noted Status:   Future     Standing Expiration Date:   3/22/2022           The patient, Isidro Xie is a 31 y.o. female, was seen with a total time spent of 25 minutes for the visit on this date of service by the E/M provider. The time component had both face to face and non face to face time spent in determining the total time component. Counseling and education regarding her diagnosis listed below and her options regarding those diagnoses were also included in determining her time component. Diagnosis Orders   1. Missed menses  HCG, Quantitative, Pregnancy   2. H/O:       3.  (vaginal birth after ) X 2 ( and )     4. Family history of congenital heart defect     5. Family history of Down syndrome          The patient had her preventative health maintenance recommendations and follow-up reviewed with her at the completion of her visit.

## 2021-03-22 NOTE — TELEPHONE ENCOUNTER
----- Message from DEAN Delvalle CNP sent at 3/22/2021  2:04 PM EDT -----  +quant HCG  Patient was seen in office today.   Please let patient know and encourage patient to keep follow up visit in 2 weeks

## 2021-04-12 ENCOUNTER — INITIAL PRENATAL (OUTPATIENT)
Dept: OBGYN CLINIC | Age: 31
End: 2021-04-12
Payer: MEDICARE

## 2021-04-12 ENCOUNTER — PROCEDURE VISIT (OUTPATIENT)
Dept: OBGYN CLINIC | Age: 31
End: 2021-04-12
Payer: MEDICARE

## 2021-04-12 VITALS — WEIGHT: 232 LBS | SYSTOLIC BLOOD PRESSURE: 122 MMHG | BODY MASS INDEX: 33.29 KG/M2 | DIASTOLIC BLOOD PRESSURE: 80 MMHG

## 2021-04-12 DIAGNOSIS — Z83.3 FAMILY HISTORY OF DIABETES MELLITUS IN FATHER: ICD-10-CM

## 2021-04-12 DIAGNOSIS — E03.8 SUBCLINICAL HYPOTHYROIDISM: ICD-10-CM

## 2021-04-12 DIAGNOSIS — Z82.79 FAMILY HISTORY OF DOWN SYNDROME: ICD-10-CM

## 2021-04-12 DIAGNOSIS — Z82.79 FAMILY HISTORY OF DOWN SYNDROME: Primary | ICD-10-CM

## 2021-04-12 DIAGNOSIS — Z34.90 EARLY STAGE OF PREGNANCY: Primary | ICD-10-CM

## 2021-04-12 DIAGNOSIS — Z98.890 HISTORY OF BACK SURGERY: ICD-10-CM

## 2021-04-12 DIAGNOSIS — Z86.69 HISTORY OF CHIARI MALFORMATION: ICD-10-CM

## 2021-04-12 DIAGNOSIS — G90.A POTS (POSTURAL ORTHOSTATIC TACHYCARDIA SYNDROME): ICD-10-CM

## 2021-04-12 DIAGNOSIS — Z82.79 FAMILY HISTORY OF CONGENITAL HEART DEFECT: ICD-10-CM

## 2021-04-12 DIAGNOSIS — O36.80X0 PREGNANCY WITH INCONCLUSIVE FETAL VIABILITY, SINGLE OR UNSPECIFIED FETUS: Primary | ICD-10-CM

## 2021-04-12 LAB
CRL: NORMAL
SAC DIAMETER: NORMAL

## 2021-04-12 PROCEDURE — H1000 PRENATAL CARE ATRISK ASSESSM: HCPCS | Performed by: NURSE PRACTITIONER

## 2021-04-12 PROCEDURE — 76801 OB US < 14 WKS SINGLE FETUS: CPT | Performed by: OBSTETRICS & GYNECOLOGY

## 2021-04-12 NOTE — PROGRESS NOTES
screening, referral placed to [facility] for consultation by CNP. Patient also reports a heart defect in her sister's daughter present at birth. Referral placed by front office staff to [facility]. New ob consent forms signed. Patient declined cystic fibrosis and first trimester screening. See media files for details. First trimester labs ordered/cosigned by CNP. Patient declined ob education bag, not provided at patient's request. Patient encouraged to complete labs prior to initial prenatal visit, which was scheduled upon checkout. Patient advised to call the office with questions/concerns.

## 2021-04-26 ENCOUNTER — HOSPITAL ENCOUNTER (OUTPATIENT)
Age: 31
Discharge: HOME | End: 2021-04-26
Payer: MEDICARE

## 2021-04-26 DIAGNOSIS — Z34.90 EARLY STAGE OF PREGNANCY: ICD-10-CM

## 2021-04-26 LAB
-: ABNORMAL
ABO/RH: NORMAL
ABSOLUTE EOS #: 0.1 K/UL (ref 0–0.4)
ABSOLUTE IMMATURE GRANULOCYTE: ABNORMAL K/UL (ref 0–0.3)
ABSOLUTE LYMPH #: 1.2 K/UL (ref 1–4.8)
ABSOLUTE MONO #: 0.4 K/UL (ref 0.1–1.3)
AMORPHOUS: ABNORMAL
ANTIBODY SCREEN: NEGATIVE
BACTERIA: ABNORMAL
BASOPHILS # BLD: 0 % (ref 0–2)
BASOPHILS ABSOLUTE: 0 K/UL (ref 0–0.2)
BILIRUBIN URINE: NEGATIVE
CASTS UA: ABNORMAL /LPF
COLOR: YELLOW
COMMENT UA: ABNORMAL
CRYSTALS, UA: ABNORMAL /HPF
DIFFERENTIAL TYPE: ABNORMAL
EOSINOPHILS RELATIVE PERCENT: 1 % (ref 0–4)
EPITHELIAL CELLS UA: ABNORMAL /HPF
GLUCOSE ADMINISTRATION: ABNORMAL
GLUCOSE BLD-MCNC: 150 MG/DL (ref 70–99)
GLUCOSE TOLERANCE SCREEN 50G: 150 MG/DL (ref 70–135)
GLUCOSE URINE: NEGATIVE
HCT VFR BLD CALC: 37.7 % (ref 36–46)
HEMOGLOBIN: 12.8 G/DL (ref 12–16)
HEPATITIS B SURFACE ANTIGEN: NONREACTIVE
HIV AG/AB: NONREACTIVE
IMMATURE GRANULOCYTES: ABNORMAL %
KETONES, URINE: NEGATIVE
LEUKOCYTE ESTERASE, URINE: ABNORMAL
LYMPHOCYTES # BLD: 18 % (ref 24–44)
MCH RBC QN AUTO: 29.2 PG (ref 26–34)
MCHC RBC AUTO-ENTMCNC: 33.8 G/DL (ref 31–37)
MCV RBC AUTO: 86.5 FL (ref 80–100)
MONOCYTES # BLD: 5 % (ref 1–7)
MUCUS: ABNORMAL
NITRITE, URINE: NEGATIVE
NRBC AUTOMATED: ABNORMAL PER 100 WBC
OTHER OBSERVATIONS UA: ABNORMAL
PDW BLD-RTO: 13.8 % (ref 11.5–14.9)
PH UA: 7 (ref 5–8)
PLATELET # BLD: 204 K/UL (ref 150–450)
PLATELET ESTIMATE: ABNORMAL
PMV BLD AUTO: 7.7 FL (ref 6–12)
PROTEIN UA: NEGATIVE
RBC # BLD: 4.36 M/UL (ref 4–5.2)
RBC # BLD: ABNORMAL 10*6/UL
RBC UA: ABNORMAL /HPF
RENAL EPITHELIAL, UA: ABNORMAL /HPF
RUBV IGG SER QL: 178.6 IU/ML
SEG NEUTROPHILS: 76 % (ref 36–66)
SEGMENTED NEUTROPHILS ABSOLUTE COUNT: 5 K/UL (ref 1.3–9.1)
SPECIFIC GRAVITY UA: 1.01 (ref 1–1.03)
T. PALLIDUM, IGG: NONREACTIVE
TOXOPLASM IGM: 0.16 INDEX
TOXOPLASMA BLOOD FOR RATIO: <0.5 IU/ML
TRICHOMONAS: ABNORMAL
TURBIDITY: CLEAR
URINE HGB: NEGATIVE
UROBILINOGEN, URINE: NORMAL
WBC # BLD: 6.6 K/UL (ref 3.5–11)
WBC # BLD: ABNORMAL 10*3/UL
WBC UA: ABNORMAL /HPF
YEAST: ABNORMAL

## 2021-04-26 PROCEDURE — 86762 RUBELLA ANTIBODY: CPT

## 2021-04-26 PROCEDURE — 86780 TREPONEMA PALLIDUM: CPT

## 2021-04-26 PROCEDURE — 87389 HIV-1 AG W/HIV-1&-2 AB AG IA: CPT

## 2021-04-26 PROCEDURE — 36415 COLL VENOUS BLD VENIPUNCTURE: CPT

## 2021-04-26 PROCEDURE — 87340 HEPATITIS B SURFACE AG IA: CPT

## 2021-04-26 PROCEDURE — 86778 TOXOPLASMA ANTIBODY IGM: CPT

## 2021-04-26 PROCEDURE — 81001 URINALYSIS AUTO W/SCOPE: CPT

## 2021-04-26 PROCEDURE — 82950 GLUCOSE TEST: CPT

## 2021-04-26 PROCEDURE — 86777 TOXOPLASMA ANTIBODY: CPT

## 2021-04-26 PROCEDURE — 82947 ASSAY GLUCOSE BLOOD QUANT: CPT

## 2021-04-26 PROCEDURE — 86850 RBC ANTIBODY SCREEN: CPT

## 2021-04-26 PROCEDURE — 86901 BLOOD TYPING SEROLOGIC RH(D): CPT

## 2021-04-26 PROCEDURE — 86900 BLOOD TYPING SEROLOGIC ABO: CPT

## 2021-04-26 PROCEDURE — 85025 COMPLETE CBC W/AUTO DIFF WBC: CPT

## 2021-05-03 ENCOUNTER — HOSPITAL ENCOUNTER (OUTPATIENT)
Age: 31
Setting detail: SPECIMEN
Discharge: HOME | End: 2021-05-03
Payer: MEDICARE

## 2021-05-03 ENCOUNTER — ROUTINE PRENATAL (OUTPATIENT)
Dept: OBGYN CLINIC | Age: 31
End: 2021-05-03
Payer: MEDICARE

## 2021-05-03 VITALS
SYSTOLIC BLOOD PRESSURE: 118 MMHG | HEART RATE: 89 BPM | BODY MASS INDEX: 33 KG/M2 | WEIGHT: 230 LBS | DIASTOLIC BLOOD PRESSURE: 78 MMHG

## 2021-05-03 DIAGNOSIS — Z82.79 FAMILY HISTORY OF DOWN SYNDROME: Primary | ICD-10-CM

## 2021-05-03 DIAGNOSIS — M51.26 LUMBAR DISC HERNIATION: Chronic | ICD-10-CM

## 2021-05-03 DIAGNOSIS — E03.8 SUBCLINICAL HYPOTHYROIDISM: ICD-10-CM

## 2021-05-03 DIAGNOSIS — Z3A.13 13 WEEKS GESTATION OF PREGNANCY: ICD-10-CM

## 2021-05-03 DIAGNOSIS — Z82.79 FAMILY HISTORY OF CONGENITAL HEART DEFECT: ICD-10-CM

## 2021-05-03 DIAGNOSIS — G90.A POTS (POSTURAL ORTHOSTATIC TACHYCARDIA SYNDROME): ICD-10-CM

## 2021-05-03 DIAGNOSIS — G96.198 CYST OF SPINAL MENINGES: ICD-10-CM

## 2021-05-03 DIAGNOSIS — M43.16 SPONDYLOLISTHESIS AT L4-L5 LEVEL: ICD-10-CM

## 2021-05-03 DIAGNOSIS — Z98.890 HISTORY OF BACK SURGERY: ICD-10-CM

## 2021-05-03 DIAGNOSIS — E03.9 HYPOTHYROIDISM, UNSPECIFIED TYPE: ICD-10-CM

## 2021-05-03 DIAGNOSIS — E66.09 CLASS 2 OBESITY DUE TO EXCESS CALORIES WITHOUT SERIOUS COMORBIDITY WITH BODY MASS INDEX (BMI) OF 35.0 TO 35.9 IN ADULT: ICD-10-CM

## 2021-05-03 DIAGNOSIS — G96.191 TARLOV CYSTS: ICD-10-CM

## 2021-05-03 DIAGNOSIS — Z3A.13 13 WEEKS GESTATION OF PREGNANCY: Primary | ICD-10-CM

## 2021-05-03 DIAGNOSIS — Z86.69 HISTORY OF CHIARI MALFORMATION: ICD-10-CM

## 2021-05-03 DIAGNOSIS — Z82.79 FAMILY HISTORY OF DOWN SYNDROME: ICD-10-CM

## 2021-05-03 DIAGNOSIS — R73.09 ABNORMAL GLUCOSE TOLERANCE TEST (GTT): ICD-10-CM

## 2021-05-03 PROCEDURE — G8417 CALC BMI ABV UP PARAM F/U: HCPCS | Performed by: NURSE PRACTITIONER

## 2021-05-03 PROCEDURE — 1036F TOBACCO NON-USER: CPT | Performed by: NURSE PRACTITIONER

## 2021-05-03 PROCEDURE — 87491 CHLMYD TRACH DNA AMP PROBE: CPT

## 2021-05-03 PROCEDURE — G8427 DOCREV CUR MEDS BY ELIG CLIN: HCPCS | Performed by: NURSE PRACTITIONER

## 2021-05-03 PROCEDURE — 87070 CULTURE OTHR SPECIMN AEROBIC: CPT

## 2021-05-03 PROCEDURE — 87591 N.GONORRHOEAE DNA AMP PROB: CPT

## 2021-05-03 PROCEDURE — G0145 SCR C/V CYTO,THINLAYER,RESCR: HCPCS

## 2021-05-03 PROCEDURE — 87624 HPV HI-RISK TYP POOLED RSLT: CPT

## 2021-05-03 PROCEDURE — 99214 OFFICE O/P EST MOD 30 MIN: CPT | Performed by: NURSE PRACTITIONER

## 2021-05-03 RX ORDER — LEVOTHYROXINE SODIUM 0.1 MG/1
100 TABLET ORAL DAILY
Qty: 30 TABLET | Refills: 1 | Status: SHIPPED | OUTPATIENT
Start: 2021-05-03 | End: 2021-07-02
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
## 2021-05-04 LAB
C TRACH DNA GENITAL QL NAA+PROBE: NEGATIVE
N. GONORRHOEAE DNA: NEGATIVE
SPECIMEN DESCRIPTION: NORMAL

## 2021-05-05 LAB
HPV SAMPLE: NORMAL
HPV, GENOTYPE 16: NOT DETECTED
HPV, GENOTYPE 18: NOT DETECTED
HPV, HIGH RISK OTHER: NOT DETECTED
HPV, INTERPRETATION: NORMAL
SPECIMEN DESCRIPTION: NORMAL

## 2021-05-06 LAB
CULTURE: NORMAL
CYTOLOGY REPORT: NORMAL
Lab: NORMAL
SPECIMEN DESCRIPTION: NORMAL

## 2021-05-07 ENCOUNTER — HOSPITAL ENCOUNTER (EMERGENCY)
Age: 31
Discharge: HOME | End: 2021-05-08
Attending: EMERGENCY MEDICINE
Payer: MEDICARE

## 2021-05-07 VITALS
DIASTOLIC BLOOD PRESSURE: 83 MMHG | HEART RATE: 94 BPM | TEMPERATURE: 96.6 F | OXYGEN SATURATION: 99 % | SYSTOLIC BLOOD PRESSURE: 132 MMHG | RESPIRATION RATE: 20 BRPM | HEIGHT: 65 IN | BODY MASS INDEX: 38.32 KG/M2 | WEIGHT: 230 LBS

## 2021-05-07 DIAGNOSIS — O46.90 VAGINAL BLEEDING IN PREGNANCY: Primary | ICD-10-CM

## 2021-05-07 DIAGNOSIS — N39.0 URINARY TRACT INFECTION WITHOUT HEMATURIA, SITE UNSPECIFIED: ICD-10-CM

## 2021-05-07 LAB
ABSOLUTE EOS #: 0.11 K/UL (ref 0–0.44)
ABSOLUTE IMMATURE GRANULOCYTE: 0.04 K/UL (ref 0–0.3)
ABSOLUTE LYMPH #: 2 K/UL (ref 1.1–3.7)
ABSOLUTE MONO #: 0.61 K/UL (ref 0.1–1.2)
BASOPHILS # BLD: 0 % (ref 0–2)
BASOPHILS ABSOLUTE: 0.03 K/UL (ref 0–0.2)
DIFFERENTIAL TYPE: ABNORMAL
EOSINOPHILS RELATIVE PERCENT: 1 % (ref 1–4)
HCT VFR BLD CALC: 43.2 % (ref 36.3–47.1)
HEMOGLOBIN: 13.3 G/DL (ref 11.9–15.1)
IMMATURE GRANULOCYTES: 0 %
LYMPHOCYTES # BLD: 21 % (ref 24–43)
MCH RBC QN AUTO: 29.5 PG (ref 25.2–33.5)
MCHC RBC AUTO-ENTMCNC: 30.8 G/DL (ref 28.4–34.8)
MCV RBC AUTO: 95.8 FL (ref 82.6–102.9)
MONOCYTES # BLD: 6 % (ref 3–12)
NRBC AUTOMATED: 0 PER 100 WBC
PDW BLD-RTO: 13.1 % (ref 11.8–14.4)
PLATELET # BLD: ABNORMAL K/UL (ref 138–453)
PLATELET ESTIMATE: ABNORMAL
PLATELET, FLUORESCENCE: 201 K/UL (ref 138–453)
PLATELET, IMMATURE FRACTION: 2 % (ref 1.1–10.3)
PMV BLD AUTO: ABNORMAL FL (ref 8.1–13.5)
RBC # BLD: 4.51 M/UL (ref 3.95–5.11)
RBC # BLD: ABNORMAL 10*6/UL
SEG NEUTROPHILS: 71 % (ref 36–65)
SEGMENTED NEUTROPHILS ABSOLUTE COUNT: 6.69 K/UL (ref 1.5–8.1)
WBC # BLD: 9.5 K/UL (ref 3.5–11.3)
WBC # BLD: ABNORMAL 10*3/UL

## 2021-05-07 PROCEDURE — 99285 EMERGENCY DEPT VISIT HI MDM: CPT

## 2021-05-07 PROCEDURE — 87660 TRICHOMONAS VAGIN DIR PROBE: CPT

## 2021-05-07 PROCEDURE — 87086 URINE CULTURE/COLONY COUNT: CPT

## 2021-05-07 PROCEDURE — 87480 CANDIDA DNA DIR PROBE: CPT

## 2021-05-07 PROCEDURE — 87491 CHLMYD TRACH DNA AMP PROBE: CPT

## 2021-05-07 PROCEDURE — 85055 RETICULATED PLATELET ASSAY: CPT

## 2021-05-07 PROCEDURE — 87591 N.GONORRHOEAE DNA AMP PROB: CPT

## 2021-05-07 PROCEDURE — 85025 COMPLETE CBC W/AUTO DIFF WBC: CPT

## 2021-05-07 PROCEDURE — 87510 GARDNER VAG DNA DIR PROBE: CPT

## 2021-05-08 PROBLEM — R03.0 ELEVATED BLOOD PRESSURE READING: Status: ACTIVE | Noted: 2021-05-08

## 2021-05-08 LAB
-: ABNORMAL
AMORPHOUS: ABNORMAL
BACTERIA: ABNORMAL
BILIRUBIN URINE: NEGATIVE
CASTS UA: ABNORMAL /LPF (ref 0–2)
COLOR: ABNORMAL
COMMENT UA: ABNORMAL
CRYSTALS, UA: ABNORMAL /HPF
CULTURE: NORMAL
DIRECT EXAM: NORMAL
EPITHELIAL CELLS UA: ABNORMAL /HPF (ref 0–5)
GLUCOSE URINE: NEGATIVE
KETONES, URINE: NEGATIVE
LEUKOCYTE ESTERASE, URINE: ABNORMAL
Lab: NORMAL
Lab: NORMAL
MUCUS: ABNORMAL
NITRITE, URINE: NEGATIVE
OTHER OBSERVATIONS UA: ABNORMAL
PH UA: 7 (ref 5–8)
PROTEIN UA: ABNORMAL
RBC UA: ABNORMAL /HPF (ref 0–2)
RENAL EPITHELIAL, UA: ABNORMAL /HPF
SPECIFIC GRAVITY UA: 1 (ref 1–1.03)
SPECIMEN DESCRIPTION: NORMAL
SPECIMEN DESCRIPTION: NORMAL
TRICHOMONAS: ABNORMAL
TURBIDITY: CLEAR
URINE HGB: ABNORMAL
UROBILINOGEN, URINE: NORMAL
WBC UA: ABNORMAL /HPF (ref 0–5)
YEAST: ABNORMAL

## 2021-05-08 PROCEDURE — 81001 URINALYSIS AUTO W/SCOPE: CPT

## 2021-05-08 RX ORDER — CEPHALEXIN 500 MG/1
500 CAPSULE ORAL 3 TIMES DAILY
Qty: 21 CAPSULE | Refills: 0 | Status: SHIPPED | OUTPATIENT
Start: 2021-05-08 | End: 2021-05-15

## 2021-05-08 ASSESSMENT — ENCOUNTER SYMPTOMS
NAUSEA: 0
RHINORRHEA: 0
DIARRHEA: 0
COUGH: 0
ABDOMINAL PAIN: 1
SHORTNESS OF BREATH: 0
BACK PAIN: 1
VOMITING: 0

## 2021-05-08 NOTE — ED PROVIDER NOTES
[facility]  ED  Emergency Department Encounter  Emergency Medicine Resident     Pt Name: Holli Crisostomo  MRN: [id number]  [place] 1990  Date of evaluation: 21  PCP:  Monica Cortes MD    [address] [place] [address]       Chief Complaint   Patient presents with    Vaginal Bleeding     14 wks pregnant       HISTORY OFPRESENT ILLNESS  (Location/Symptom, Timing/Onset, Context/Setting, Quality, Duration, Modifying Factors,Severity.)      Holli Crisostomo is a 27 y.o. female G5, P3 at 14 weeks gestation who presents with vaginal bleeding that began this evening. Patient states she arrived home this evening after meeting with her kids and noticed vaginal bleeding. She states it is more than a period for her, although she has light periods. No passage of clots or tissue. She also had low back pain and mild suprapubic pressure at that time. This discomfort has continued to the current time. Patient has a past medical history significant for POTS, chiari malformation, hypothyroidism. She often experiences shortness of breath and chest discomfort from POTS that has not changed. She has not had any complications with this pregnancy. She follows with physician at [facility]. No recent illness, no other complaints this time. 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 has a past surgical history that includes  section (2010); Nerve Block (Bilateral, 2018);  Nerve Block Eyes:      Extraocular Movements: Extraocular movements intact. Pupils: Pupils are equal, round, and reactive to light. Neck:      Musculoskeletal: Normal range of motion and neck supple. Cardiovascular:      Rate and Rhythm: Normal rate and regular rhythm. Heart sounds: Normal heart sounds. No murmur. Pulmonary:      Effort: Pulmonary effort is normal. No respiratory distress. Breath sounds: Normal breath sounds. No wheezing, rhonchi or rales. Abdominal:      General: There is no distension. Palpations: Abdomen is soft. Tenderness: There is no abdominal tenderness. There is no guarding or rebound. Musculoskeletal: Normal range of motion. General: No tenderness. Right lower leg: No edema. Left lower leg: No edema. Skin:     General: Skin is warm and dry. Neurological:      General: No focal deficit present. Mental Status: She is alert and oriented to person, place, and time. Motor: No weakness. Psychiatric:         Mood and Affect: Mood normal.         DIFFERENTIAL  DIAGNOSIS     PLAN (LABS / IMAGING / EKG):  Orders Placed This Encounter   Procedures    Culture, Urine    VAGINITIS DNA PROBE    C.trachomatis N.gonorrhoeae DNA    CBC Auto Differential    Urinalysis, reflex to microscopic    Immature Platelet Fraction    Microscopic Urinalysis    Inpatient consult to Obstetrics / Gynecology       MEDICATIONS ORDERED:  Orders Placed This Encounter   Medications    cephALEXin (KEFLEX) 500 MG capsule     Sig: Take 1 capsule by mouth 3 times daily for 7 days     Dispense:  21 capsule     Refill:  0       Initial MDM/Plan/ED COURSE:    27 y.o. female G5, P3 at 15 weeks gestation who presents with vaginal bleeding that began this evening. She has associated suprapubic pressure, and low back pain. On exam, she is in no acute distress.   Vitals are within normal limits, initial blood pressure was taken right after patient walked back to the room and was slightly elevated at 142/92. Repeat was 132/83. No history of blood pressure issues. ED Course as of May 08 0818   Sat May 08, 2021   0014 CBC unremarkable, hemoglobin stable at 13.3.    [JS]   0014 Bedside ultrasound was performed on initial evaluation. Fetal heart rate was 158, there is good fetal movement. Placenta appears intact. [JS]   0014 Pelvic exam was performed. Small amount of blood present, no active or large amount of bleeding. Cervix appears closed. [JS]      ED Course User Index  [JS] Fátima Llanes DO     OB team evaluated the patient. She was counseled on follow-up in return precautions. Exam is otherwise reassuring at this time. Patient to be discharged with close follow-up with her OB in the next day or 2. Questions were answered.:     DIAGNOSTIC RESULTS / EMERGENCYDEPARTMENT COURSE / MDM     LABS:  Labs Reviewed   CBC WITH AUTO DIFFERENTIAL - Abnormal; Notable for the following components:       Result Value    Seg Neutrophils 71 (*)     Lymphocytes 21 (*)     All other components within normal limits   URINALYSIS - Abnormal; Notable for the following components:    Color, UA RED (*)     Urine Hgb LARGE (*)     Protein, UA TRACE (*)     Leukocyte Esterase, Urine SMALL (*)     All other components within normal limits   MICROSCOPIC URINALYSIS - Abnormal; Notable for the following components:    Bacteria, UA FEW (*)     All other components within normal limits   VAGINITIS DNA PROBE   CULTURE, URINE   C.TRACHOMATIS N.GONORRHOEAE DNA   IMMATURE PLATELET FRACTION           No results found. EKG  Not indicated     All EKG's are interpreted by the Emergency Department Physicianwho either signs or Co-signs this chart in the absence of a cardiologist.      PROCEDURES:  None    CONSULTS:  IP CONSULT TO OB GYN    CRITICAL CARE:  Please see attending note    FINAL IMPRESSION      1. Vaginal bleeding in pregnancy    2.  Urinary tract infection without hematuria, site
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
## 2021-05-08 NOTE — ED PROVIDER NOTES
[facility] [facility]     Emergency Department     Faculty Attestation        I performed a history and physical examination of the patient and discussed management with the resident. I reviewed the residents note and agree with the documented findings and plan of care. Any areas of disagreement are noted on the chart. I was personally present for the key portions of any procedures. I have documented in the chart those procedures where I was not present during the key portions. I have reviewed the emergency nurses triage note. I agree with the chief complaint, past medical history, past surgical history, allergies, medications, social and family history as documented unless otherwise noted below. For Physician Assistant/ Nurse Practitioner cases/documentation I have personally evaluated this patient and have completed at least one if not all key elements of the E/M (history, physical exam, and MDM). Additional findings are as noted. Vital Signs: BP: 132/83  Pulse: 94  Resp: 20  Temp: 96.6 °F (35.9 °C) SpO2: 99 %  PCP:  Hanna Luna MD    Pertinent Comments:     Patient is a 65-year-old female who is approximately 12 weeks gestational age pregnant. Does have blood type of A+. Patient noted some bleeding occurring tonight but no actual abdominal pain although did have some low back discomfort and lower pressure. Denies any clots or passage of tissue. Bedside ultrasound shows intact fetal heart rate as well as movement. Assessment/plan: Vaginal bleeding in setting of pregnancy less than 20 weeks. Will obtain CBC as well as UA/urine culture and awaiting pelvic exam by resident. Critical Care  None    This patient was evaluated in the Emergency Department for symptoms described in the history of present illness.  He/she was evaluated in the context of the global COVID-19 pandemic, which necessitated consideration that the patient might be at risk for infection with the SARS-CoV-2 virus that causes COVID-19. Institutional protocols and algorithms that pertain to the evaluation of patients at risk for COVID-19 are in a state of rapid change based on information released by regulatory bodies including the CDC and federal and state organizations. These policies and algorithms were followed during the patient's care in the ED. (Please note that portions of this note were completed with a voice recognition program. Efforts were made to edit the dictations but occasionally words are mis-transcribed.  Whenever words are used in this note in any gender, they shall be construed as though they were used in the gender appropriate to the circumstances; and whenever words are used in this note in the singular or plural form, they shall be construed as though they were used in the form appropriate to the circumstances.)    MD Brodie Perez  Attending Emergency Medicine Physician             Arloa Babinski, MD  05/07/21 [id number]

## 2021-05-10 ENCOUNTER — CARE COORDINATION (OUTPATIENT)
Dept: CARE COORDINATION | Age: 31
End: 2021-05-10

## 2021-05-10 ENCOUNTER — HOSPITAL ENCOUNTER (EMERGENCY)
Age: 31
Discharge: HOME | End: 2021-05-11
Attending: EMERGENCY MEDICINE
Payer: MEDICARE

## 2021-05-10 ENCOUNTER — ROUTINE PRENATAL (OUTPATIENT)
Dept: OBGYN CLINIC | Age: 31
End: 2021-05-10
Payer: MEDICARE

## 2021-05-10 VITALS
SYSTOLIC BLOOD PRESSURE: 134 MMHG | TEMPERATURE: 98.1 F | HEART RATE: 98 BPM | OXYGEN SATURATION: 100 % | RESPIRATION RATE: 17 BRPM | DIASTOLIC BLOOD PRESSURE: 94 MMHG

## 2021-05-10 VITALS
SYSTOLIC BLOOD PRESSURE: 122 MMHG | WEIGHT: 228 LBS | BODY MASS INDEX: 37.94 KG/M2 | DIASTOLIC BLOOD PRESSURE: 80 MMHG | HEART RATE: 100 BPM

## 2021-05-10 DIAGNOSIS — N89.8 VAGINAL DISCHARGE DURING PREGNANCY IN FIRST TRIMESTER: Primary | ICD-10-CM

## 2021-05-10 DIAGNOSIS — R73.09 ABNORMAL GLUCOSE TOLERANCE TEST (GTT): ICD-10-CM

## 2021-05-10 DIAGNOSIS — Z82.79 FAMILY HISTORY OF CONGENITAL HEART DEFECT: ICD-10-CM

## 2021-05-10 DIAGNOSIS — O26.891 VAGINAL DISCHARGE DURING PREGNANCY IN FIRST TRIMESTER: Primary | ICD-10-CM

## 2021-05-10 DIAGNOSIS — Z86.69 HISTORY OF CHIARI MALFORMATION: ICD-10-CM

## 2021-05-10 DIAGNOSIS — Z83.3 FAMILY HISTORY OF DIABETES MELLITUS IN FATHER: ICD-10-CM

## 2021-05-10 DIAGNOSIS — Z3A.14 14 WEEKS GESTATION OF PREGNANCY: ICD-10-CM

## 2021-05-10 DIAGNOSIS — G96.191 TARLOV CYSTS: ICD-10-CM

## 2021-05-10 DIAGNOSIS — O09.90 HIGH RISK PREGNANCY, ANTEPARTUM: Primary | ICD-10-CM

## 2021-05-10 DIAGNOSIS — Z82.79 FAMILY HISTORY OF DOWN SYNDROME: ICD-10-CM

## 2021-05-10 DIAGNOSIS — Z98.890 HISTORY OF BACK SURGERY: ICD-10-CM

## 2021-05-10 DIAGNOSIS — O34.219 VBAC (VAGINAL BIRTH AFTER CESAREAN): ICD-10-CM

## 2021-05-10 DIAGNOSIS — Z98.891 H/O: C-SECTION: ICD-10-CM

## 2021-05-10 DIAGNOSIS — G90.A POTS (POSTURAL ORTHOSTATIC TACHYCARDIA SYNDROME): ICD-10-CM

## 2021-05-10 DIAGNOSIS — E03.9 HYPOTHYROIDISM, UNSPECIFIED TYPE: ICD-10-CM

## 2021-05-10 PROCEDURE — 1036F TOBACCO NON-USER: CPT | Performed by: NURSE PRACTITIONER

## 2021-05-10 PROCEDURE — 99285 EMERGENCY DEPT VISIT HI MDM: CPT

## 2021-05-10 PROCEDURE — G8427 DOCREV CUR MEDS BY ELIG CLIN: HCPCS | Performed by: NURSE PRACTITIONER

## 2021-05-10 PROCEDURE — 99213 OFFICE O/P EST LOW 20 MIN: CPT | Performed by: NURSE PRACTITIONER

## 2021-05-10 PROCEDURE — G8417 CALC BMI ABV UP PARAM F/U: HCPCS | Performed by: NURSE PRACTITIONER

## 2021-05-10 ASSESSMENT — PAIN SCALES - GENERAL: PAINLEVEL_OUTOF10: 6
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
## 2021-05-10 NOTE — CARE COORDINATION
Patient contacted regarding recent discharge and COVID-19 risk. Discussed COVID-19 related testing which was not done at this time. Test results were N/A. Patient informed of results, if available? N/A     Care Transition Nurse/ Ambulatory Care Manager contacted the patient by telephone to perform post discharge assessment. Verified name and  with patient as identifiers. Patient has following risk factors of: no known risk factors. CTN/ACM reviewed discharge instructions, medical action plan and red flags related to discharge diagnosis. Reviewed and educated them on any new and changed medications related to discharge diagnosis. Advised obtaining a 90-day supply of all daily and as-needed medications. Education provided regarding infection prevention, and signs and symptoms of COVID-19 and when to seek medical attention with patient who verbalized understanding. Discussed exposure protocols and quarantine from [address] you at higher risk for severe illness  and given an opportunity for questions and concerns. The patient agrees to contact the COVID-19 hotline [phone] or PCP office for questions related to their healthcare. CTN/ACM provided contact information for future reference. From CDC: Are you at higher risk for severe illness?  Wash your hands often.  Avoid close contact (6 feet, which is about two arm lengths) with people who are sick.  Put distance between yourself and other people if COVID-19 is spreading in your community.  Clean and disinfect frequently touched surfaces.  Avoid all cruise travel and non-essential air travel.  Call your healthcare professional if you have concerns about COVID-19 and your underlying condition or if you are sick. For more information on steps you can take to protect yourself, see CDC's How to Protect Yourself    No further follow up required based on severity of symptoms and risk factors.     Advance Care Planning  People You should restrict contact with pets and other animals while you are sick with COVID-19, just like you would around other people. Although there have not been reports of pets or other animals becoming sick with COVID-19, it is still recommended that people sick with COVID-19 limit contact with animals until more information is known about the virus. When possible, have another member of your household care for your animals while you are sick. If you are sick with COVID-19, avoid contact with your pet, including petting, snuggling, being kissed or licked, and sharing food. If you must care for your pet or be around animals while you are sick, wash your hands before and after you interact with pets and wear a facemask. Call ahead before visiting your doctor  If you have a medical appointment, call the healthcare provider and tell them that you have or may have COVID-19. This will help the healthcare providers office take steps to keep other people from getting infected or exposed. Wear a facemask  You should wear a facemask when you are around other people (e.g., sharing a room or vehicle) or pets and before you enter a healthcare providers office. If you are not able to wear a facemask (for example, because it causes trouble breathing), then people who live with you should not stay in the same room with you, or they should wear a facemask if they enter your room. Cover your coughs and sneezes  Cover your mouth and nose with a tissue when you cough or sneeze. Throw used tissues in a lined trash can. Immediately wash your hands with soap and water for at least 20 seconds or, if soap and water are not available, clean your hands with an alcohol-based hand  that contains at least 60% alcohol. Clean your hands often  Wash your hands often with soap and water for at least 20 seconds, especially after blowing your nose, coughing, or sneezing; going to the bathroom; and before eating or preparing food.  If soap and water are not readily available, use an alcohol-based hand  with at least 60% alcohol, covering all surfaces of your hands and rubbing them together until they feel dry. Soap and water are the best option if hands are visibly dirty. Avoid touching your eyes, nose, and mouth with unwashed hands. Avoid sharing personal household items  You should not share dishes, drinking glasses, cups, eating utensils, towels, or bedding with other people or pets in your home. After using these items, they should be washed thoroughly with soap and water. Clean all high-touch surfaces everyday  High touch surfaces include counters, tabletops, doorknobs, bathroom fixtures, toilets, phones, keyboards, tablets, and bedside tables. Also, clean any surfaces that may have blood, stool, or body fluids on them. Use a household cleaning spray or wipe, according to the label instructions. Labels contain instructions for safe and effective use of the cleaning product including precautions you should take when applying the product, such as wearing gloves and making sure you have good ventilation during use of the product. Monitor your symptoms  Seek prompt medical attention if your illness is worsening (e.g., difficulty breathing). Before seeking care, call your healthcare provider and tell them that you have, or are being evaluated for, COVID-19. Put on a facemask before you enter the facility. These steps will help the healthcare providers office to keep other people in the office or waiting room from getting infected or exposed. Ask your healthcare provider to call the local or state health department. Persons who are placed under active monitoring or facilitated self-monitoring should follow instructions provided by their local health department or occupational health professionals, as appropriate. When working with your local health department check their available hours.   If you have a medical emergency and need to call 911, notify the dispatch personnel that you have, or are being evaluated for COVID-19. If possible, put on a facemask before emergency medical services arrive. Discontinuing home isolation  Patients with confirmed COVID-19 should remain under home isolation precautions until the risk of secondary transmission to others is thought to be low. The decision to discontinue home isolation precautions should be made on a case-by-case basis, in consultation with healthcare providers and state and local health departments.

## 2021-05-11 ENCOUNTER — TELEPHONE (OUTPATIENT)
Dept: FAMILY MEDICINE CLINIC | Age: 31
End: 2021-05-11

## 2021-05-11 LAB
ALBUMIN SERPL-MCNC: 3.7 G/DL (ref 3.5–5.2)
ALBUMIN/GLOBULIN RATIO: 1.1 (ref 1–2.5)
ALP BLD-CCNC: 77 U/L (ref 35–104)
ALT SERPL-CCNC: 12 U/L (ref 5–33)
ANION GAP SERPL CALCULATED.3IONS-SCNC: 14 MMOL/L (ref 9–17)
AST SERPL-CCNC: 11 U/L
BILIRUB SERPL-MCNC: 0.4 MG/DL (ref 0.3–1.2)
BUN BLDV-MCNC: 7 MG/DL (ref 6–20)
BUN/CREAT BLD: ABNORMAL (ref 9–20)
CALCIUM SERPL-MCNC: 9.8 MG/DL (ref 8.6–10.4)
CHLORIDE BLD-SCNC: 102 MMOL/L (ref 98–107)
CO2: 21 MMOL/L (ref 20–31)
CREAT SERPL-MCNC: 0.58 MG/DL (ref 0.5–0.9)
CREATININE URINE: 106.1 MG/DL (ref 28–217)
GFR AFRICAN AMERICAN: >60 ML/MIN
GFR NON-AFRICAN AMERICAN: >60 ML/MIN
GFR SERPL CREATININE-BSD FRML MDRD: ABNORMAL ML/MIN/{1.73_M2}
GFR SERPL CREATININE-BSD FRML MDRD: ABNORMAL ML/MIN/{1.73_M2}
GLUCOSE BLD-MCNC: 83 MG/DL (ref 70–99)
POTASSIUM SERPL-SCNC: 3.6 MMOL/L (ref 3.7–5.3)
SODIUM BLD-SCNC: 137 MMOL/L (ref 135–144)
THYROXINE, FREE: 1.06 NG/DL (ref 0.93–1.7)
TOTAL PROTEIN, URINE: 14 MG/DL
TOTAL PROTEIN: 7.2 G/DL (ref 6.4–8.3)
TSH SERPL DL<=0.05 MIU/L-ACNC: 8.85 MIU/L (ref 0.3–5)
URINE TOTAL PROTEIN CREATININE RATIO: 0.13 (ref 0–0.2)

## 2021-05-11 PROCEDURE — 84439 ASSAY OF FREE THYROXINE: CPT

## 2021-05-11 PROCEDURE — 82570 ASSAY OF URINE CREATININE: CPT

## 2021-05-11 PROCEDURE — 80053 COMPREHEN METABOLIC PANEL: CPT

## 2021-05-11 PROCEDURE — 84156 ASSAY OF PROTEIN URINE: CPT

## 2021-05-11 PROCEDURE — 84443 ASSAY THYROID STIM HORMONE: CPT

## 2021-05-11 RX ORDER — ASPIRIN 81 MG/1
81 TABLET ORAL DAILY
Qty: 90 TABLET | Refills: 1 | Status: ON HOLD | OUTPATIENT
Start: 2021-05-11 | End: 2021-10-11 | Stop reason: HOSPADM

## 2021-05-11 RX ORDER — LANOLIN ALCOHOL/MO/W.PET/CERES
50 CREAM (GRAM) TOPICAL DAILY
Qty: 30 TABLET | Refills: 3 | Status: SHIPPED | OUTPATIENT
Start: 2021-05-11 | End: 2021-07-27

## 2021-05-11 RX ORDER — ACETAMINOPHEN 500 MG
1000 TABLET ORAL EVERY 6 HOURS PRN
Qty: 60 TABLET | Refills: 2 | Status: ON HOLD | OUTPATIENT
Start: 2021-05-11 | End: 2021-11-23 | Stop reason: HOSPADM

## 2021-05-11 RX ORDER — CYCLOBENZAPRINE HCL 10 MG
10 TABLET ORAL NIGHTLY PRN
Qty: 10 TABLET | Refills: 0 | Status: SHIPPED | OUTPATIENT
Start: 2021-05-11 | End: 2021-05-21

## 2021-05-11 ASSESSMENT — ENCOUNTER SYMPTOMS
ABDOMINAL PAIN: 0
SINUS PAIN: 0
VOMITING: 0
SHORTNESS OF BREATH: 0
RHINORRHEA: 0
COUGH: 0

## 2021-05-11 NOTE — ED PROVIDER NOTES
[facility] ED  Emergency Department Encounter  Emergency Medicine Resident     Pt Name: Gaston Parsons  MRN: [id number]  [place] 1990  Date of evaluation: 5/10/21  PCP:  Lázaro Mock MD    [address] [place] [address]       Chief Complaint   Patient presents with    Abdominal Pain       HISTORY OFPRESENT ILLNESS  (Location/Symptom, Timing/Onset, Context/Setting, Quality, Duration, Modifying Factors,Severity.)      Gaston Parsons is a 27 y. o.yo female who is G5,P3 at [id number] gestation who presents with intermittent contractions since yesterday. Patient states that contraction will last about 5 minutes when they occur. States that patient has been in the emergency room, she has had 4 contractions. Denies any vaginal bleed however she states she has brown vaginal discharge. Denies any vaginal leakage of fluid. Denies any urinary hematuria or dysuria. Patient states that she has baseline headache, blurry vision, shortness of breath, chest pain, generalized body fatigue due to her history of POTS but nothing new. Of note, patient was recently at the hospital for complaints of bleed. She is Rh+. Urinalysis showed UTI and as patient was started on Keflex. OB was consulted but they had no further recommendation for patient to follow-up. Patient was also at her prenatal visit earlier today for complaint, pelvic exam was done, and thus was sent here for further management.     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 has a past surgical history that includes  section (2010); Nerve Block (Bilateral, 2018); Nerve Block (2019); epidural steroid injection (Left, 2019); Spine surgery; US SOFT TISSUE ABSCESS DRAINAGE PERC (10/26/2020); and back surgery (2020).      Social History     Socioeconomic History    Marital status:      Spouse name: Not on file    Number of children: Not on file    Years of education: Not on file    Highest education level: Not on file   Occupational History    Not on file   Social Needs    Financial resource strain: Not on file    Food insecurity     Worry: Not on file     Inability: Not on file    Transportation needs     Medical: Not on file     Non-medical: Not on file   Tobacco Use    Smoking status: Never Smoker    Smokeless tobacco: Never Used   Substance and Sexual Activity    Alcohol use: No    Drug use: No    Sexual activity: Not Currently     Partners: Male   Lifestyle    Physical activity     Days per week: Not on file     Minutes per session: Not on file    Stress: Not on file   Relationships    Social connections     Talks on phone: Not on file     Gets together: Not on file     Attends Confucianism service: Not on file     Active member of club or organization: Not on file     Attends meetings of clubs or organizations: Not on file     Relationship status: Not on file    Intimate partner violence     Fear of current or ex partner: Not on file     Emotionally abused: Not on file     Physically abused: Not on file     Forced sexual activity: Not on file   Other Topics Concern    Not on file   Social History Narrative    Not on file       Family History   Problem Relation Age of Onset    Diabetes Paternal Grandmother     Heart Failure Maternal Grandmother     Heart Attack Maternal Grandfather     Diabetes Father         Allergies:  Latex, Other, Nut [peanut-containing drug products], and Peanut (diagnostic)    Home Medications:  Prior to Admission medications    Medication Sig Start Date End Date Taking? Authorizing Provider   vitamin B-6 (PYRIDOXINE) 50 MG tablet Take 1 tablet by mouth daily 5/11/21  Yes Seda Louise   doxyLAMINE succinate (GNP SLEEP AID) 25 MG tablet Take 1 tablet by mouth nightly 5/11/21  Yes Seda PHILLIPS Louise   aspirin EC 81 MG EC tablet Take 1 tablet by mouth daily 5/11/21  Yes Seda PHILLIPS Louise   acetaminophen (APAP EXTRA STRENGTH) 500 MG tablet Take 2 tablets by mouth every 6 hours as needed for Pain 5/11/21  Yes Seda PHILLIPS Louise   cyclobenzaprine (FLEXERIL) 10 MG tablet Take 1 tablet by mouth nightly as needed for Muscle spasms 5/11/21 5/21/21 Yes Seda Olivarne   cephALEXin (KEFLEX) 500 MG capsule Take 1 capsule by mouth 3 times daily for 7 days 5/8/21 5/15/21  Tessa Patterson DO   levothyroxine (SYNTHROID) 100 MCG tablet Take 1 tablet by mouth daily 5/3/21 6/2/21  Corrine Dupree APRN - CNP   Prenatal Vit-Fe Fumarate-FA (PRENATAL VITAMINS PLUS PO) Take by mouth    Historical Provider, MD   levothyroxine (SYNTHROID) 75 MCG tablet Take 1 tablet by mouth daily 2/24/21   Wen Goodman MD   albuterol sulfate  (90 Base) MCG/ACT inhaler Inhale 2 puffs into the lungs every 6 hours as needed for Wheezing or Shortness of Breath 12/18/19   Wen Goodman MD   Respiratory Therapy Supplies (BREATHERITE VALVED MDI CHAMBER) RICHARD To be used with inhaler 1/13/17   Kierra Shukla MD       REVIEW OFSYSTEMS    (2-9 systems for level 4, 10 or more for level 5)      Review of Systems   Constitutional: Negative for chills and diaphoresis. HENT: Negative for rhinorrhea and sinus pain. Respiratory: Negative for cough and shortness of breath. Cardiovascular: Negative for chest pain and leg swelling. Gastrointestinal: Negative for abdominal pain and vomiting. Genitourinary: Positive for vaginal bleeding and vaginal discharge. Negative for dysuria, frequency and urgency. Musculoskeletal: Negative for neck pain and neck stiffness. Skin: Negative for rash and wound. Psychiatric/Behavioral: Negative for behavioral problems and confusion. PHYSICAL EXAM   (up to 7 for level 4, 8 or more forlevel 5)      INITIAL VITALS:   ED Triage Vitals   BP Temp Temp src Pulse Resp SpO2 Height Weight   05/10/21 2225 05/10/21 2224 -- 05/10/21 2225 05/10/21 2225 05/10/21 2225 -- --   (!) 134/94 98.1 °F (36.7 °C)  98 17 100 %         Physical Exam  Constitutional:       General: She is not in acute distress. Appearance: She is well-developed. She is obese. She is not ill-appearing. HENT:      Head: Normocephalic and atraumatic. Nose: Nose normal.      Mouth/Throat:      Mouth: Mucous membranes are moist.   Eyes:      Extraocular Movements: Extraocular movements intact. Pupils: Pupils are equal, round, and reactive to light. Neck:      Musculoskeletal: No neck rigidity or muscular tenderness. Cardiovascular:      Rate and Rhythm: Normal rate and regular rhythm. Pulmonary:      Effort: Pulmonary effort is normal. No respiratory distress. Abdominal:      General: There is no distension. Palpations: Abdomen is soft. Tenderness: There is no abdominal tenderness. There is no right CVA tenderness, left CVA tenderness, guarding or rebound. Musculoskeletal:         General: No swelling or tenderness. Skin:     General: Skin is warm. Capillary Refill: Capillary refill takes less than 2 seconds. Coloration: Skin is not cyanotic, jaundiced or mottled. Neurological:      General: No focal deficit present. Mental Status: She is alert and oriented to person, place, and time.    Psychiatric:         Mood and Affect: Mood normal.         Behavior: Behavior normal.         DIFFERENTIAL  DIAGNOSIS     PLAN (LABS / IMAGING / EKG):  Orders Placed This Encounter   Procedures    TSH with Reflex    COMPREHENSIVE METABOLIC PANEL    Protein / creatinine ratio, tablet, Refills: 2      cyclobenzaprine (FLEXERIL) 10 MG tablet Take 1 tablet by mouth nightly as needed for Muscle spasms  Qty: 10 tablet, Refills: 0             Jodi Phlilip MD  Emergency Medicine Resident    (Please note that portions of this note were completed with a voice recognition program.Efforts were made to edit the dictations but occasionally words are mis-transcribed.)        Jodi Phillip MD  Resident  05/11/21 9215

## 2021-05-11 NOTE — ED NOTES
Pt resting in bed with even non labored breaths. Pt A&Ox4. Pt denies any complaints. Pt shows no signs of distress. Will continue to monitor.         Kristal Griggs RN  05/11/21 0150
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
## 2021-05-11 NOTE — TELEPHONE ENCOUNTER
[facility] Initial Follow Up Call    Outreach made within 2 business days of discharge:yes  Patient: Sathya Nur Patient : 1990   MRN: [id number]  Reason for Admission: There are no discharge diagnoses documented for the most recent discharge.   Discharge Date: 21       Spoke with: adonis    Discharge department/facility:[facility] Interactive Patient Contact: yes  Was patient able to fill all prescriptions:yes    Was patient instructed to bring all medications to the follow-up visit:yes  Is patient taking all medications as directed in the discharge summary yes  Does patient understand their discharge instructions: yes    Does patient have questions or concerns that need addressed prior to 7-14 day follow up office visit:no  Scheduled appointment with PCP within 7-14 days    Follow Up 2021  Future Appointments   Date Time Provider Westley Smith   2021 12:00 PM MD huseyin Jansen sc JENNIFER   2021  9:15 AM [address] [place] [address]   2021  9:00 AM ULTRASONOGRAPHER 1 Mat Fetal JENNIFER [place], [place] na

## 2021-05-11 NOTE — ED PROVIDER NOTES
[facility] [facility]     Emergency Department     Faculty Attestation        I performed a history and physical examination of the patient and discussed management with the resident. I reviewed the residents note and agree with the documented findings and plan of care. Any areas of disagreement are noted on the chart. I was personally present for the key portions of any procedures. I have documented in the chart those procedures where I was not present during the key portions. I have reviewed the emergency nurses triage note. I agree with the chief complaint, past medical history, past surgical history, allergies, medications, social and family history as documented unless otherwise noted below. For mid-level providers such as nurse practitioners as well as physicians assistants:    I have personally seen and evaluated the patient. I find the patient's history and physical exam are consistent with NP/PA documentation. I agree with the care provided, treatment rendered, disposition, & follow-up plan. Additional findings are as noted. Vital Signs: BP (!) 134/94   Pulse 98   Temp 98.1 °F (36.7 °C)   Resp 17   LMP 01/31/2021   SpO2 100%   PCP:  José Tapia MD    Pertinent Comments:     Presents to the emergency department for evaluation of pelvic pain she was seen here 3 days ago had a extensive work-up her labs were negative she had a pelvic exam with negative swabs she had a urinalysis which had bacteria, but her culture has not grown anything. She states she had some vaginal bleeding on her first visit here with assistance trailed off. She is Rh+. She denies any fevers, chills, nausea vomiting or diarrhea. On exam she has very mild suprapubic abdominal tenderness there is no right lower quadrant tenderness, pain McBurney's point, negative Rovsing sign. She was seen's office yesterday and checked and had normal fetal heart tones.   Will check bedside ultrasound and pelvic exam      Critical Care  None          Lillian Saldivar MD    Attending Emergency Medicine Physician              Tess Lee MD  05/10/21 [id number]

## 2021-05-11 NOTE — ED NOTES
Pt to Ed from home with c/o \"contractions\" since yesterday. Pt states she is 14 weeks and 3 days pregnant, and that the contractions are \"smaller\" than with her previous 3 births.  5, para 3. Pt alert and oriented x4, NAD. States she was having heavy bleeding Friday that has since lessened. Pt reports going to the OBGYN yesterday who checked fetal heart tones and said they were strong with no concerns. Pt denies complications with previous pregnancies.       Albino Srinivasan RN  05/10/21 [id number]

## 2021-05-12 ENCOUNTER — CARE COORDINATION (OUTPATIENT)
Dept: CARE COORDINATION | Age: 31
End: 2021-05-12

## 2021-05-12 NOTE — CARE COORDINATION
Educated patient about risk for severe COVID-19 due to risk factors according to CDC guidelines. ACM reviewed discharge instructions, medical action plan and red flag symptoms patient who verbalized understanding. Discussed COVID vaccination status Yes. Education provided on COVID-19 vaccination as appropriate. Discussed exposure protocols and quarantine with CDC Guidelines. Patient was given an opportunity to verbalize any questions and concerns and agrees to contact ACM or health care provider for questions related to their healthcare. Advance Care Planning  People with COVID-19 may have no symptoms, mild symptoms, such as fever, cough, and shortness of breath or they may have more severe illness, developing severe and fatal pneumonia. As a result, Advance Care Planning with attention to naming a health care decision maker (someone you trust to make healthcare decisions for you if you could not speak for yourself) and sharing other health care preferences is important BEFORE a possible health crisis. Please contact your Primary Care Provider to discuss Advance Care Planning. Preventing the Spread of Coronavirus Disease 2019 in Homes and Residential Communities  For the most recent information go to [email or web address]    Prevention steps for People with confirmed or suspected COVID-19 (including persons under investigation) who do not need to be hospitalized  and   People with confirmed COVID-19 who were hospitalized and determined to be medically stable to go home    Your healthcare provider and public health staff will evaluate whether you can be cared for at home. If it is determined that you do not need to be hospitalized and can be isolated at home, you will be monitored by staff from your local or state health department.  You should follow the prevention steps below until a healthcare provider or local or state health department says you can return to your normal activities. Stay home except to get medical care  People who are mildly ill with COVID-19 are able to isolate at home during their illness. You should restrict activities outside your home, except for getting medical care. Do not go to work, school, or public areas. Avoid using public transportation, ride-sharing, or taxis. Separate yourself from other people and animals in your home  People: As much as possible, you should stay in a specific room and away from other people in your home. Also, you should use a separate bathroom, if available. Animals: You should restrict contact with pets and other animals while you are sick with COVID-19, just like you would around other people. Although there have not been reports of pets or other animals becoming sick with COVID-19, it is still recommended that people sick with COVID-19 limit contact with animals until more information is known about the virus. When possible, have another member of your household care for your animals while you are sick. If you are sick with COVID-19, avoid contact with your pet, including petting, snuggling, being kissed or licked, and sharing food. If you must care for your pet or be around animals while you are sick, wash your hands before and after you interact with pets and wear a facemask. Call ahead before visiting your doctor  If you have a medical appointment, call the healthcare provider and tell them that you have or may have COVID-19. This will help the healthcare providers office take steps to keep other people from getting infected or exposed. Wear a facemask  You should wear a facemask when you are around other people (e.g., sharing a room or vehicle) or pets and before you enter a healthcare providers office.  If you are not able to wear a facemask (for example, because it causes trouble breathing), then people who live with you should not stay in the same room with you, or they should wear a facemask if they enter your room. Cover your coughs and sneezes  Cover your mouth and nose with a tissue when you cough or sneeze. Throw used tissues in a lined trash can. Immediately wash your hands with soap and water for at least 20 seconds or, if soap and water are not available, clean your hands with an alcohol-based hand  that contains at least 60% alcohol. Clean your hands often  Wash your hands often with soap and water for at least 20 seconds, especially after blowing your nose, coughing, or sneezing; going to the bathroom; and before eating or preparing food. If soap and water are not readily available, use an alcohol-based hand  with at least 60% alcohol, covering all surfaces of your hands and rubbing them together until they feel dry. Soap and water are the best option if hands are visibly dirty. Avoid touching your eyes, nose, and mouth with unwashed hands. Avoid sharing personal household items  You should not share dishes, drinking glasses, cups, eating utensils, towels, or bedding with other people or pets in your home. After using these items, they should be washed thoroughly with soap and water. Clean all high-touch surfaces everyday  High touch surfaces include counters, tabletops, doorknobs, bathroom fixtures, toilets, phones, keyboards, tablets, and bedside tables. Also, clean any surfaces that may have blood, stool, or body fluids on them. Use a household cleaning spray or wipe, according to the label instructions. Labels contain instructions for safe and effective use of the cleaning product including precautions you should take when applying the product, such as wearing gloves and making sure you have good ventilation during use of the product. Monitor your symptoms  Seek prompt medical attention if your illness is worsening (e.g., difficulty breathing). Before seeking care, call your healthcare provider and tell them that you have, or are being evaluated for, COVID-19.  Put on a facemask before you enter the facility. These steps will help the healthcare providers office to keep other people in the office or waiting room from getting infected or exposed. Ask your healthcare provider to call the local or state health department. Persons who are placed under active monitoring or facilitated self-monitoring should follow instructions provided by their local health department or occupational health professionals, as appropriate. When working with your local health department check their available hours. If you have a medical emergency and need to call 911, notify the dispatch personnel that you have, or are being evaluated for COVID-19. If possible, put on a facemask before emergency medical services arrive. Discontinuing home isolation  Patients with confirmed COVID-19 should remain under home isolation precautions until the risk of secondary transmission to others is thought to be low. The decision to discontinue home isolation precautions should be made on a case-by-case basis, in consultation with healthcare providers and state and local health departments.

## 2021-05-12 NOTE — CARE COORDINATION
ACM discussed the benefits and services of care coordination program.  After discussion, patient declined service at this time.   Requested ACM call back n\om Monday, 17 May after 1:00 PM.

## 2021-05-17 ENCOUNTER — OFFICE VISIT (OUTPATIENT)
Dept: FAMILY MEDICINE CLINIC | Age: 31
End: 2021-05-17
Payer: MEDICARE

## 2021-05-17 VITALS
HEIGHT: 65 IN | BODY MASS INDEX: 38.15 KG/M2 | DIASTOLIC BLOOD PRESSURE: 70 MMHG | TEMPERATURE: 97 F | SYSTOLIC BLOOD PRESSURE: 110 MMHG | OXYGEN SATURATION: 98 % | WEIGHT: 229 LBS | HEART RATE: 98 BPM

## 2021-05-17 DIAGNOSIS — O46.92 VAGINAL BLEEDING IN PREGNANCY, SECOND TRIMESTER: ICD-10-CM

## 2021-05-17 DIAGNOSIS — E03.9 HYPOTHYROIDISM, UNSPECIFIED TYPE: ICD-10-CM

## 2021-05-17 DIAGNOSIS — N93.9 VAGINAL BLEEDING: Primary | ICD-10-CM

## 2021-05-17 PROCEDURE — 99213 OFFICE O/P EST LOW 20 MIN: CPT | Performed by: FAMILY MEDICINE

## 2021-05-17 PROCEDURE — G8417 CALC BMI ABV UP PARAM F/U: HCPCS | Performed by: FAMILY MEDICINE

## 2021-05-17 PROCEDURE — 1036F TOBACCO NON-USER: CPT | Performed by: FAMILY MEDICINE

## 2021-05-17 PROCEDURE — G8427 DOCREV CUR MEDS BY ELIG CLIN: HCPCS | Performed by: FAMILY MEDICINE

## 2021-05-17 SDOH — ECONOMIC STABILITY: TRANSPORTATION INSECURITY
IN THE PAST 12 MONTHS, HAS THE LACK OF TRANSPORTATION KEPT YOU FROM MEDICAL APPOINTMENTS OR FROM GETTING MEDICATIONS?: NO

## 2021-05-17 SDOH — ECONOMIC STABILITY: FOOD INSECURITY: WITHIN THE PAST 12 MONTHS, YOU WORRIED THAT YOUR FOOD WOULD RUN OUT BEFORE YOU GOT MONEY TO BUY MORE.: NEVER TRUE

## 2021-05-17 ASSESSMENT — ENCOUNTER SYMPTOMS
DIARRHEA: 0
ABDOMINAL DISTENTION: 0
VOMITING: 0
ABDOMINAL PAIN: 0
SHORTNESS OF BREATH: 0
BLOOD IN STOOL: 0

## 2021-05-17 ASSESSMENT — PATIENT HEALTH QUESTIONNAIRE - PHQ9
SUM OF ALL RESPONSES TO PHQ QUESTIONS 1-9: 0
SUM OF ALL RESPONSES TO PHQ QUESTIONS 1-9: 0
2. FEELING DOWN, DEPRESSED OR HOPELESS: 0
1. LITTLE INTEREST OR PLEASURE IN DOING THINGS: 0

## 2021-05-17 ASSESSMENT — SOCIAL DETERMINANTS OF HEALTH (SDOH): HOW HARD IS IT FOR YOU TO PAY FOR THE VERY BASICS LIKE FOOD, HOUSING, MEDICAL CARE, AND HEATING?: NOT HARD AT ALL

## 2021-05-17 NOTE — PROGRESS NOTES
Chief Complaint   Patient presents with    Follow-Up from [facility] Angelique Alex  here today for follow up on chronic medical problems, go over labs and/or diagnostic studies, and medication refills. Follow-Up from Hospital      HPI: Patient is here for ER follow-up, is in second trimester pregnancy, patient had vaginal bleeding twice. Patient reports last 8 months spotting and then it started like menstrual cycles. She went to ER had blood work done which was normal except TSH was high. Synthroid was increased. She also had ultrasound done which showed normal pregnancy. Patient was sent home with muscle relaxant and to follow-up with gynecologist.    Patient reports spotting has improved, but she still gets abdominal cramping and contractions. /70   Pulse 98   Temp 97 °F (36.1 °C)   Ht 5' 5\" (1.651 m)   Wt 229 lb (103.9 kg)   LMP 01/31/2021   SpO2 98%   BMI 38.11 kg/m²    Body mass index is 38.11 kg/m². Wt Readings from Last 3 Encounters:   05/17/21 229 lb (103.9 kg)   05/10/21 228 lb (103.4 kg)   05/07/21 230 lb (104.3 kg)        [x]Negative depression screening. PHQ Scores 5/17/2021 3/16/2021 4/30/2020 2/28/2019 10/31/2018 1/9/2018 12/27/2016   PHQ2 Score 0 0 0 0 0 0 0   PHQ9 Score 0 0 0 0 0 0 0      []1-4 = Minimal depression   []5-9 = Milddepression   []10-14 = Moderate depression   []15-19 = Moderately severe depression   []20-27 = Severe depression    Discussed testing with the patient and all questions fully answered.     Admission on 05/10/2021, Discharged on 05/11/2021   Component Date Value Ref Range Status    TSH 05/11/2021 8.85* 0.30 - 5.00 mIU/L Final    Glucose 05/11/2021 83  70 - 99 mg/dL Final    BUN 05/11/2021 7  6 - 20 mg/dL Final    CREATININE 05/11/2021 0.58  0.50 - 0.90 mg/dL Final    Bun/Cre Ratio 05/11/2021 NOT REPORTED  9 - 20 Final    Calcium 05/11/2021 9.8  8.6 - 10.4 mg/dL Final    Sodium 05/11/2021 137  135 - 144 mmol/L Final    Potassium 05/11/2021 3.6* 3.7 - 5.3 mmol/L Final    Chloride 05/11/2021 102  98 - 107 mmol/L Final    CO2 05/11/2021 21  20 - 31 mmol/L Final    Anion Gap 05/11/2021 14  9 - 17 mmol/L Final    Alkaline Phosphatase 05/11/2021 77  35 - 104 U/L Final    ALT 05/11/2021 12  5 - 33 U/L Final    AST 05/11/2021 11  <32 U/L Final    Total Bilirubin 05/11/2021 0.40  0.3 - 1.2 mg/dL Final    Total Protein 05/11/2021 7.2  6.4 - 8.3 g/dL Final    Albumin 05/11/2021 3.7  3.5 - 5.2 g/dL Final    Albumin/Globulin Ratio 05/11/2021 1.1  1.0 - 2.5 Final    GFR Non- 05/11/2021 >60  >60 mL/min Final    GFR  05/11/2021 >60  >60 mL/min Final    GFR Comment 05/11/2021        Final    Comment: Average GFR for 30-36 years old:   80 mL/min/1.73sq m  Chronic Kidney Disease:   <60 mL/min/1.73sq m  Kidney failure:   <15 mL/min/1.73sq m              eGFR calculated using average adult body mass. Additional eGFR calculator available at:        Kicknote.com.br            GFR Staging 05/11/2021 NOT REPORTED   Final    Total Protein, Urine 05/11/2021 14  mg/dL Final    No normal range established.     Creatinine, Ur 05/11/2021 106.1  28.0 - 217.0 mg/dL Final    Urine Total Protein Creatinine Rat* 05/11/2021 0.13  0.00 - 0.20 Final    Thyroxine, Free 05/11/2021 1.06  0.93 - 1.70 ng/dL Final         Most recent labs reviewed:     Lab Results   Component Value Date    WBC 9.5 05/07/2021    HGB 13.3 05/07/2021    HCT 43.2 05/07/2021    MCV 95.8 05/07/2021    PLT See Reflexed IPF Result 05/07/2021       @BRIEFLAB(NA,K,CL,CO2,BUN,CREATININE,GLUCOSE,CALCIUM)@     Lab Results   Component Value Date    ALT 12 05/11/2021    AST 11 05/11/2021    ALKPHOS 77 05/11/2021    BILITOT 0.40 05/11/2021       Lab Results   Component Value Date    TSH 8.85 (H) 05/11/2021       Lab Results   Component Value Date    CHOL 148 03/17/2021    CHOL 184 09/06/2019     Lab Results   Component Value Date TRIG 79 03/17/2021    TRIG 56 09/06/2019     Lab Results   Component Value Date    HDL 42 03/17/2021    HDL 42 09/06/2019     Lab Results   Component Value Date    LDLCHOLESTEROL 90 03/17/2021    LDLCHOLESTEROL 131 (H) 09/06/2019     Lab Results   Component Value Date    VLDL NOT REPORTED 03/17/2021    VLDL NOT REPORTED 09/06/2019     Lab Results   Component Value Date    CHOLHDLRATIO 3.5 03/17/2021    CHOLHDLRATIO 4.4 09/06/2019       Lab Results   Component Value Date    LABA1C 5.1 03/16/2021       Lab Results   Component Value Date    NXWFRWBC36 320 05/31/2017       Lab Results   Component Value Date    FOLATE 14.8 05/31/2017       No results found for: IRON, TIBC, FERRITIN    Lab Results   Component Value Date    VITD25 27.4 (L) 04/30/2020             Current Outpatient Medications   Medication Sig Dispense Refill    vitamin B-6 (PYRIDOXINE) 50 MG tablet Take 1 tablet by mouth daily 30 tablet 3    doxyLAMINE succinate (GNP SLEEP AID) 25 MG tablet Take 1 tablet by mouth nightly 30 tablet 2    aspirin EC 81 MG EC tablet Take 1 tablet by mouth daily 90 tablet 1    acetaminophen (APAP EXTRA STRENGTH) 500 MG tablet Take 2 tablets by mouth every 6 hours as needed for Pain 60 tablet 2    cyclobenzaprine (FLEXERIL) 10 MG tablet Take 1 tablet by mouth nightly as needed for Muscle spasms 10 tablet 0    levothyroxine (SYNTHROID) 100 MCG tablet Take 1 tablet by mouth daily 30 tablet 1    Prenatal Vit-Fe Fumarate-FA (PRENATAL VITAMINS PLUS PO) Take by mouth      albuterol sulfate  (90 Base) MCG/ACT inhaler Inhale 2 puffs into the lungs every 6 hours as needed for Wheezing or Shortness of Breath 1 Inhaler 1    Respiratory Therapy Supplies (BREATHERITE VALVED MDI CHAMBER) RICHARD To be used with inhaler 1 Device 1     No current facility-administered medications for this visit.              Social History     Socioeconomic History    Marital status:      Spouse name: Not on file    Number of children: Not on file    Years of education: Not on file    Highest education level: Not on file   Occupational History    Not on file   Tobacco Use    Smoking status: Never Smoker    Smokeless tobacco: Never Used   Vaping Use    Vaping Use: Never used   Substance and Sexual Activity    Alcohol use: No    Drug use: No    Sexual activity: Not Currently     Partners: Male   Other Topics Concern    Not on file   Social History Narrative    Not on file     Social Determinants of Health     Financial Resource Strain: Low Risk     Difficulty of Paying Living Expenses: Not hard at all   Food Insecurity: No Food Insecurity    Worried About [address] in the Last Year: Never true    Ti of Food in the Last Year: Never true   Transportation Needs: No Transportation Needs    Lack of Transportation (Medical): No    Lack of Transportation (Non-Medical): No   Physical Activity:     Days of Exercise per Week:     Minutes of Exercise per Session:    Stress:     Feeling of Stress :    Social Connections:     Frequency of Communication with Friends and Family:     Frequency of Social Gatherings with Friends and Family:     Attends Sikh Services:     Active Member of Clubs or Organizations:     Attends Club or Organization Meetings:     Marital Status:    Intimate Partner Violence:     Fear of Current or Ex-Partner:     Emotionally Abused:     Physically Abused:     Sexually Abused:      Counseling given: Not Answered        Family History   Problem Relation Age of Onset    Diabetes Paternal Grandmother     Heart Failure Maternal Grandmother     Heart Attack Maternal Grandfather     Diabetes Father              -rest of complaints with corresponding details per ROS    The patient's past medical, surgical, social, and family history as well as her current medications and allergies were reviewed as documented intoday's encounter.         Review of Systems   Constitutional: Negative for activity change, diaphoresis, fatigue and unexpected weight change. Eyes: Negative for visual disturbance. Respiratory: Negative for shortness of breath. Cardiovascular: Negative for chest pain, palpitations and leg swelling. Gastrointestinal: Negative for abdominal distention, abdominal pain, blood in stool, diarrhea and vomiting. Endocrine: Negative for polyuria. Genitourinary: Positive for pelvic pain and vaginal bleeding. Negative for flank pain, frequency and urgency. Musculoskeletal: Negative for arthralgias, joint swelling and myalgias. Neurological: Negative for dizziness, speech difficulty and headaches. Psychiatric/Behavioral: Negative for agitation, decreased concentration and sleep disturbance. The patient is nervous/anxious. Physical Exam  Vitals and nursing note reviewed. Constitutional:       Appearance: Normal appearance. HENT:      Mouth/Throat:      Mouth: Mucous membranes are moist.   Cardiovascular:      Rate and Rhythm: Normal rate and regular rhythm. Heart sounds: Normal heart sounds. Pulmonary:      Effort: Pulmonary effort is normal. No respiratory distress. Breath sounds: Normal breath sounds. No wheezing or rhonchi. Abdominal:      General: Bowel sounds are normal.      Palpations: Abdomen is soft. Tenderness: There is no abdominal tenderness. Musculoskeletal:         General: Normal range of motion. Neurological:      General: No focal deficit present. Mental Status: She is alert and oriented to person, place, and time. Psychiatric:         Attention and Perception: Attention normal.         Mood and Affect: Mood is anxious. Speech: Speech normal. She is communicative. ASSESSMENT AND PLAN      1. Vaginal bleeding  Improving, take rest, drink plenty of fluids. 2. Vaginal bleeding in pregnancy, second trimester  Bleeding has stopped, patient was seen by OB.     3. Hypothyroidism, unspecified type  Synthroid was increased recheck TSH every 4 weeks as per OB. No orders of the defined types were placed in this encounter. Medications Discontinued During This Encounter   Medication Reason    levothyroxine (SYNTHROID) 75 MCG tablet DOSE ADJUSTMENT       Jazmin Navarrete received counseling on the following healthy behaviors: nutrition, exercise and medication adherence  Reviewed prior labs and health maintenance. Continue current medications, diet and exercise. Discussed use, benefit, and side effects of prescribed medications. Barriers to medication compliance addressed. Patient given educational materials - see patient instructions. All patient questions answered. Patient voiced understanding. The patient'spast medical, surgical, social, and family history as well as her   current medications and allergies were reviewed as documented in today's encounter. Medications, labs, diagnostic studies, consultations andfollow-up as documented in this encounter. No follow-ups on file. Patient wasseen with total face to face time of 20 minutes. More than 50% of this visit was counseling and education. Future Appointments   Date Time Provider Westley Smith   6/1/2021  9:15 AM DEAN Bynum - CNP [facility]   6/23/2021  9:00 AM ULTRASONOGRAPHER [facility]     This note was completed by using the assistance of a speech-recognition program. However, inadvertent computerized transcription errors may be present. Althoughevery effort was made to ensure accuracy, no guarantees can be provided that every mistake has been identified and corrected by editing.   Electronically signed by Patricia Zarco MD on 5/17/2021  1:40 PM

## 2021-05-17 NOTE — PROGRESS NOTES
Visit Information    Have you changed or started any medications since your last visit including any over-the-counter medicines, vitamins, or herbal medicines? no   Are you having any side effects from any of your medications? -  no  Have you stopped taking any of your medications? Is so, why? -  no    Have you seen any other physician or provider since your last visit? No  Have you had any other diagnostic tests since your last visit? Yes - Records Obtained  Have you been seen in the emergency room and/or had an admission to a hospital since we last saw you? Yes - Records Obtained  Have you had your routine dental cleaning in the past 6 months? no    Have you activated your [email or web address] account? If not, what are your barriers?  Yes     Patient Care Team:  Lázaro Mock MD as PCP - General (Family Medicine)  Lázaro Mock MD as PCP - [facility]    Medical History Review  Past Medical, Family, and Social History reviewed and does contribute to the patient presenting condition    Health Maintenance   Topic Date Due    Varicella vaccine (1 of 2 - 2-dose childhood series) Never done    Pneumococcal 0-64 years Vaccine (1 of 3 - PCV13) Never done    COVID-19 Vaccine (1) Never done    Flu vaccine (Season Ended) 09/01/2021    A1C test (Diabetic or Prediabetic)  03/16/2022    TSH testing  05/11/2022    Cervical cancer screen  05/03/2026    DTaP/Tdap/Td vaccine (2 - Td) 12/27/2026    Hepatitis C screen  Completed    HIV screen  Completed    Hepatitis A vaccine  Aged Out    Hepatitis B vaccine  Aged Out    Hib vaccine  Aged Out    Meningococcal (ACWY) vaccine  Aged Out

## 2021-06-01 ENCOUNTER — PROCEDURE VISIT (OUTPATIENT)
Dept: OBGYN CLINIC | Age: 31
End: 2021-06-01
Payer: MEDICARE

## 2021-06-01 ENCOUNTER — TELEPHONE (OUTPATIENT)
Dept: OBGYN CLINIC | Age: 31
End: 2021-06-01

## 2021-06-01 ENCOUNTER — ROUTINE PRENATAL (OUTPATIENT)
Dept: OBGYN CLINIC | Age: 31
End: 2021-06-01
Payer: MEDICARE

## 2021-06-01 ENCOUNTER — HOSPITAL ENCOUNTER (OUTPATIENT)
Age: 31
Setting detail: SPECIMEN
Discharge: HOME | End: 2021-06-01
Payer: MEDICARE

## 2021-06-01 VITALS
BODY MASS INDEX: 38.61 KG/M2 | HEART RATE: 93 BPM | SYSTOLIC BLOOD PRESSURE: 124 MMHG | WEIGHT: 232 LBS | DIASTOLIC BLOOD PRESSURE: 78 MMHG

## 2021-06-01 DIAGNOSIS — Z3A.17 17 WEEKS GESTATION OF PREGNANCY: ICD-10-CM

## 2021-06-01 DIAGNOSIS — Z86.69 HISTORY OF CHIARI MALFORMATION: ICD-10-CM

## 2021-06-01 DIAGNOSIS — O34.219 VBAC (VAGINAL BIRTH AFTER CESAREAN): ICD-10-CM

## 2021-06-01 DIAGNOSIS — G90.A POTS (POSTURAL ORTHOSTATIC TACHYCARDIA SYNDROME): ICD-10-CM

## 2021-06-01 DIAGNOSIS — Z98.891 H/O: C-SECTION: ICD-10-CM

## 2021-06-01 DIAGNOSIS — Z82.79 FAMILY HISTORY OF CONGENITAL HEART DEFECT: ICD-10-CM

## 2021-06-01 DIAGNOSIS — Z82.79 FAMILY HISTORY OF DOWN SYNDROME: ICD-10-CM

## 2021-06-01 DIAGNOSIS — R73.09 ABNORMAL GLUCOSE TOLERANCE TEST (GTT): ICD-10-CM

## 2021-06-01 DIAGNOSIS — G96.191 TARLOV CYSTS: Primary | ICD-10-CM

## 2021-06-01 DIAGNOSIS — Z83.3 FAMILY HISTORY OF DIABETES MELLITUS IN FATHER: ICD-10-CM

## 2021-06-01 DIAGNOSIS — Z98.890 HISTORY OF BACK SURGERY: ICD-10-CM

## 2021-06-01 DIAGNOSIS — E03.9 HYPOTHYROIDISM, UNSPECIFIED TYPE: ICD-10-CM

## 2021-06-01 PROBLEM — O44.40 LOW LYING PLACENTA, ANTEPARTUM: Status: ACTIVE | Noted: 2021-06-01

## 2021-06-01 LAB
ABDOMINAL CIRCUMFERENCE: NORMAL
BIPARIETAL DIAMETER: NORMAL
DIRECT EXAM: NORMAL
ESTIMATED FETAL WEIGHT: NORMAL
FEMORAL DIAMETER: NORMAL
FEMORAL LENGTH: NORMAL
HC/AC: NORMAL
HEAD CIRCUMFERENCE: NORMAL
Lab: NORMAL
SPECIMEN DESCRIPTION: NORMAL

## 2021-06-01 PROCEDURE — G8427 DOCREV CUR MEDS BY ELIG CLIN: HCPCS | Performed by: NURSE PRACTITIONER

## 2021-06-01 PROCEDURE — 99213 OFFICE O/P EST LOW 20 MIN: CPT | Performed by: NURSE PRACTITIONER

## 2021-06-01 PROCEDURE — 87480 CANDIDA DNA DIR PROBE: CPT

## 2021-06-01 PROCEDURE — G8417 CALC BMI ABV UP PARAM F/U: HCPCS | Performed by: NURSE PRACTITIONER

## 2021-06-01 PROCEDURE — 87660 TRICHOMONAS VAGIN DIR PROBE: CPT

## 2021-06-01 PROCEDURE — 76815 OB US LIMITED FETUS(S): CPT | Performed by: OBSTETRICS & GYNECOLOGY

## 2021-06-01 PROCEDURE — 87591 N.GONORRHOEAE DNA AMP PROB: CPT

## 2021-06-01 PROCEDURE — 1036F TOBACCO NON-USER: CPT | Performed by: NURSE PRACTITIONER

## 2021-06-01 PROCEDURE — 87491 CHLMYD TRACH DNA AMP PROBE: CPT

## 2021-06-01 PROCEDURE — 87510 GARDNER VAG DNA DIR PROBE: CPT

## 2021-06-01 NOTE — TELEPHONE ENCOUNTER
Per CNP, patient advised of low lying placenta at 17 weeks gestation. Patient instructed to initiate pelvic rest and to ensure no lifting over 10-15lbs. Patient aware to call office with any questions/concerns. Patient scheduled for anatomy scan with MFM, patient aware that placental location will be reassessed at that ultrasound. Aware to maintain restrictions until told otherwise by obstetrical care provider.

## 2021-06-01 NOTE — TELEPHONE ENCOUNTER
----- Message from DEAN Mayorga CNP sent at 6/1/2021 12:08 PM EDT -----  IUP at 17 weeks. Low lying placenta noted. Please instruct patient- pelvic rest and no heavy lifting.

## 2021-06-01 NOTE — PROGRESS NOTES
Larraine Spurling is a 27 y.o. female 17w6d    [id number]    OB History    Para Term  AB Living   5 3 3   1 3   SAB TAB Ectopic Molar Multiple Live Births   1         3      # Outcome Date GA Lbr Abdifatah/2nd Weight Sex Delivery Anes PTL Lv   5 Current            4 Term 13 40w0d  6 lb 7 oz (2.92 kg) M    WESLEY   3 Term 11 38w0d  6 lb 14 oz (3.118 kg) F  EPI N WESLEY      Birth Comments: 2 nd degree tear   2 Term 04/11/10 41w0d  9 lb (4.082 kg) F CS-LTranv EPI N WESLEY      Birth Comments: Asynclitis   1 SAB                      Vitals  BP: 124/78  Weight: 232 lb (105.2 kg)  Pulse: 93  Patient Position: Sitting  Albumin: Negative  Glucose: Negative      The patient was seen and evaluated. There was Positive fetal movements. No contractions or leakage of fluid. Signs and symptoms of  labor as well as labor were reviewed. The Nuchal Translucency testing was reviewed and found to be not completed- declines. A single marker MSAFP was declined for a 15-20 week gestational age window. TOP ST OH Reviewed. Dates were reviewed with the patient. An 18-22 week anatomy ultrasound has been ordered. The patient will return to the office for her next visit in 4 weeks. See antepartum flow sheet. Patient complaining of occasional brown discharge. Has been in the ER twice for vaginal bleeding in last several weeks. Denies cramping or contractions today. Reports feeling baby move yesterday. 21 - Declined first trimester screening        Assessment:  1. Larraine Spurling is a 27 y.o. female  2.  [id number]  3. 17w6d    Patient Active Problem List    Diagnosis Date Noted    Tarlov cysts 2021     Priority: High    Abnormal glucose tolerance test (GTT) 2021     Priority: High     5/3/2021 3 hour GTT and Hgb A1c ordered      Family history of diabetes mellitus in father 2021     Priority: High     21 - Early 1 hour gtt ordered      History of Chiari malformation 2021 (antinuclear antibody) 2017    Chronic fatigue 2017    Seasonal allergic rhinitis due to pollen 2017    Reactive airway disease 2016    Allergic sinusitis 2016    Psoriasis 2016    Neck pain 2016        Diagnosis Orders   1. Tarlov cysts     2. Family history of diabetes mellitus in father     3. History of back surgery     4. History of Chiari malformation     5.  (vaginal birth after )     6. H/O:      7. Family history of Down syndrome     8. Family history of congenital heart defect     9. POTS (postural orthostatic tachycardia syndrome)     10. Hypothyroidism, unspecified type  TSH with Reflex    T4, Free   11. Abnormal glucose tolerance test (GTT)     12. 17 weeks gestation of pregnancy  VAGINITIS DNA PROBE    C.trachomatis N.gonorrhoeae DNA    Urinalysis Reflex to Culture    US OB 1 or More Fetus Limited - Clinic Performed    TSH with Reflex    T4, Free         Plan:  RTO in 4 weeks for OB visit. Lab slip given for UA, TSH, free T4. OB ultrasound in office today  Vaginal cultures collected. Call office or go to ER with increase in vaginal bleeding or cramping. Patient was seen with total face to face time of 20 minutes. More than 50% of this visit was on counseling and education regarding her    Diagnosis Orders   1. Tarlov cysts     2. Family history of diabetes mellitus in father     3. History of back surgery     4. History of Chiari malformation     5.  (vaginal birth after )     6. H/O:      7. Family history of Down syndrome     8. Family history of congenital heart defect     9. POTS (postural orthostatic tachycardia syndrome)     10. Hypothyroidism, unspecified type  TSH with Reflex    T4, Free   11.  Abnormal glucose tolerance test (GTT)     12. 17 weeks gestation of pregnancy  VAGINITIS DNA PROBE    C.trachomatis N.gonorrhoeae DNA    Urinalysis Reflex to Culture    US OB 1 or More Fetus Limited - Clinic Performed    TSH with Reflex    T4, Free    and her options. She was also counseled on her preventative health maintenance recommendations and follow-up.

## 2021-06-03 ENCOUNTER — CARE COORDINATION (OUTPATIENT)
Dept: CARE COORDINATION | Age: 31
End: 2021-06-03

## 2021-06-23 ENCOUNTER — ROUTINE PRENATAL (OUTPATIENT)
Dept: PERINATAL CARE | Age: 31
End: 2021-06-23
Payer: MEDICARE

## 2021-06-23 ENCOUNTER — HOSPITAL ENCOUNTER (OUTPATIENT)
Age: 31
Discharge: HOME | End: 2021-06-23
Payer: MEDICARE

## 2021-06-23 VITALS
DIASTOLIC BLOOD PRESSURE: 66 MMHG | SYSTOLIC BLOOD PRESSURE: 126 MMHG | RESPIRATION RATE: 20 BRPM | HEART RATE: 82 BPM | WEIGHT: 236 LBS | HEIGHT: 70 IN | TEMPERATURE: 97.2 F | BODY MASS INDEX: 33.79 KG/M2

## 2021-06-23 DIAGNOSIS — O99.282 HYPOTHYROIDISM AFFECTING PREGNANCY IN SECOND TRIMESTER: ICD-10-CM

## 2021-06-23 DIAGNOSIS — O99.891 CURRENT MATERNAL CONDITION AFFECTING PREGNANCY: ICD-10-CM

## 2021-06-23 DIAGNOSIS — O44.00 PLACENTA PREVIA WITHOUT HEMORRHAGE, ANTEPARTUM: ICD-10-CM

## 2021-06-23 DIAGNOSIS — E03.9 HYPOTHYROIDISM AFFECTING PREGNANCY IN SECOND TRIMESTER: ICD-10-CM

## 2021-06-23 DIAGNOSIS — O16.2 HYPERTENSION AFFECTING PREGNANCY IN SECOND TRIMESTER: Primary | ICD-10-CM

## 2021-06-23 DIAGNOSIS — Z3A.21 21 WEEKS GESTATION OF PREGNANCY: ICD-10-CM

## 2021-06-23 DIAGNOSIS — O35.2XX0 HEREDITARY FAMILIAL DISEASE AFFECTING MANAGEMENT OF MOTHER AND POSSIBLY AFFECTING FETUS, ANTEPARTUM, SINGLE OR UNSPECIFIED FETUS: ICD-10-CM

## 2021-06-23 DIAGNOSIS — Z36.86 SCREENING, ANTENATAL, FOR RISK OF PRE-TERM LABOR: ICD-10-CM

## 2021-06-23 DIAGNOSIS — O99.810 ABNORMAL MATERNAL GLUCOSE TOLERANCE, ANTEPARTUM: ICD-10-CM

## 2021-06-23 LAB
ABDOMINAL CIRCUMFERENCE: NORMAL
ABDOMINAL CIRCUMFERENCE: NORMAL
BIPARIETAL DIAMETER: NORMAL
BIPARIETAL DIAMETER: NORMAL
ESTIMATED FETAL WEIGHT: NORMAL
ESTIMATED FETAL WEIGHT: NORMAL
FEMORAL DIAMETER: NORMAL
FEMORAL DIAMETER: NORMAL
HC/AC: NORMAL
HC/AC: NORMAL
HEAD CIRCUMFERENCE: NORMAL
HEAD CIRCUMFERENCE: NORMAL
SEND OUT REPORT: NORMAL
TEST NAME: NORMAL
THYROXINE, FREE: 0.92 NG/DL (ref 0.93–1.7)
TSH SERPL DL<=0.05 MIU/L-ACNC: 4.92 MIU/L (ref 0.3–5)

## 2021-06-23 PROCEDURE — 76817 TRANSVAGINAL US OBSTETRIC: CPT | Performed by: OBSTETRICS & GYNECOLOGY

## 2021-06-23 PROCEDURE — 99243 OFF/OP CNSLTJ NEW/EST LOW 30: CPT | Performed by: OBSTETRICS & GYNECOLOGY

## 2021-06-23 PROCEDURE — 36415 COLL VENOUS BLD VENIPUNCTURE: CPT

## 2021-06-23 PROCEDURE — 76811 OB US DETAILED SNGL FETUS: CPT | Performed by: OBSTETRICS & GYNECOLOGY

## 2021-06-23 PROCEDURE — 84443 ASSAY THYROID STIM HORMONE: CPT

## 2021-06-23 PROCEDURE — 84439 ASSAY OF FREE THYROXINE: CPT

## 2021-06-23 PROCEDURE — G8427 DOCREV CUR MEDS BY ELIG CLIN: HCPCS | Performed by: OBSTETRICS & GYNECOLOGY

## 2021-06-23 PROCEDURE — 82105 ALPHA-FETOPROTEIN SERUM: CPT

## 2021-06-23 PROCEDURE — G8417 CALC BMI ABV UP PARAM F/U: HCPCS | Performed by: OBSTETRICS & GYNECOLOGY

## 2021-06-24 PROBLEM — O44.40 LOW LYING PLACENTA, ANTEPARTUM: Status: RESOLVED | Noted: 2021-06-01 | Resolved: 2021-06-24

## 2021-06-24 PROBLEM — O44.00 COMPLETE PLACENTA PREVIA NOS OR WITHOUT HEMORRHAGE, UNSPECIFIED TRIMESTER: Status: ACTIVE | Noted: 2021-06-24

## 2021-06-29 ENCOUNTER — ROUTINE PRENATAL (OUTPATIENT)
Dept: OBGYN CLINIC | Age: 31
End: 2021-06-29
Payer: MEDICARE

## 2021-06-29 VITALS
DIASTOLIC BLOOD PRESSURE: 72 MMHG | SYSTOLIC BLOOD PRESSURE: 116 MMHG | BODY MASS INDEX: 33.86 KG/M2 | HEART RATE: 99 BPM | WEIGHT: 236 LBS

## 2021-06-29 DIAGNOSIS — Z83.3 FAMILY HISTORY OF DIABETES MELLITUS IN FATHER: ICD-10-CM

## 2021-06-29 DIAGNOSIS — Z82.79 FAMILY HISTORY OF DOWN SYNDROME: ICD-10-CM

## 2021-06-29 DIAGNOSIS — Z3A.21 21 WEEKS GESTATION OF PREGNANCY: ICD-10-CM

## 2021-06-29 DIAGNOSIS — R73.09 ABNORMAL GLUCOSE TOLERANCE TEST (GTT): ICD-10-CM

## 2021-06-29 DIAGNOSIS — Z98.890 HISTORY OF BACK SURGERY: ICD-10-CM

## 2021-06-29 DIAGNOSIS — O44.00 COMPLETE PLACENTA PREVIA NOS OR WITHOUT HEMORRHAGE, UNSPECIFIED TRIMESTER: ICD-10-CM

## 2021-06-29 DIAGNOSIS — Z86.69 HISTORY OF CHIARI MALFORMATION: ICD-10-CM

## 2021-06-29 DIAGNOSIS — O34.219 VBAC (VAGINAL BIRTH AFTER CESAREAN): ICD-10-CM

## 2021-06-29 DIAGNOSIS — Z98.891 H/O: C-SECTION: ICD-10-CM

## 2021-06-29 DIAGNOSIS — E03.9 HYPOTHYROIDISM, UNSPECIFIED TYPE: ICD-10-CM

## 2021-06-29 DIAGNOSIS — Z82.79 FAMILY HISTORY OF CONGENITAL HEART DEFECT: ICD-10-CM

## 2021-06-29 DIAGNOSIS — G96.191 TARLOV CYSTS: Primary | ICD-10-CM

## 2021-06-29 DIAGNOSIS — G90.A POTS (POSTURAL ORTHOSTATIC TACHYCARDIA SYNDROME): ICD-10-CM

## 2021-06-29 PROCEDURE — G8417 CALC BMI ABV UP PARAM F/U: HCPCS | Performed by: OBSTETRICS & GYNECOLOGY

## 2021-06-29 PROCEDURE — 1036F TOBACCO NON-USER: CPT | Performed by: OBSTETRICS & GYNECOLOGY

## 2021-06-29 PROCEDURE — G8427 DOCREV CUR MEDS BY ELIG CLIN: HCPCS | Performed by: OBSTETRICS & GYNECOLOGY

## 2021-06-29 PROCEDURE — 99214 OFFICE O/P EST MOD 30 MIN: CPT | Performed by: OBSTETRICS & GYNECOLOGY
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
## 2021-07-01 ENCOUNTER — HOSPITAL ENCOUNTER (OUTPATIENT)
Age: 31
Setting detail: SPECIMEN
Discharge: HOME | End: 2021-07-01
Payer: MEDICARE

## 2021-07-02 ENCOUNTER — HOSPITAL ENCOUNTER (OUTPATIENT)
Age: 31
Discharge: HOME | End: 2021-07-02
Payer: MEDICARE

## 2021-07-02 DIAGNOSIS — Z3A.17 17 WEEKS GESTATION OF PREGNANCY: ICD-10-CM

## 2021-07-02 DIAGNOSIS — R73.09 ABNORMAL GLUCOSE TOLERANCE TEST (GTT): ICD-10-CM

## 2021-07-02 DIAGNOSIS — E03.9 HYPOTHYROIDISM, UNSPECIFIED TYPE: ICD-10-CM

## 2021-07-02 LAB
-: NORMAL
AMORPHOUS: NORMAL
AMOUNT GLUCOSE GIVEN: ABNORMAL G
BACTERIA: NORMAL
BILIRUBIN URINE: NEGATIVE
BUN BLDV-MCNC: 6 MG/DL (ref 6–20)
CASTS UA: NORMAL /LPF (ref 0–8)
COLOR: YELLOW
COMMENT UA: ABNORMAL
CREAT SERPL-MCNC: 0.54 MG/DL (ref 0.5–0.9)
CREAT SERPL-MCNC: 0.54 MG/DL (ref 0.5–0.9)
CREATININE CLEARANCE: 148.1 ML/MIN/BSA (ref 71–151)
CREATININE TIMED UR: NORMAL MG/ X H
CREATININE URINE: 106.4 MG/DL (ref 28–217)
CREATININE URINE: 111.1 MG/DL
CREATININE, 24H UR: 1557 MG/24 H (ref 740–1570)
CRYSTALS, UA: NORMAL /HPF
EPITHELIAL CELLS UA: NORMAL /HPF (ref 0–5)
ESTIMATED AVERAGE GLUCOSE: 100 MG/DL
GFR AFRICAN AMERICAN: >60 ML/MIN
GFR NON-AFRICAN AMERICAN: >60 ML/MIN
GFR SERPL CREATININE-BSD FRML MDRD: NORMAL ML/MIN/{1.73_M2}
GFR SERPL CREATININE-BSD FRML MDRD: NORMAL ML/MIN/{1.73_M2}
GLUCOSE FASTING: 85 MG/DL (ref 65–99)
GLUCOSE TOLERANCE TEST 1 HOUR: 166 MG/DL (ref 65–184)
GLUCOSE TOLERANCE TEST 2 HOUR: 166 MG/DL (ref 65–139)
GLUCOSE TOLERANCE TEST 3 HOUR: 180 MG/DL (ref 65–130)
GLUCOSE URINE: NEGATIVE
HBA1C MFR BLD: 5.1 % (ref 4–6)
HOURS COLLECTED: 24 H
HOURS COLLECTED: 24 H
KETONES, URINE: NEGATIVE
LENGTH OF COLLECTION: 24 H
LEUKOCYTE ESTERASE, URINE: ABNORMAL
MUCUS: NORMAL
NITRITE, URINE: NEGATIVE
OTHER OBSERVATIONS UA: NORMAL
PATIENT HEIGHT: 178 CM
PATIENT WEIGHT: 107 KG
PH UA: 6 (ref 5–8)
PROTEIN 24 HOUR URINE: 98 MG/24 H
PROTEIN UA: NEGATIVE
PROTEIN,TOT TIMED UR: NORMAL MG/X H
RBC UA: NORMAL /HPF (ref 0–4)
RENAL EPITHELIAL, UA: NORMAL /HPF
SPECIFIC GRAVITY UA: 1.02 (ref 1–1.03)
TRICHOMONAS: NORMAL
TURBIDITY: CLEAR
URINE HGB: NEGATIVE
URINE TOTAL PROTEIN: 7 MG/DL
UROBILINOGEN, URINE: NORMAL
VOLUME: 1401 ML
WBC UA: NORMAL /HPF (ref 0–5)
YEAST: NORMAL

## 2021-07-02 PROCEDURE — 81001 URINALYSIS AUTO W/SCOPE: CPT

## 2021-07-02 PROCEDURE — 82575 CREATININE CLEARANCE TEST: CPT

## 2021-07-02 PROCEDURE — 84520 ASSAY OF UREA NITROGEN: CPT

## 2021-07-02 PROCEDURE — 83036 HEMOGLOBIN GLYCOSYLATED A1C: CPT

## 2021-07-02 PROCEDURE — 82565 ASSAY OF CREATININE: CPT

## 2021-07-02 PROCEDURE — 82570 ASSAY OF URINE CREATININE: CPT

## 2021-07-02 PROCEDURE — 82952 GTT-ADDED SAMPLES: CPT

## 2021-07-02 PROCEDURE — 82951 GLUCOSE TOLERANCE TEST (GTT): CPT

## 2021-07-02 PROCEDURE — 84156 ASSAY OF PROTEIN URINE: CPT

## 2021-07-02 RX ORDER — LEVOTHYROXINE SODIUM 0.1 MG/1
TABLET ORAL
Qty: 30 TABLET | Refills: 2 | Status: SHIPPED | OUTPATIENT
Start: 2021-07-02 | End: 2022-02-07 | Stop reason: SDUPTHER

## 2021-07-02 NOTE — RESULT ENCOUNTER NOTE
Spoke with laboratory at [facility], the lab specimens will be ran now and results will populate in epic.

## 2021-07-06 ENCOUNTER — TELEPHONE (OUTPATIENT)
Dept: PERINATAL CARE | Age: 31
End: 2021-07-06

## 2021-07-06 DIAGNOSIS — O24.419 GESTATIONAL DIABETES MELLITUS (GDM), ANTEPARTUM, GESTATIONAL DIABETES METHOD OF CONTROL UNSPECIFIED: Primary | ICD-10-CM

## 2021-07-06 DIAGNOSIS — O99.810 PREGNANCY WITH ABNORMAL GLUCOSE TOLERANCE TEST (GTT): Primary | ICD-10-CM

## 2021-07-06 NOTE — TELEPHONE ENCOUNTER
Rx called into the pharmacy for a glucometer, and supplies pt is to check her sugars 4 x a day with a month supply and 5 refills. Rx called in by AMALIA CARR

## 2021-07-09 ENCOUNTER — HOSPITAL ENCOUNTER (OUTPATIENT)
Dept: DIABETES SERVICES | Age: 31
Setting detail: THERAPIES SERIES
Discharge: HOME | End: 2021-07-09
Payer: MEDICARE

## 2021-07-09 DIAGNOSIS — O24.415 GESTATIONAL DIABETES MELLITUS (GDM) IN SECOND TRIMESTER CONTROLLED ON ORAL HYPOGLYCEMIC DRUG: Primary | ICD-10-CM

## 2021-07-09 PROCEDURE — G0108 DIAB MANAGE TRN  PER INDIV: HCPCS

## 2021-07-09 NOTE — PROGRESS NOTES
Gestational Diabetes Education Progress Note - PHONE  This visit is a TELEPHONE encounter (During HFYOK-65 public health emergency). Pursuant to the emergency declaration under the [facility], [address] [place] [address] authority and the [employer] Act, this TELEPHONE Visit was conducted with patient's (and/or legal guardian's) consent, to reduce the patient's risk of exposure to COVID-19 and provide necessary medical care. The patient (and/or legal guardian) has also been advised to contact this office for worsening conditions or problems, and seek emergency medical treatment and/or call 911 if deemed necessary. Patient identification was verified at the start of the visit: Yes    Total time spent for this encounter: 1 hour    Services were provided through a video synchronous discussion virtually to substitute for in-person clinic visit. Patient and provider were located at their individual home/office. Assessment of learning needs and self care was done - see Gestational Diabetes Screening  DEVON - Estimated Date of Delivery: 11/3/21            Teaching provided at this session included:   _X__ Definition of gestational diabetes    _X_ Risk factors   _X__ Effects on pregnancy       _ X_ Management   _X__ Self-monitoring of blood sugar (FBS and 2 hrs postprandial)   _X__ Blood sugar targets FBS 65-90 and <120 2 hrs postprandial)   _X__  Role of Insulins and use of injection methods  _x__ Pen   __x_ Karron Hopping    _x___Hyperglycemia  _x__ Hypoglycemia and treatment           Meal planning:   _X_  Avoid fruit juice and sugary beverages.               _X_  Aim to eat small frequent meals and snacks. (ie., 3 + 3)                _X__  Eat balanced meals according to divided dinner plate sample            Planned follow-up:    x__  Another appointment /  phone call support has been scheduled for RD on              _X_ Patient is to report blood glucose test results to physician as directed by  prescribing physician. Resource materials provided via mail ( by Ev Velázquez on 7/6?21)  prior to call includes:  Patient had not received in mail the packet by start of call - writer then  Sent out via my chart attachment - GDM booklet and BG log sheets during virtual phone call session 7/9/21rs )  (   Gestational Diabetes Mellitus ( GDM) toolkit form [place] gestational diabetes postpartum care learning collaborative 2018. Gestational diabetes handout from [facility] jan 2016  \"Simple Guidelines for meal planning with gestational diabetes\" handout 3 meals and 3 snacks  SMBG sheets to fax back to [facility] weekly  BD  healthy injection site selection and rotation with 6 mm insulin syringe and 4 mm pen needle  Did you have gestational diabetes when you were pregnant? Handout from [facility]  April 2014      Patient has set goal for practice of diabetes self management :  SMBG- check fasting and 2 hours PP, eat 3 meals and 3 snacks/day, avoid sugary beverages. Patient stated has use of a home BG meter and has /  has not started to test BG. Self report BG are fasting 80- 86. Post meals in 90 - 125. Patient stated understanding of role of insulin in care of gestational diabetes and if needs support to start to use insulin follow up care will be planned.     Jennifer Vance RN CDE

## 2021-07-22 LAB
AFP INTERPRETATION: NORMAL
AFP MOM: 1.28
AFP SPECIMEN: NORMAL
AFP: 65 NG/ML
DATE OF BIRTH: NORMAL
DATING METHOD: NORMAL
DETERMINED BY: NORMAL
DIABETIC: NEGATIVE
DONOR EGG?: NORMAL
DUE DATE: NORMAL
ESTIMATED DUE DATE: NORMAL
FAMILY HISTORY NTD: POSITIVE
GESTATIONAL AGE: NORMAL
IN VITRO FERTILIZATION: NORMAL
INSULIN REQ DIABETES: NO
LAST MENSTRUAL PERIOD: NORMAL
MATERNAL AGE AT EDD: 32.6 YR
MATERNAL WEIGHT: 236
MONOCHORIONIC TWINS: NORMAL
NUMBER OF FETUSES: NORMAL
PATIENT WEIGHT UNITS: NORMAL
PATIENT WEIGHT: NORMAL
RACE (MATERNAL): NORMAL
RACE: NORMAL
REPEAT SPECIMEN?: NORMAL
SMOKING: NORMAL
SMOKING: NORMAL
VALPROIC/CARBAMAZEP: NORMAL
ZZ NTE CLEAN UP: HISTORY: NO

## 2021-07-27 ENCOUNTER — ROUTINE PRENATAL (OUTPATIENT)
Dept: OBGYN CLINIC | Age: 31
End: 2021-07-27
Payer: MEDICARE

## 2021-07-27 VITALS
WEIGHT: 237 LBS | BODY MASS INDEX: 34.01 KG/M2 | SYSTOLIC BLOOD PRESSURE: 119 MMHG | HEART RATE: 91 BPM | DIASTOLIC BLOOD PRESSURE: 72 MMHG

## 2021-07-27 DIAGNOSIS — Z83.3 FAMILY HISTORY OF DIABETES MELLITUS IN FATHER: ICD-10-CM

## 2021-07-27 DIAGNOSIS — Z98.891 H/O: C-SECTION: ICD-10-CM

## 2021-07-27 DIAGNOSIS — G89.29 CHRONIC LOW BACK PAIN, UNSPECIFIED BACK PAIN LATERALITY, UNSPECIFIED WHETHER SCIATICA PRESENT: ICD-10-CM

## 2021-07-27 DIAGNOSIS — Z3A.25 25 WEEKS GESTATION OF PREGNANCY: Primary | ICD-10-CM

## 2021-07-27 DIAGNOSIS — Z86.69 HISTORY OF CHIARI MALFORMATION: ICD-10-CM

## 2021-07-27 DIAGNOSIS — Z98.890 HISTORY OF BACK SURGERY: ICD-10-CM

## 2021-07-27 DIAGNOSIS — Z82.79 FAMILY HISTORY OF CONGENITAL HEART DEFECT: ICD-10-CM

## 2021-07-27 DIAGNOSIS — G96.191 TARLOV CYSTS: ICD-10-CM

## 2021-07-27 DIAGNOSIS — Z82.79 FAMILY HISTORY OF DOWN SYNDROME: ICD-10-CM

## 2021-07-27 DIAGNOSIS — O44.00 COMPLETE PLACENTA PREVIA NOS OR WITHOUT HEMORRHAGE, UNSPECIFIED TRIMESTER: ICD-10-CM

## 2021-07-27 DIAGNOSIS — M54.50 CHRONIC LOW BACK PAIN, UNSPECIFIED BACK PAIN LATERALITY, UNSPECIFIED WHETHER SCIATICA PRESENT: ICD-10-CM

## 2021-07-27 DIAGNOSIS — G90.A POTS (POSTURAL ORTHOSTATIC TACHYCARDIA SYNDROME): ICD-10-CM

## 2021-07-27 DIAGNOSIS — O24.410 DIET CONTROLLED GESTATIONAL DIABETES MELLITUS (GDM) IN SECOND TRIMESTER: ICD-10-CM

## 2021-07-27 DIAGNOSIS — R73.09 ABNORMAL GLUCOSE TOLERANCE TEST (GTT): ICD-10-CM

## 2021-07-27 DIAGNOSIS — E03.9 HYPOTHYROIDISM, UNSPECIFIED TYPE: ICD-10-CM

## 2021-07-27 DIAGNOSIS — O34.219 VBAC (VAGINAL BIRTH AFTER CESAREAN): ICD-10-CM

## 2021-07-27 PROCEDURE — 1036F TOBACCO NON-USER: CPT | Performed by: NURSE PRACTITIONER

## 2021-07-27 PROCEDURE — 99213 OFFICE O/P EST LOW 20 MIN: CPT | Performed by: NURSE PRACTITIONER

## 2021-07-27 PROCEDURE — G8427 DOCREV CUR MEDS BY ELIG CLIN: HCPCS | Performed by: NURSE PRACTITIONER

## 2021-07-27 PROCEDURE — G8417 CALC BMI ABV UP PARAM F/U: HCPCS | Performed by: NURSE PRACTITIONER

## 2021-07-27 RX ORDER — DEXTROSE 4 G
TABLET,CHEWABLE ORAL
Status: ON HOLD | COMMUNITY
Start: 2021-07-06 | End: 2021-10-11 | Stop reason: HOSPADM

## 2021-07-27 RX ORDER — CALCIUM CITRATE/VITAMIN D3 200MG-6.25
TABLET ORAL
Status: ON HOLD | COMMUNITY
Start: 2021-07-06 | End: 2021-10-11 | Stop reason: HOSPADM

## 2021-07-27 RX ORDER — GLUCOSAM/CHON-MSM1/C/MANG/BOSW 500-416.6
TABLET ORAL
Status: ON HOLD | COMMUNITY
Start: 2021-07-06 | End: 2021-10-11 | Stop reason: HOSPADM
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
## 2021-07-29 ENCOUNTER — TELEPHONE (OUTPATIENT)
Dept: OBGYN CLINIC | Age: 31
End: 2021-07-29

## 2021-08-04 ENCOUNTER — ROUTINE PRENATAL (OUTPATIENT)
Dept: PERINATAL CARE | Age: 31
End: 2021-08-04
Payer: MEDICARE

## 2021-08-04 VITALS
TEMPERATURE: 97.2 F | SYSTOLIC BLOOD PRESSURE: 124 MMHG | DIASTOLIC BLOOD PRESSURE: 80 MMHG | HEIGHT: 70 IN | HEART RATE: 91 BPM | BODY MASS INDEX: 34.22 KG/M2 | RESPIRATION RATE: 18 BRPM | WEIGHT: 239 LBS

## 2021-08-04 DIAGNOSIS — Z36.4 ANTENATAL SCREENING FOR FETAL GROWTH RETARDATION USING ULTRASONICS: ICD-10-CM

## 2021-08-04 DIAGNOSIS — O35.2XX0 HEREDITARY FAMILIAL DISEASE AFFECTING MANAGEMENT OF MOTHER AND POSSIBLY AFFECTING FETUS, ANTEPARTUM, SINGLE OR UNSPECIFIED FETUS: ICD-10-CM

## 2021-08-04 DIAGNOSIS — O24.119 PRE-EXISTING TYPE 2 DIABETES MELLITUS DURING PREGNANCY, ANTEPARTUM: Primary | ICD-10-CM

## 2021-08-04 DIAGNOSIS — O99.282 HYPOTHYROIDISM AFFECTING PREGNANCY IN SECOND TRIMESTER: ICD-10-CM

## 2021-08-04 DIAGNOSIS — O44.40 LOW IMPLANTATION OF PLACENTA WITHOUT HEMORRHAGE: ICD-10-CM

## 2021-08-04 DIAGNOSIS — Z3A.26 26 WEEKS GESTATION OF PREGNANCY: ICD-10-CM

## 2021-08-04 DIAGNOSIS — O99.891 CURRENT MATERNAL CONDITION AFFECTING PREGNANCY: ICD-10-CM

## 2021-08-04 DIAGNOSIS — O16.2 HYPERTENSION AFFECTING PREGNANCY IN SECOND TRIMESTER: ICD-10-CM

## 2021-08-04 DIAGNOSIS — E03.9 HYPOTHYROIDISM AFFECTING PREGNANCY IN SECOND TRIMESTER: ICD-10-CM

## 2021-08-04 LAB
ABDOMINAL CIRCUMFERENCE: NORMAL
ABDOMINAL CIRCUMFERENCE: NORMAL
BIPARIETAL DIAMETER: NORMAL
BIPARIETAL DIAMETER: NORMAL
ESTIMATED FETAL WEIGHT: NORMAL
ESTIMATED FETAL WEIGHT: NORMAL
FEMORAL DIAMETER: NORMAL
FEMORAL DIAMETER: NORMAL
HC/AC: NORMAL
HC/AC: NORMAL
HEAD CIRCUMFERENCE: NORMAL
HEAD CIRCUMFERENCE: NORMAL

## 2021-08-04 PROCEDURE — 76825 ECHO EXAM OF FETAL HEART: CPT | Performed by: OBSTETRICS & GYNECOLOGY

## 2021-08-04 PROCEDURE — 76816 OB US FOLLOW-UP PER FETUS: CPT | Performed by: OBSTETRICS & GYNECOLOGY

## 2021-08-04 PROCEDURE — 76817 TRANSVAGINAL US OBSTETRIC: CPT | Performed by: OBSTETRICS & GYNECOLOGY

## 2021-08-04 PROCEDURE — G8417 CALC BMI ABV UP PARAM F/U: HCPCS | Performed by: OBSTETRICS & GYNECOLOGY

## 2021-08-04 PROCEDURE — 93325 DOPPLER ECHO COLOR FLOW MAPG: CPT | Performed by: OBSTETRICS & GYNECOLOGY

## 2021-08-04 PROCEDURE — 2022F DILAT RTA XM EVC RTNOPTHY: CPT | Performed by: OBSTETRICS & GYNECOLOGY

## 2021-08-04 PROCEDURE — 76827 ECHO EXAM OF FETAL HEART: CPT | Performed by: OBSTETRICS & GYNECOLOGY

## 2021-08-04 PROCEDURE — 76820 UMBILICAL ARTERY ECHO: CPT | Performed by: OBSTETRICS & GYNECOLOGY

## 2021-08-04 PROCEDURE — 99242 OFF/OP CONSLTJ NEW/EST SF 20: CPT | Performed by: OBSTETRICS & GYNECOLOGY

## 2021-08-04 PROCEDURE — G8427 DOCREV CUR MEDS BY ELIG CLIN: HCPCS | Performed by: OBSTETRICS & GYNECOLOGY

## 2021-08-09 ENCOUNTER — HOSPITAL ENCOUNTER (OUTPATIENT)
Dept: DIABETES SERVICES | Age: 31
Setting detail: THERAPIES SERIES
Discharge: HOME | End: 2021-08-09
Payer: MEDICARE

## 2021-08-09 DIAGNOSIS — O24.415 GESTATIONAL DIABETES MELLITUS (GDM) IN SECOND TRIMESTER CONTROLLED ON ORAL HYPOGLYCEMIC DRUG: Primary | ICD-10-CM

## 2021-08-09 PROCEDURE — G0108 DIAB MANAGE TRN  PER INDIV: HCPCS

## 2021-08-10 ENCOUNTER — ROUTINE PRENATAL (OUTPATIENT)
Dept: OBGYN CLINIC | Age: 31
End: 2021-08-10
Payer: MEDICARE

## 2021-08-10 ENCOUNTER — TELEPHONE (OUTPATIENT)
Dept: OBGYN CLINIC | Age: 31
End: 2021-08-10

## 2021-08-10 ENCOUNTER — HOSPITAL ENCOUNTER (OUTPATIENT)
Age: 31
Discharge: HOME | End: 2021-08-10
Payer: MEDICARE

## 2021-08-10 VITALS
BODY MASS INDEX: 34.44 KG/M2 | SYSTOLIC BLOOD PRESSURE: 114 MMHG | HEART RATE: 93 BPM | DIASTOLIC BLOOD PRESSURE: 70 MMHG | WEIGHT: 240 LBS

## 2021-08-10 DIAGNOSIS — G90.A POTS (POSTURAL ORTHOSTATIC TACHYCARDIA SYNDROME): ICD-10-CM

## 2021-08-10 DIAGNOSIS — G90.A POTS (POSTURAL ORTHOSTATIC TACHYCARDIA SYNDROME): Primary | ICD-10-CM

## 2021-08-10 DIAGNOSIS — O34.219 VBAC (VAGINAL BIRTH AFTER CESAREAN): ICD-10-CM

## 2021-08-10 DIAGNOSIS — G96.191 TARLOV CYSTS: ICD-10-CM

## 2021-08-10 DIAGNOSIS — Z86.69 HISTORY OF CHIARI MALFORMATION: ICD-10-CM

## 2021-08-10 DIAGNOSIS — R73.09 ABNORMAL GLUCOSE TOLERANCE TEST (GTT): ICD-10-CM

## 2021-08-10 DIAGNOSIS — Z83.3 FAMILY HISTORY OF DIABETES MELLITUS IN FATHER: ICD-10-CM

## 2021-08-10 DIAGNOSIS — Z98.890 HISTORY OF BACK SURGERY: ICD-10-CM

## 2021-08-10 DIAGNOSIS — O09.93 HIGH-RISK PREGNANCY IN THIRD TRIMESTER: Primary | ICD-10-CM

## 2021-08-10 DIAGNOSIS — Z3A.27 27 WEEKS GESTATION OF PREGNANCY: ICD-10-CM

## 2021-08-10 DIAGNOSIS — O24.415 GESTATIONAL DIABETES MELLITUS (GDM) IN SECOND TRIMESTER CONTROLLED ON ORAL HYPOGLYCEMIC DRUG: ICD-10-CM

## 2021-08-10 DIAGNOSIS — O44.40 LOW-LYING PLACENTA: ICD-10-CM

## 2021-08-10 DIAGNOSIS — E03.9 HYPOTHYROIDISM DURING PREGNANCY IN SECOND TRIMESTER: Primary | ICD-10-CM

## 2021-08-10 DIAGNOSIS — O09.93 HIGH-RISK PREGNANCY IN THIRD TRIMESTER: ICD-10-CM

## 2021-08-10 DIAGNOSIS — E03.9 HYPOTHYROIDISM, UNSPECIFIED TYPE: ICD-10-CM

## 2021-08-10 DIAGNOSIS — Z82.79 FAMILY HISTORY OF CONGENITAL HEART DEFECT: ICD-10-CM

## 2021-08-10 DIAGNOSIS — Z82.79 FAMILY HISTORY OF DOWN SYNDROME: ICD-10-CM

## 2021-08-10 DIAGNOSIS — O99.282 HYPOTHYROIDISM DURING PREGNANCY IN SECOND TRIMESTER: Primary | ICD-10-CM

## 2021-08-10 DIAGNOSIS — Z98.891 H/O: C-SECTION: ICD-10-CM

## 2021-08-10 LAB
-: ABNORMAL
ABSOLUTE EOS #: 0.1 K/UL (ref 0–0.4)
ABSOLUTE IMMATURE GRANULOCYTE: ABNORMAL K/UL (ref 0–0.3)
ABSOLUTE LYMPH #: 1.3 K/UL (ref 1–4.8)
ABSOLUTE MONO #: 0.5 K/UL (ref 0.1–1.3)
AMORPHOUS: ABNORMAL
BACTERIA: ABNORMAL
BASOPHILS # BLD: 0 % (ref 0–2)
BASOPHILS ABSOLUTE: 0 K/UL (ref 0–0.2)
BILIRUBIN URINE: NEGATIVE
CASTS UA: ABNORMAL /LPF
COLOR: YELLOW
COMMENT UA: ABNORMAL
CRYSTALS, UA: ABNORMAL /HPF
CRYSTALS, UA: ABNORMAL /HPF
DIFFERENTIAL TYPE: ABNORMAL
EOSINOPHILS RELATIVE PERCENT: 1 % (ref 0–4)
EPITHELIAL CELLS UA: ABNORMAL /HPF
GLUCOSE URINE: NEGATIVE
HCT VFR BLD CALC: 36 % (ref 36–46)
HEMOGLOBIN: 12 G/DL (ref 12–16)
IMMATURE GRANULOCYTES: ABNORMAL %
KETONES, URINE: NEGATIVE
LEUKOCYTE ESTERASE, URINE: ABNORMAL
LYMPHOCYTES # BLD: 15 % (ref 24–44)
MCH RBC QN AUTO: 29.2 PG (ref 26–34)
MCHC RBC AUTO-ENTMCNC: 33.5 G/DL (ref 31–37)
MCV RBC AUTO: 87.2 FL (ref 80–100)
MONOCYTES # BLD: 6 % (ref 1–7)
MUCUS: ABNORMAL
NITRITE, URINE: NEGATIVE
NRBC AUTOMATED: ABNORMAL PER 100 WBC
OTHER OBSERVATIONS UA: ABNORMAL
PDW BLD-RTO: 13.6 % (ref 11.5–14.9)
PH UA: 6 (ref 5–8)
PLATELET # BLD: 218 K/UL (ref 150–450)
PLATELET ESTIMATE: ABNORMAL
PMV BLD AUTO: 7.6 FL (ref 6–12)
PROTEIN UA: NEGATIVE
RBC # BLD: 4.13 M/UL (ref 4–5.2)
RBC # BLD: ABNORMAL 10*6/UL
RBC UA: ABNORMAL /HPF
RENAL EPITHELIAL, UA: ABNORMAL /HPF
SEG NEUTROPHILS: 78 % (ref 36–66)
SEGMENTED NEUTROPHILS ABSOLUTE COUNT: 6.9 K/UL (ref 1.3–9.1)
SPECIFIC GRAVITY UA: 1.02 (ref 1–1.03)
THYROXINE, FREE: 0.93 NG/DL (ref 0.93–1.7)
TRICHOMONAS: ABNORMAL
TSH SERPL DL<=0.05 MIU/L-ACNC: 3.59 MIU/L (ref 0.3–5)
TURBIDITY: ABNORMAL
URINE HGB: NEGATIVE
UROBILINOGEN, URINE: NORMAL
WBC # BLD: 8.8 K/UL (ref 3.5–11)
WBC # BLD: ABNORMAL 10*3/UL
WBC UA: ABNORMAL /HPF
YEAST: ABNORMAL

## 2021-08-10 PROCEDURE — G8417 CALC BMI ABV UP PARAM F/U: HCPCS | Performed by: NURSE PRACTITIONER

## 2021-08-10 PROCEDURE — 85025 COMPLETE CBC W/AUTO DIFF WBC: CPT

## 2021-08-10 PROCEDURE — 99213 OFFICE O/P EST LOW 20 MIN: CPT | Performed by: NURSE PRACTITIONER

## 2021-08-10 PROCEDURE — 81001 URINALYSIS AUTO W/SCOPE: CPT

## 2021-08-10 PROCEDURE — 36415 COLL VENOUS BLD VENIPUNCTURE: CPT

## 2021-08-10 PROCEDURE — 84443 ASSAY THYROID STIM HORMONE: CPT

## 2021-08-10 PROCEDURE — 84439 ASSAY OF FREE THYROXINE: CPT

## 2021-08-10 PROCEDURE — 1036F TOBACCO NON-USER: CPT | Performed by: NURSE PRACTITIONER

## 2021-08-10 PROCEDURE — G8427 DOCREV CUR MEDS BY ELIG CLIN: HCPCS | Performed by: NURSE PRACTITIONER

## 2021-08-10 RX ORDER — LEVOTHYROXINE SODIUM 0.03 MG/1
25 TABLET ORAL DAILY
Qty: 30 TABLET | Refills: 1 | Status: SHIPPED | OUTPATIENT
Start: 2021-08-10 | End: 2021-09-09
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
## 2021-08-10 NOTE — TELEPHONE ENCOUNTER
----- Message from DEAN Christensen CNP sent at 8/10/2021  4:28 PM EDT -----  Repeat TSH and free T4 in 4 weeks. If patient currently on 100mcg PO of levothyroxine, increase to 125mcg PO daily. One refill.   Hold UA for C&S results

## 2021-08-24 ENCOUNTER — ROUTINE PRENATAL (OUTPATIENT)
Dept: OBGYN CLINIC | Age: 31
End: 2021-08-24
Payer: MEDICARE

## 2021-08-24 VITALS
SYSTOLIC BLOOD PRESSURE: 112 MMHG | HEART RATE: 93 BPM | BODY MASS INDEX: 34.44 KG/M2 | WEIGHT: 240 LBS | DIASTOLIC BLOOD PRESSURE: 68 MMHG

## 2021-08-24 DIAGNOSIS — R73.09 ABNORMAL GLUCOSE TOLERANCE TEST (GTT): ICD-10-CM

## 2021-08-24 DIAGNOSIS — O09.92 HIGH-RISK PREGNANCY IN SECOND TRIMESTER: Primary | ICD-10-CM

## 2021-08-24 DIAGNOSIS — Z82.79 FAMILY HISTORY OF CONGENITAL HEART DEFECT: ICD-10-CM

## 2021-08-24 DIAGNOSIS — Z83.3 FAMILY HISTORY OF DIABETES MELLITUS IN FATHER: ICD-10-CM

## 2021-08-24 DIAGNOSIS — G90.A POTS (POSTURAL ORTHOSTATIC TACHYCARDIA SYNDROME): ICD-10-CM

## 2021-08-24 DIAGNOSIS — G96.191 TARLOV CYSTS: ICD-10-CM

## 2021-08-24 DIAGNOSIS — Z82.79 FAMILY HISTORY OF DOWN SYNDROME: ICD-10-CM

## 2021-08-24 DIAGNOSIS — O24.419 GESTATIONAL DIABETES MELLITUS (GDM), ANTEPARTUM, GESTATIONAL DIABETES METHOD OF CONTROL UNSPECIFIED: ICD-10-CM

## 2021-08-24 DIAGNOSIS — Z98.890 HISTORY OF BACK SURGERY: ICD-10-CM

## 2021-08-24 DIAGNOSIS — E03.9 HYPOTHYROIDISM, UNSPECIFIED TYPE: ICD-10-CM

## 2021-08-24 DIAGNOSIS — Z98.891 H/O: C-SECTION: ICD-10-CM

## 2021-08-24 DIAGNOSIS — O44.40 LOW-LYING PLACENTA: ICD-10-CM

## 2021-08-24 DIAGNOSIS — Z86.69 HISTORY OF CHIARI MALFORMATION: ICD-10-CM

## 2021-08-24 DIAGNOSIS — Z3A.29 29 WEEKS GESTATION OF PREGNANCY: ICD-10-CM

## 2021-08-24 DIAGNOSIS — O34.219 VBAC (VAGINAL BIRTH AFTER CESAREAN): ICD-10-CM

## 2021-08-24 PROCEDURE — G8427 DOCREV CUR MEDS BY ELIG CLIN: HCPCS | Performed by: OBSTETRICS & GYNECOLOGY

## 2021-08-24 PROCEDURE — 99213 OFFICE O/P EST LOW 20 MIN: CPT | Performed by: OBSTETRICS & GYNECOLOGY

## 2021-08-24 PROCEDURE — 1036F TOBACCO NON-USER: CPT | Performed by: OBSTETRICS & GYNECOLOGY

## 2021-08-24 PROCEDURE — G8417 CALC BMI ABV UP PARAM F/U: HCPCS | Performed by: OBSTETRICS & GYNECOLOGY
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
## 2021-09-01 ENCOUNTER — ROUTINE PRENATAL (OUTPATIENT)
Dept: PERINATAL CARE | Age: 31
End: 2021-09-01
Payer: MEDICARE

## 2021-09-01 VITALS
DIASTOLIC BLOOD PRESSURE: 75 MMHG | WEIGHT: 243 LBS | TEMPERATURE: 97 F | HEIGHT: 70 IN | SYSTOLIC BLOOD PRESSURE: 114 MMHG | RESPIRATION RATE: 18 BRPM | HEART RATE: 106 BPM | BODY MASS INDEX: 34.79 KG/M2

## 2021-09-01 DIAGNOSIS — Z13.89 ENCOUNTER FOR ROUTINE SCREENING FOR MALFORMATION USING ULTRASONICS: ICD-10-CM

## 2021-09-01 DIAGNOSIS — E03.9 HYPOTHYROIDISM AFFECTING PREGNANCY IN THIRD TRIMESTER: ICD-10-CM

## 2021-09-01 DIAGNOSIS — O99.283 HYPOTHYROIDISM AFFECTING PREGNANCY IN THIRD TRIMESTER: ICD-10-CM

## 2021-09-01 DIAGNOSIS — Z3A.30 30 WEEKS GESTATION OF PREGNANCY: ICD-10-CM

## 2021-09-01 DIAGNOSIS — O24.119 PRE-EXISTING TYPE 2 DIABETES MELLITUS DURING PREGNANCY, ANTEPARTUM: ICD-10-CM

## 2021-09-01 DIAGNOSIS — O99.891 CURRENT MATERNAL CONDITION AFFECTING PREGNANCY: ICD-10-CM

## 2021-09-01 DIAGNOSIS — O44.40 LOW IMPLANTATION OF PLACENTA WITHOUT HEMORRHAGE: ICD-10-CM

## 2021-09-01 DIAGNOSIS — Z36.4 ANTENATAL SCREENING FOR FETAL GROWTH RETARDATION USING ULTRASONICS: ICD-10-CM

## 2021-09-01 DIAGNOSIS — O16.3 HYPERTENSION AFFECTING PREGNANCY IN THIRD TRIMESTER: Primary | ICD-10-CM

## 2021-09-01 PROCEDURE — 76820 UMBILICAL ARTERY ECHO: CPT | Performed by: OBSTETRICS & GYNECOLOGY

## 2021-09-01 PROCEDURE — 76817 TRANSVAGINAL US OBSTETRIC: CPT | Performed by: OBSTETRICS & GYNECOLOGY

## 2021-09-01 PROCEDURE — 76805 OB US >/= 14 WKS SNGL FETUS: CPT | Performed by: OBSTETRICS & GYNECOLOGY

## 2021-09-01 PROCEDURE — 76819 FETAL BIOPHYS PROFIL W/O NST: CPT | Performed by: OBSTETRICS & GYNECOLOGY

## 2021-09-06 ENCOUNTER — HOSPITAL ENCOUNTER (OUTPATIENT)
Age: 31
Discharge: HOME | End: 2021-09-06
Attending: OBSTETRICS & GYNECOLOGY | Admitting: OBSTETRICS & GYNECOLOGY
Payer: MEDICARE

## 2021-09-06 VITALS
DIASTOLIC BLOOD PRESSURE: 61 MMHG | HEART RATE: 109 BPM | RESPIRATION RATE: 20 BRPM | SYSTOLIC BLOOD PRESSURE: 119 MMHG | HEIGHT: 70 IN | WEIGHT: 240 LBS | BODY MASS INDEX: 34.36 KG/M2 | OXYGEN SATURATION: 100 %

## 2021-09-06 PROBLEM — Z3A.31 31 WEEKS GESTATION OF PREGNANCY: Status: ACTIVE | Noted: 2021-09-06

## 2021-09-06 LAB
-: ABNORMAL
ABSOLUTE EOS #: 0 K/UL (ref 0–0.4)
ABSOLUTE IMMATURE GRANULOCYTE: 0 K/UL (ref 0–0.3)
ABSOLUTE LYMPH #: 0.43 K/UL (ref 1–4.8)
ABSOLUTE MONO #: 0.43 K/UL (ref 0.1–0.8)
ALBUMIN SERPL-MCNC: 3 G/DL (ref 3.5–5.2)
ALBUMIN/GLOBULIN RATIO: 1.1 (ref 1–2.5)
ALP BLD-CCNC: 89 U/L (ref 35–104)
ALT SERPL-CCNC: 15 U/L (ref 5–33)
AMORPHOUS: ABNORMAL
ANION GAP SERPL CALCULATED.3IONS-SCNC: 7 MMOL/L (ref 9–17)
AST SERPL-CCNC: 16 U/L
BACTERIA: ABNORMAL
BASOPHILS # BLD: 0 % (ref 0–2)
BASOPHILS ABSOLUTE: 0 K/UL (ref 0–0.2)
BILIRUB SERPL-MCNC: 0.52 MG/DL (ref 0.3–1.2)
BILIRUBIN URINE: NEGATIVE
BUN BLDV-MCNC: 5 MG/DL (ref 6–20)
BUN/CREAT BLD: ABNORMAL (ref 9–20)
CALCIUM SERPL-MCNC: 8.8 MG/DL (ref 8.6–10.4)
CASTS UA: ABNORMAL /LPF (ref 0–8)
CHLORIDE BLD-SCNC: 102 MMOL/L (ref 98–107)
CO2: 20 MMOL/L (ref 20–31)
COLOR: YELLOW
COMMENT UA: ABNORMAL
CREAT SERPL-MCNC: 0.58 MG/DL (ref 0.5–0.9)
CREATININE URINE: 51.2 MG/DL (ref 28–217)
CRYSTALS, UA: ABNORMAL /HPF
DIFFERENTIAL TYPE: ABNORMAL
EOSINOPHILS RELATIVE PERCENT: 0 % (ref 1–4)
EPITHELIAL CELLS UA: ABNORMAL /HPF (ref 0–5)
GFR AFRICAN AMERICAN: >60 ML/MIN
GFR NON-AFRICAN AMERICAN: >60 ML/MIN
GFR SERPL CREATININE-BSD FRML MDRD: ABNORMAL ML/MIN/{1.73_M2}
GFR SERPL CREATININE-BSD FRML MDRD: ABNORMAL ML/MIN/{1.73_M2}
GLUCOSE BLD-MCNC: 116 MG/DL (ref 70–99)
GLUCOSE URINE: NEGATIVE
HCT VFR BLD CALC: 33.7 % (ref 36.3–47.1)
HEMOGLOBIN: 10.7 G/DL (ref 11.9–15.1)
IMMATURE GRANULOCYTES: 0 %
KETONES, URINE: ABNORMAL
LEUKOCYTE ESTERASE, URINE: ABNORMAL
LYMPHOCYTES # BLD: 7 % (ref 24–44)
MCH RBC QN AUTO: 27.8 PG (ref 25.2–33.5)
MCHC RBC AUTO-ENTMCNC: 31.8 G/DL (ref 28.4–34.8)
MCV RBC AUTO: 87.5 FL (ref 82.6–102.9)
MONOCYTES # BLD: 7 % (ref 1–7)
MORPHOLOGY: NORMAL
MUCUS: ABNORMAL
NITRITE, URINE: NEGATIVE
NRBC AUTOMATED: 0 PER 100 WBC
OTHER OBSERVATIONS UA: ABNORMAL
PDW BLD-RTO: 12.9 % (ref 11.8–14.4)
PH UA: 6 (ref 5–8)
PLATELET # BLD: 153 K/UL (ref 138–453)
PLATELET ESTIMATE: ABNORMAL
PMV BLD AUTO: 9.6 FL (ref 8.1–13.5)
POTASSIUM SERPL-SCNC: 3.9 MMOL/L (ref 3.7–5.3)
PROTEIN UA: NEGATIVE
RBC # BLD: 3.85 M/UL (ref 3.95–5.11)
RBC # BLD: ABNORMAL 10*6/UL
RBC UA: ABNORMAL /HPF (ref 0–4)
RENAL EPITHELIAL, UA: ABNORMAL /HPF
SEG NEUTROPHILS: 86 % (ref 36–66)
SEGMENTED NEUTROPHILS ABSOLUTE COUNT: 5.34 K/UL (ref 1.8–7.7)
SODIUM BLD-SCNC: 129 MMOL/L (ref 135–144)
SPECIFIC GRAVITY UA: 1.01 (ref 1–1.03)
TOTAL PROTEIN, URINE: 6 MG/DL
TOTAL PROTEIN: 5.8 G/DL (ref 6.4–8.3)
TRICHOMONAS: ABNORMAL
TURBIDITY: CLEAR
URINE HGB: NEGATIVE
URINE TOTAL PROTEIN CREATININE RATIO: 0.12 (ref 0–0.2)
UROBILINOGEN, URINE: NORMAL
WBC # BLD: 6.2 K/UL (ref 3.5–11.3)
WBC # BLD: ABNORMAL 10*3/UL
WBC UA: ABNORMAL /HPF (ref 0–5)
YEAST: ABNORMAL

## 2021-09-06 PROCEDURE — 82570 ASSAY OF URINE CREATININE: CPT

## 2021-09-06 PROCEDURE — 6370000000 HC RX 637 (ALT 250 FOR IP): Performed by: STUDENT IN AN ORGANIZED HEALTH CARE EDUCATION/TRAINING PROGRAM

## 2021-09-06 PROCEDURE — 96360 HYDRATION IV INFUSION INIT: CPT

## 2021-09-06 PROCEDURE — 85025 COMPLETE CBC W/AUTO DIFF WBC: CPT

## 2021-09-06 PROCEDURE — 2580000003 HC RX 258: Performed by: STUDENT IN AN ORGANIZED HEALTH CARE EDUCATION/TRAINING PROGRAM

## 2021-09-06 PROCEDURE — 36415 COLL VENOUS BLD VENIPUNCTURE: CPT

## 2021-09-06 PROCEDURE — 81001 URINALYSIS AUTO W/SCOPE: CPT

## 2021-09-06 PROCEDURE — 80053 COMPREHEN METABOLIC PANEL: CPT

## 2021-09-06 PROCEDURE — 84156 ASSAY OF PROTEIN URINE: CPT

## 2021-09-06 PROCEDURE — 99213 OFFICE O/P EST LOW 20 MIN: CPT

## 2021-09-06 RX ORDER — BUTALBITAL, ACETAMINOPHEN AND CAFFEINE 50; 325; 40 MG/1; MG/1; MG/1
1 TABLET ORAL EVERY 4 HOURS PRN
Status: DISCONTINUED | OUTPATIENT
Start: 2021-09-06 | End: 2021-09-06 | Stop reason: HOSPADM

## 2021-09-06 RX ORDER — ONDANSETRON 4 MG/1
4 TABLET, ORALLY DISINTEGRATING ORAL EVERY 8 HOURS PRN
Status: DISCONTINUED | OUTPATIENT
Start: 2021-09-06 | End: 2021-09-06 | Stop reason: HOSPADM

## 2021-09-06 RX ORDER — ACETAMINOPHEN 500 MG
1000 TABLET ORAL EVERY 4 HOURS PRN
Status: DISCONTINUED | OUTPATIENT
Start: 2021-09-06 | End: 2021-09-06 | Stop reason: HOSPADM

## 2021-09-06 RX ORDER — ACETAMINOPHEN 325 MG/1
650 TABLET ORAL EVERY 4 HOURS PRN
Status: DISCONTINUED | OUTPATIENT
Start: 2021-09-06 | End: 2021-09-06

## 2021-09-06 RX ORDER — 0.9 % SODIUM CHLORIDE 0.9 %
500 INTRAVENOUS SOLUTION INTRAVENOUS ONCE
Status: COMPLETED | OUTPATIENT
Start: 2021-09-06 | End: 2021-09-06

## 2021-09-06 RX ORDER — ONDANSETRON 2 MG/ML
4 INJECTION INTRAMUSCULAR; INTRAVENOUS EVERY 6 HOURS PRN
Status: DISCONTINUED | OUTPATIENT
Start: 2021-09-06 | End: 2021-09-06 | Stop reason: HOSPADM

## 2021-09-06 RX ADMIN — BUTALBITAL, ACETAMINOPHEN AND CAFFEINE 1 TABLET: 50; 325; 40 TABLET ORAL at 18:47

## 2021-09-06 RX ADMIN — MAGNESIUM GLUCONATE 500 MG ORAL TABLET 400 MG: 500 TABLET ORAL at 15:47

## 2021-09-06 RX ADMIN — SODIUM CHLORIDE 500 ML: 9 INJECTION, SOLUTION INTRAVENOUS at 18:29

## 2021-09-06 ASSESSMENT — PAIN SCALES - GENERAL: PAINLEVEL_OUTOF10: 8

## 2021-09-06 NOTE — FLOWSHEET NOTE
Arrives to unit with co entire head aching since yesterday am, has taken flexeril at 1000, slept about 1 hour, now headache back, rates it an 8
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
## 2021-09-07 NOTE — FLOWSHEET NOTE
Discharge instructions gone over with patient and support person. Patient walking oiut of unit 2035.

## 2021-09-07 NOTE — PROGRESS NOTES
Obstetric/Gynecology Resident Interval Note    Feeling beter after fluids, reports headache is improved. Patient has POTs and is concerned about resting heart rate, disucussed with patient that it has returned to baseline after fluids. Patient state she has a cardiology appointment on Friday. Dr. Kacie Johnson in room to dicusss plan with patient. Will discharge home for scheduled follow up.     FHT Baseline 150 w/ moderate variability w/ accelerations no decelerations TOCO quiet     Israel Ordoñez DO  OB/GYN Resident, PGY3  [facility]  9/6/2021, 8:24 PM

## 2021-09-08 ENCOUNTER — ROUTINE PRENATAL (OUTPATIENT)
Dept: OBGYN CLINIC | Age: 31
End: 2021-09-08
Payer: MEDICARE

## 2021-09-08 VITALS
DIASTOLIC BLOOD PRESSURE: 84 MMHG | BODY MASS INDEX: 34.29 KG/M2 | SYSTOLIC BLOOD PRESSURE: 124 MMHG | HEART RATE: 96 BPM | WEIGHT: 239 LBS

## 2021-09-08 DIAGNOSIS — Z3A.31 31 WEEKS GESTATION OF PREGNANCY: Primary | ICD-10-CM

## 2021-09-08 DIAGNOSIS — Z82.79 FAMILY HISTORY OF CONGENITAL HEART DEFECT: ICD-10-CM

## 2021-09-08 DIAGNOSIS — Z98.890 HISTORY OF BACK SURGERY: ICD-10-CM

## 2021-09-08 DIAGNOSIS — O34.219 VBAC (VAGINAL BIRTH AFTER CESAREAN): ICD-10-CM

## 2021-09-08 DIAGNOSIS — E03.9 HYPOTHYROIDISM, UNSPECIFIED TYPE: ICD-10-CM

## 2021-09-08 DIAGNOSIS — Z83.3 FAMILY HISTORY OF DIABETES MELLITUS IN FATHER: ICD-10-CM

## 2021-09-08 DIAGNOSIS — O44.40 LOW-LYING PLACENTA: ICD-10-CM

## 2021-09-08 DIAGNOSIS — Z98.891 H/O: C-SECTION: ICD-10-CM

## 2021-09-08 DIAGNOSIS — G90.A POTS (POSTURAL ORTHOSTATIC TACHYCARDIA SYNDROME): ICD-10-CM

## 2021-09-08 DIAGNOSIS — R73.09 ABNORMAL GLUCOSE TOLERANCE TEST (GTT): ICD-10-CM

## 2021-09-08 DIAGNOSIS — Z86.69 HISTORY OF CHIARI MALFORMATION: ICD-10-CM

## 2021-09-08 DIAGNOSIS — O24.415 GESTATIONAL DIABETES MELLITUS (GDM) IN SECOND TRIMESTER CONTROLLED ON ORAL HYPOGLYCEMIC DRUG: ICD-10-CM

## 2021-09-08 DIAGNOSIS — G96.191 TARLOV CYSTS: ICD-10-CM

## 2021-09-08 DIAGNOSIS — Z82.79 FAMILY HISTORY OF DOWN SYNDROME: ICD-10-CM

## 2021-09-08 PROCEDURE — G8427 DOCREV CUR MEDS BY ELIG CLIN: HCPCS | Performed by: NURSE PRACTITIONER

## 2021-09-08 PROCEDURE — G8417 CALC BMI ABV UP PARAM F/U: HCPCS | Performed by: NURSE PRACTITIONER

## 2021-09-08 PROCEDURE — 1036F TOBACCO NON-USER: CPT | Performed by: NURSE PRACTITIONER

## 2021-09-08 PROCEDURE — 99213 OFFICE O/P EST LOW 20 MIN: CPT | Performed by: NURSE PRACTITIONER

## 2021-09-08 RX ORDER — FERROUS SULFATE 325(65) MG
325 TABLET ORAL 2 TIMES DAILY
Qty: 180 TABLET | Refills: 1 | Status: SHIPPED | OUTPATIENT
Start: 2021-09-08 | End: 2022-02-07 | Stop reason: ALTCHOICE
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
## 2021-09-10 ENCOUNTER — PATIENT MESSAGE (OUTPATIENT)
Dept: OBGYN CLINIC | Age: 31
End: 2021-09-10

## 2021-09-10 NOTE — TELEPHONE ENCOUNTER
Patient was notified per Demetria Membreno she needs to follow up with physician, and patient should get a copy of her cardiology notes from her most recent appointment to be reviewed at that time. Patient scheduled for appointment this upcoming Tuesday 9/14 with . patient is calling her cardiologist to have them fax us their notes to review.

## 2021-09-14 ENCOUNTER — ROUTINE PRENATAL (OUTPATIENT)
Dept: OBGYN CLINIC | Age: 31
End: 2021-09-14
Payer: MEDICARE

## 2021-09-14 VITALS
WEIGHT: 235 LBS | BODY MASS INDEX: 33.72 KG/M2 | DIASTOLIC BLOOD PRESSURE: 68 MMHG | HEART RATE: 100 BPM | SYSTOLIC BLOOD PRESSURE: 108 MMHG

## 2021-09-14 DIAGNOSIS — G96.191 TARLOV CYSTS: ICD-10-CM

## 2021-09-14 DIAGNOSIS — O09.93 HIGH-RISK PREGNANCY IN THIRD TRIMESTER: Primary | ICD-10-CM

## 2021-09-14 DIAGNOSIS — Z82.79 FAMILY HISTORY OF DOWN SYNDROME: ICD-10-CM

## 2021-09-14 DIAGNOSIS — Z82.79 FAMILY HISTORY OF CONGENITAL HEART DEFECT: ICD-10-CM

## 2021-09-14 DIAGNOSIS — O44.40 LOW-LYING PLACENTA: ICD-10-CM

## 2021-09-14 DIAGNOSIS — R73.09 ABNORMAL GLUCOSE TOLERANCE TEST (GTT): ICD-10-CM

## 2021-09-14 DIAGNOSIS — G90.A POTS (POSTURAL ORTHOSTATIC TACHYCARDIA SYNDROME): ICD-10-CM

## 2021-09-14 DIAGNOSIS — Z98.891 H/O: C-SECTION: ICD-10-CM

## 2021-09-14 DIAGNOSIS — O24.419 GESTATIONAL DIABETES MELLITUS (GDM), ANTEPARTUM, GESTATIONAL DIABETES METHOD OF CONTROL UNSPECIFIED: ICD-10-CM

## 2021-09-14 DIAGNOSIS — Z98.890 HISTORY OF BACK SURGERY: ICD-10-CM

## 2021-09-14 DIAGNOSIS — Z86.69 HISTORY OF CHIARI MALFORMATION: ICD-10-CM

## 2021-09-14 DIAGNOSIS — Z3A.32 32 WEEKS GESTATION OF PREGNANCY: ICD-10-CM

## 2021-09-14 DIAGNOSIS — Z83.3 FAMILY HISTORY OF DIABETES MELLITUS IN FATHER: ICD-10-CM

## 2021-09-14 DIAGNOSIS — O34.219 VBAC (VAGINAL BIRTH AFTER CESAREAN): ICD-10-CM

## 2021-09-14 PROCEDURE — 1036F TOBACCO NON-USER: CPT | Performed by: OBSTETRICS & GYNECOLOGY

## 2021-09-14 PROCEDURE — G8417 CALC BMI ABV UP PARAM F/U: HCPCS | Performed by: OBSTETRICS & GYNECOLOGY

## 2021-09-14 PROCEDURE — G8427 DOCREV CUR MEDS BY ELIG CLIN: HCPCS | Performed by: OBSTETRICS & GYNECOLOGY

## 2021-09-14 PROCEDURE — 99214 OFFICE O/P EST MOD 30 MIN: CPT | Performed by: OBSTETRICS & GYNECOLOGY
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
## 2021-09-19 ENCOUNTER — PATIENT MESSAGE (OUTPATIENT)
Dept: OBGYN CLINIC | Age: 31
End: 2021-09-19

## 2021-09-19 DIAGNOSIS — L29.9 ITCHING: Primary | ICD-10-CM

## 2021-09-20 ENCOUNTER — HOSPITAL ENCOUNTER (OUTPATIENT)
Age: 31
Discharge: HOME | End: 2021-09-20
Payer: MEDICARE

## 2021-09-20 ENCOUNTER — TELEPHONE (OUTPATIENT)
Dept: OBGYN CLINIC | Age: 31
End: 2021-09-20

## 2021-09-20 DIAGNOSIS — L29.9 ITCHING: ICD-10-CM

## 2021-09-20 DIAGNOSIS — L29.9 PRURITUS OF PREGNANCY IN THIRD TRIMESTER: Primary | ICD-10-CM

## 2021-09-20 DIAGNOSIS — R74.8 ELEVATED LIVER ENZYMES: ICD-10-CM

## 2021-09-20 DIAGNOSIS — O99.713 PRURITUS OF PREGNANCY IN THIRD TRIMESTER: Primary | ICD-10-CM

## 2021-09-20 LAB
ALT SERPL-CCNC: 113 U/L (ref 5–33)
AST SERPL-CCNC: 43 U/L

## 2021-09-20 PROCEDURE — 84450 TRANSFERASE (AST) (SGOT): CPT

## 2021-09-20 PROCEDURE — 36415 COLL VENOUS BLD VENIPUNCTURE: CPT

## 2021-09-20 PROCEDURE — 82239 BILE ACIDS TOTAL: CPT

## 2021-09-20 PROCEDURE — 84460 ALANINE AMINO (ALT) (SGPT): CPT

## 2021-09-20 RX ORDER — URSODIOL 300 MG/1
300 CAPSULE ORAL 2 TIMES DAILY
Qty: 60 CAPSULE | Refills: 2 | Status: ON HOLD | OUTPATIENT
Start: 2021-09-20 | End: 2021-10-11 | Stop reason: HOSPADM

## 2021-09-20 NOTE — TELEPHONE ENCOUNTER
Patient notified of elevated liver enzymes. Bile salts pending. Lab indicates may not get results back till Tuesday or Wednesday- send out. DR Loraine Barreto notified of generalized itching and elevated liver enzymes. Recommend consult MFM. To start on ursodiol while bile salts pending- probable cholestasis of pregnancy. Patient notified of above and script to be sent. Patient reports fetal movement but not as active as before. Instructed to go to [facility] for evaluation today. Denies headache, blurred vision, epigastric pain. Patient reports checks blood pressure every day and today it was normal- not elevated. Also instructed on S/S of preeclampsia. Patient has MFM appointment weekly- scheduled 9/22/2021.

## 2021-09-20 NOTE — TELEPHONE ENCOUNTER
From: Estrella Monge  To: Mercedez Byrne DO  Sent: 9/19/2021 11:25 AM EDT  Subject: Non-Urgent Medical Question    Good morning,     For the past week I've dealt with some itchiness. No rashes, no new soaps or irritants. .. just itching. It gets worse at night and kept me up a great deal last night. Feet, hands, legs, arms, back, stomach and head. .. pretty much everywhere. But feet,legs and hands are the greatest annoyance. Please advise on what can be done. .. Im wondering if my liver should be checked?

## 2021-09-21 DIAGNOSIS — R74.8 ELEVATED LIVER ENZYMES: Primary | ICD-10-CM

## 2021-09-22 ENCOUNTER — TELEPHONE (OUTPATIENT)
Dept: OBGYN CLINIC | Age: 31
End: 2021-09-22

## 2021-09-22 ENCOUNTER — ROUTINE PRENATAL (OUTPATIENT)
Dept: PERINATAL CARE | Age: 31
End: 2021-09-22
Payer: MEDICARE

## 2021-09-22 VITALS
SYSTOLIC BLOOD PRESSURE: 115 MMHG | DIASTOLIC BLOOD PRESSURE: 77 MMHG | HEART RATE: 93 BPM | BODY MASS INDEX: 34.36 KG/M2 | HEIGHT: 70 IN | RESPIRATION RATE: 18 BRPM | WEIGHT: 240 LBS | TEMPERATURE: 97.2 F

## 2021-09-22 DIAGNOSIS — Z3A.33 33 WEEKS GESTATION OF PREGNANCY: ICD-10-CM

## 2021-09-22 DIAGNOSIS — E03.9 HYPOTHYROIDISM AFFECTING PREGNANCY IN THIRD TRIMESTER: ICD-10-CM

## 2021-09-22 DIAGNOSIS — O24.119 PRE-EXISTING TYPE 2 DIABETES MELLITUS DURING PREGNANCY, ANTEPARTUM: ICD-10-CM

## 2021-09-22 DIAGNOSIS — Z03.72 SUSPECTED PLACENTAL PROBLEM NOT FOUND: ICD-10-CM

## 2021-09-22 DIAGNOSIS — O16.3 HYPERTENSION AFFECTING PREGNANCY IN THIRD TRIMESTER: Primary | ICD-10-CM

## 2021-09-22 DIAGNOSIS — O99.283 HYPOTHYROIDISM AFFECTING PREGNANCY IN THIRD TRIMESTER: ICD-10-CM

## 2021-09-22 DIAGNOSIS — O99.891 CURRENT MATERNAL CONDITION AFFECTING PREGNANCY: ICD-10-CM

## 2021-09-22 DIAGNOSIS — O26.613 LIVER DISORDER DURING PREGNANCY IN THIRD TRIMESTER: ICD-10-CM

## 2021-09-22 DIAGNOSIS — Z36.4 ANTENATAL SCREENING FOR FETAL GROWTH RETARDATION USING ULTRASONICS: ICD-10-CM

## 2021-09-22 DIAGNOSIS — O44.40 LOW IMPLANTATION OF PLACENTA WITHOUT HEMORRHAGE: ICD-10-CM

## 2021-09-22 PROBLEM — K83.1 CHOLESTASIS: Status: ACTIVE | Noted: 2021-09-22

## 2021-09-22 LAB
ABDOMINAL CIRCUMFERENCE: NORMAL
ABDOMINAL CIRCUMFERENCE: NORMAL
BILE ACIDS TOTAL: 25 UMOL/L (ref 0–10)
BIPARIETAL DIAMETER: NORMAL
BIPARIETAL DIAMETER: NORMAL
ESTIMATED FETAL WEIGHT: NORMAL
ESTIMATED FETAL WEIGHT: NORMAL
FEMORAL DIAMETER: NORMAL
FEMORAL DIAMETER: NORMAL
HC/AC: NORMAL
HC/AC: NORMAL
HEAD CIRCUMFERENCE: NORMAL
HEAD CIRCUMFERENCE: NORMAL

## 2021-09-22 PROCEDURE — 76818 FETAL BIOPHYS PROFILE W/NST: CPT | Performed by: OBSTETRICS & GYNECOLOGY

## 2021-09-22 PROCEDURE — 76816 OB US FOLLOW-UP PER FETUS: CPT | Performed by: OBSTETRICS & GYNECOLOGY

## 2021-09-22 PROCEDURE — 76820 UMBILICAL ARTERY ECHO: CPT | Performed by: OBSTETRICS & GYNECOLOGY

## 2021-09-22 PROCEDURE — 76817 TRANSVAGINAL US OBSTETRIC: CPT | Performed by: OBSTETRICS & GYNECOLOGY

## 2021-09-22 NOTE — TELEPHONE ENCOUNTER
----- Message from DEAN Cazares CNP sent at 9/22/2021  8:11 AM EDT -----  Can you please call and see if the bile acid level is back?    It was drawn on Monday

## 2021-09-22 NOTE — TELEPHONE ENCOUNTER
Spoke with patient in regards to her test results, patient states she was at [facility] this morning but they did not have time to consult her on elevated liver enzymes so she does have another appointment scheduled for that. Encouraged patient to keep her appointment which is scheduled for 9/28/21. Patient voiced understanding.

## 2021-09-23 DIAGNOSIS — K83.1 CHOLESTASIS: Primary | ICD-10-CM

## 2021-09-24 ENCOUNTER — ROUTINE PRENATAL (OUTPATIENT)
Dept: OBGYN CLINIC | Age: 31
End: 2021-09-24
Payer: MEDICARE

## 2021-09-24 ENCOUNTER — HOSPITAL ENCOUNTER (OUTPATIENT)
Age: 31
Discharge: HOME | End: 2021-09-24
Attending: OBSTETRICS & GYNECOLOGY | Admitting: OBSTETRICS & GYNECOLOGY
Payer: MEDICARE

## 2021-09-24 VITALS
SYSTOLIC BLOOD PRESSURE: 118 MMHG | TEMPERATURE: 98 F | RESPIRATION RATE: 18 BRPM | HEART RATE: 92 BPM | DIASTOLIC BLOOD PRESSURE: 81 MMHG

## 2021-09-24 VITALS
DIASTOLIC BLOOD PRESSURE: 78 MMHG | WEIGHT: 239 LBS | HEART RATE: 87 BPM | SYSTOLIC BLOOD PRESSURE: 118 MMHG | BODY MASS INDEX: 34.29 KG/M2

## 2021-09-24 DIAGNOSIS — O34.219 VBAC (VAGINAL BIRTH AFTER CESAREAN): ICD-10-CM

## 2021-09-24 DIAGNOSIS — Z82.79 FAMILY HISTORY OF CONGENITAL HEART DEFECT: ICD-10-CM

## 2021-09-24 DIAGNOSIS — R73.09 ABNORMAL GLUCOSE TOLERANCE TEST (GTT): ICD-10-CM

## 2021-09-24 DIAGNOSIS — G96.191 TARLOV CYSTS: ICD-10-CM

## 2021-09-24 DIAGNOSIS — Z82.79 FAMILY HISTORY OF DOWN SYNDROME: ICD-10-CM

## 2021-09-24 DIAGNOSIS — Z98.890 HISTORY OF BACK SURGERY: ICD-10-CM

## 2021-09-24 DIAGNOSIS — O24.415 GESTATIONAL DIABETES MELLITUS (GDM) IN SECOND TRIMESTER CONTROLLED ON ORAL HYPOGLYCEMIC DRUG: ICD-10-CM

## 2021-09-24 DIAGNOSIS — Z3A.33 33 WEEKS GESTATION OF PREGNANCY: ICD-10-CM

## 2021-09-24 DIAGNOSIS — O44.40 LOW-LYING PLACENTA: ICD-10-CM

## 2021-09-24 DIAGNOSIS — Z83.3 FAMILY HISTORY OF DIABETES MELLITUS IN FATHER: ICD-10-CM

## 2021-09-24 DIAGNOSIS — Z86.69 HISTORY OF CHIARI MALFORMATION: ICD-10-CM

## 2021-09-24 DIAGNOSIS — O09.93 HRP (HIGH RISK PREGNANCY), THIRD TRIMESTER: Primary | ICD-10-CM

## 2021-09-24 DIAGNOSIS — E03.9 HYPOTHYROIDISM, UNSPECIFIED TYPE: ICD-10-CM

## 2021-09-24 DIAGNOSIS — G90.A POTS (POSTURAL ORTHOSTATIC TACHYCARDIA SYNDROME): ICD-10-CM

## 2021-09-24 DIAGNOSIS — R74.8 ELEVATED LIVER ENZYMES: ICD-10-CM

## 2021-09-24 DIAGNOSIS — K83.1 CHOLESTASIS: ICD-10-CM

## 2021-09-24 DIAGNOSIS — Z98.891 H/O: C-SECTION: ICD-10-CM

## 2021-09-24 PROBLEM — O26.619 CHOLESTASIS DURING PREGNANCY, ANTEPARTUM: Status: ACTIVE | Noted: 2021-09-22

## 2021-09-24 PROCEDURE — 1036F TOBACCO NON-USER: CPT | Performed by: OBSTETRICS & GYNECOLOGY

## 2021-09-24 PROCEDURE — G8427 DOCREV CUR MEDS BY ELIG CLIN: HCPCS | Performed by: OBSTETRICS & GYNECOLOGY

## 2021-09-24 PROCEDURE — G8417 CALC BMI ABV UP PARAM F/U: HCPCS | Performed by: OBSTETRICS & GYNECOLOGY

## 2021-09-24 PROCEDURE — 99213 OFFICE O/P EST LOW 20 MIN: CPT

## 2021-09-24 PROCEDURE — 76818 FETAL BIOPHYS PROFILE W/NST: CPT

## 2021-09-24 PROCEDURE — 99214 OFFICE O/P EST MOD 30 MIN: CPT | Performed by: OBSTETRICS & GYNECOLOGY

## 2021-09-24 PROCEDURE — 59025 FETAL NON-STRESS TEST: CPT | Performed by: OBSTETRICS & GYNECOLOGY

## 2021-09-24 RX ORDER — ONDANSETRON 4 MG/1
4 TABLET, ORALLY DISINTEGRATING ORAL EVERY 8 HOURS PRN
Status: DISCONTINUED | OUTPATIENT
Start: 2021-09-24 | End: 2021-09-24 | Stop reason: HOSPADM

## 2021-09-24 RX ORDER — ONDANSETRON 2 MG/ML
4 INJECTION INTRAMUSCULAR; INTRAVENOUS EVERY 6 HOURS PRN
Status: DISCONTINUED | OUTPATIENT
Start: 2021-09-24 | End: 2021-09-24 | Stop reason: HOSPADM

## 2021-09-24 RX ORDER — ACETAMINOPHEN 500 MG
1000 TABLET ORAL EVERY 6 HOURS PRN
Status: DISCONTINUED | OUTPATIENT
Start: 2021-09-24 | End: 2021-09-24 | Stop reason: HOSPADM
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
## 2021-09-24 NOTE — PROGRESS NOTES
TESTING NOTE    Stacy Cervantes is a 27 y.o. female [id number] at 33w5d    The patient was seen and examined. She is here today for  testing after a non reactive NST in office today. The baby is moving well and she denies any complaints. Denies contractions or leakage of fluid. Endorses fetal movement. Her pregnancy is complicated by intrahepatic cholestasis, POTS, maternal Chiari malformation, Hx CS x1, GDMA1.      Patient Active Problem List   Diagnosis    Reactive airway disease    Allergic sinusitis    Psoriasis    Neck pain    Hypothyroidism    Chronic fatigue    Seasonal allergic rhinitis due to pollen    Anti-RNP antibodies present    Positive TEO (antinuclear antibody)    Lumbar disc herniation    Lumbar radiculopathy    Prediabetes    Lumbar spondylosis    Spondylolisthesis at L4-L5 level    Right ovarian cyst    Class 2 obesity due to excess calories without serious comorbidity with body mass index (BMI) of 35.0 to 35.9 in adult    Postauricular adenopathy    Sacroiliitis, not elsewhere classified (HCC)    Dizziness    POTS (postural orthostatic tachycardia syndrome)    CSF leak    Cyst of spinal meninges    Double vision    Hemicrania continua    H/O:       (vaginal birth after ) X 2 ( and )    Family history of congenital heart defect    Family history of Down syndrome    Family history of diabetes mellitus in father    History of Chiari malformation    History of back surgery    Tarlov cysts    Abnormal glucose tolerance test (GTT) FAILED 3 hr GTT A1GDM    cHTN    Low-lying placenta    Gestational diabetes    Elevated liver enzymes, bile salts pending 2021    Cholestasis during pregnancy, antepartum    33 weeks gestation of pregnancy       Vitals:  Vitals:    21 1037   BP: 118/81   Pulse: 92   Resp: 18   Temp: 98 °F (36.7 °C)         NST:   Fetal heart rate baseline: 145, moderate variability, accelerations

## 2021-09-24 NOTE — ASSESSMENT & PLAN NOTE
Monitored by specialist- no acute findings meriting change in the plan   ON MEDS.  RECOMMEND DELIVERY 36-38 6/7 WEEKS  PLAN REPEAT  39 WEEKS UNLESS FURTHER RECOMMENDATIONS BY MFM  C/W WEEKLY ANANYAM

## 2021-09-27 ENCOUNTER — TELEPHONE (OUTPATIENT)
Dept: OBGYN CLINIC | Age: 31
End: 2021-09-27

## 2021-09-27 NOTE — TELEPHONE ENCOUNTER
Pt called and has decided she wants to move forward with permanent sterilization. **Pt has appt with Dr Guadarrama on 10/5 but was instructed to call and inform once she decided.

## 2021-09-28 ENCOUNTER — ROUTINE PRENATAL (OUTPATIENT)
Dept: PERINATAL CARE | Age: 31
End: 2021-09-28
Payer: MEDICARE

## 2021-09-28 VITALS
HEIGHT: 70 IN | DIASTOLIC BLOOD PRESSURE: 75 MMHG | SYSTOLIC BLOOD PRESSURE: 114 MMHG | HEART RATE: 93 BPM | RESPIRATION RATE: 20 BRPM | WEIGHT: 241 LBS | BODY MASS INDEX: 34.5 KG/M2 | TEMPERATURE: 97.1 F

## 2021-09-28 DIAGNOSIS — O16.3 HYPERTENSION AFFECTING PREGNANCY IN THIRD TRIMESTER: ICD-10-CM

## 2021-09-28 DIAGNOSIS — K83.1 INTRAHEPATIC CHOLESTASIS OF PREGNANCY IN THIRD TRIMESTER: Primary | ICD-10-CM

## 2021-09-28 DIAGNOSIS — O24.119 PRE-EXISTING TYPE 2 DIABETES MELLITUS DURING PREGNANCY, ANTEPARTUM: ICD-10-CM

## 2021-09-28 DIAGNOSIS — O99.283 HYPOTHYROIDISM AFFECTING PREGNANCY IN THIRD TRIMESTER: ICD-10-CM

## 2021-09-28 DIAGNOSIS — Z3A.34 34 WEEKS GESTATION OF PREGNANCY: ICD-10-CM

## 2021-09-28 DIAGNOSIS — O26.613 INTRAHEPATIC CHOLESTASIS OF PREGNANCY IN THIRD TRIMESTER: Primary | ICD-10-CM

## 2021-09-28 DIAGNOSIS — O99.891 CURRENT MATERNAL CONDITION AFFECTING PREGNANCY: ICD-10-CM

## 2021-09-28 DIAGNOSIS — E03.9 HYPOTHYROIDISM AFFECTING PREGNANCY IN THIRD TRIMESTER: ICD-10-CM

## 2021-09-28 PROBLEM — O40.9XX0 POLYHYDRAMNIOS, ANTEPARTUM: Status: ACTIVE | Noted: 2021-09-28

## 2021-09-28 PROCEDURE — 99243 OFF/OP CNSLTJ NEW/EST LOW 30: CPT | Performed by: OBSTETRICS & GYNECOLOGY

## 2021-09-28 PROCEDURE — 76820 UMBILICAL ARTERY ECHO: CPT | Performed by: OBSTETRICS & GYNECOLOGY

## 2021-09-28 PROCEDURE — G8417 CALC BMI ABV UP PARAM F/U: HCPCS | Performed by: OBSTETRICS & GYNECOLOGY

## 2021-09-28 PROCEDURE — 76818 FETAL BIOPHYS PROFILE W/NST: CPT | Performed by: OBSTETRICS & GYNECOLOGY

## 2021-09-28 PROCEDURE — 76825 ECHO EXAM OF FETAL HEART: CPT | Performed by: OBSTETRICS & GYNECOLOGY

## 2021-09-28 PROCEDURE — 93325 DOPPLER ECHO COLOR FLOW MAPG: CPT | Performed by: OBSTETRICS & GYNECOLOGY

## 2021-09-28 PROCEDURE — G8427 DOCREV CUR MEDS BY ELIG CLIN: HCPCS | Performed by: OBSTETRICS & GYNECOLOGY

## 2021-09-28 PROCEDURE — 76827 ECHO EXAM OF FETAL HEART: CPT | Performed by: OBSTETRICS & GYNECOLOGY

## 2021-09-28 PROCEDURE — 2022F DILAT RTA XM EVC RTNOPTHY: CPT | Performed by: OBSTETRICS & GYNECOLOGY

## 2021-09-28 NOTE — PROGRESS NOTES
Please refer to attached ultrasound report for doctor's evaluation of the clinical information obtained by vital signs, ultrasound, and/or non-stress test along with management recommendation. Patient forgot blood sugar log today.

## 2021-09-29 PROBLEM — O40.9XX0 POLYHYDRAMNIOS: Status: ACTIVE | Noted: 2021-09-29

## 2021-10-05 ENCOUNTER — ROUTINE PRENATAL (OUTPATIENT)
Dept: OBGYN CLINIC | Age: 31
End: 2021-10-05
Payer: MEDICARE

## 2021-10-05 VITALS
DIASTOLIC BLOOD PRESSURE: 68 MMHG | HEART RATE: 95 BPM | WEIGHT: 242 LBS | SYSTOLIC BLOOD PRESSURE: 116 MMHG | BODY MASS INDEX: 34.72 KG/M2

## 2021-10-05 DIAGNOSIS — Z82.79 FAMILY HISTORY OF CONGENITAL HEART DEFECT: ICD-10-CM

## 2021-10-05 DIAGNOSIS — Z83.3 FAMILY HISTORY OF DIABETES MELLITUS IN FATHER: ICD-10-CM

## 2021-10-05 DIAGNOSIS — Z98.891 H/O: C-SECTION: ICD-10-CM

## 2021-10-05 DIAGNOSIS — G96.191 TARLOV CYSTS: ICD-10-CM

## 2021-10-05 DIAGNOSIS — G90.A POTS (POSTURAL ORTHOSTATIC TACHYCARDIA SYNDROME): ICD-10-CM

## 2021-10-05 DIAGNOSIS — R74.8 ELEVATED LIVER ENZYMES: ICD-10-CM

## 2021-10-05 DIAGNOSIS — Z82.79 FAMILY HISTORY OF DOWN SYNDROME: ICD-10-CM

## 2021-10-05 DIAGNOSIS — O40.9XX1 POLYHYDRAMNIOS, ANTEPARTUM, FETUS 1 OF MULTIPLE GESTATION: ICD-10-CM

## 2021-10-05 DIAGNOSIS — K83.1 CHOLESTASIS DURING PREGNANCY, ANTEPARTUM: ICD-10-CM

## 2021-10-05 DIAGNOSIS — R73.09 ABNORMAL GLUCOSE TOLERANCE TEST (GTT): ICD-10-CM

## 2021-10-05 DIAGNOSIS — Z98.890 HISTORY OF BACK SURGERY: ICD-10-CM

## 2021-10-05 DIAGNOSIS — O26.619 CHOLESTASIS DURING PREGNANCY, ANTEPARTUM: ICD-10-CM

## 2021-10-05 DIAGNOSIS — O24.415 GESTATIONAL DIABETES MELLITUS (GDM) CONTROLLED ON ORAL HYPOGLYCEMIC DRUG, ANTEPARTUM: ICD-10-CM

## 2021-10-05 DIAGNOSIS — O44.40 LOW-LYING PLACENTA: ICD-10-CM

## 2021-10-05 DIAGNOSIS — Z3A.35 35 WEEKS GESTATION OF PREGNANCY: ICD-10-CM

## 2021-10-05 DIAGNOSIS — Z86.69 HISTORY OF CHIARI MALFORMATION: ICD-10-CM

## 2021-10-05 DIAGNOSIS — O09.93 HIGH-RISK PREGNANCY IN THIRD TRIMESTER: Primary | ICD-10-CM

## 2021-10-05 DIAGNOSIS — O34.219 VBAC (VAGINAL BIRTH AFTER CESAREAN): ICD-10-CM

## 2021-10-05 PROBLEM — O26.649 INTRAHEPATIC CHOLESTASIS OF PREGNANCY: Status: ACTIVE | Noted: 2021-09-22

## 2021-10-05 PROCEDURE — G8417 CALC BMI ABV UP PARAM F/U: HCPCS | Performed by: OBSTETRICS & GYNECOLOGY

## 2021-10-05 PROCEDURE — G8484 FLU IMMUNIZE NO ADMIN: HCPCS | Performed by: OBSTETRICS & GYNECOLOGY

## 2021-10-05 PROCEDURE — 1036F TOBACCO NON-USER: CPT | Performed by: OBSTETRICS & GYNECOLOGY

## 2021-10-05 PROCEDURE — G8427 DOCREV CUR MEDS BY ELIG CLIN: HCPCS | Performed by: OBSTETRICS & GYNECOLOGY

## 2021-10-05 PROCEDURE — 99213 OFFICE O/P EST LOW 20 MIN: CPT | Performed by: OBSTETRICS & GYNECOLOGY
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
## 2021-10-06 ENCOUNTER — ROUTINE PRENATAL (OUTPATIENT)
Dept: PERINATAL CARE | Age: 31
End: 2021-10-06
Payer: MEDICARE

## 2021-10-06 VITALS
DIASTOLIC BLOOD PRESSURE: 74 MMHG | WEIGHT: 244 LBS | SYSTOLIC BLOOD PRESSURE: 114 MMHG | RESPIRATION RATE: 18 BRPM | BODY MASS INDEX: 35.01 KG/M2 | HEART RATE: 113 BPM | TEMPERATURE: 97.5 F

## 2021-10-06 DIAGNOSIS — O16.3 HYPERTENSION AFFECTING PREGNANCY IN THIRD TRIMESTER: Primary | ICD-10-CM

## 2021-10-06 DIAGNOSIS — O40.9XX0 POLYHYDRAMNIOS, ANTEPARTUM, SINGLE OR UNSPECIFIED FETUS: ICD-10-CM

## 2021-10-06 DIAGNOSIS — K83.1 INTRAHEPATIC CHOLESTASIS OF PREGNANCY IN THIRD TRIMESTER: ICD-10-CM

## 2021-10-06 DIAGNOSIS — O26.613 INTRAHEPATIC CHOLESTASIS OF PREGNANCY IN THIRD TRIMESTER: ICD-10-CM

## 2021-10-06 DIAGNOSIS — E03.9 HYPOTHYROIDISM AFFECTING PREGNANCY IN THIRD TRIMESTER: ICD-10-CM

## 2021-10-06 DIAGNOSIS — Z3A.35 35 WEEKS GESTATION OF PREGNANCY: ICD-10-CM

## 2021-10-06 DIAGNOSIS — O24.119 PRE-EXISTING TYPE 2 DIABETES MELLITUS DURING PREGNANCY, ANTEPARTUM: ICD-10-CM

## 2021-10-06 DIAGNOSIS — O99.283 HYPOTHYROIDISM AFFECTING PREGNANCY IN THIRD TRIMESTER: ICD-10-CM

## 2021-10-06 DIAGNOSIS — O99.891 CURRENT MATERNAL CONDITION AFFECTING PREGNANCY: ICD-10-CM

## 2021-10-06 PROCEDURE — 76820 UMBILICAL ARTERY ECHO: CPT | Performed by: OBSTETRICS & GYNECOLOGY

## 2021-10-06 PROCEDURE — 76818 FETAL BIOPHYS PROFILE W/NST: CPT | Performed by: OBSTETRICS & GYNECOLOGY

## 2021-10-08 ENCOUNTER — HOSPITAL ENCOUNTER (OUTPATIENT)
Age: 31
Setting detail: SURGERY ADMIT
Discharge: HOME | DRG: 539 | End: 2021-10-08
Attending: OBSTETRICS & GYNECOLOGY | Admitting: OBSTETRICS & GYNECOLOGY
Payer: MEDICARE

## 2021-10-08 VITALS — SYSTOLIC BLOOD PRESSURE: 124 MMHG | HEART RATE: 88 BPM | RESPIRATION RATE: 18 BRPM | DIASTOLIC BLOOD PRESSURE: 78 MMHG

## 2021-10-08 PROCEDURE — 96372 THER/PROPH/DIAG INJ SC/IM: CPT

## 2021-10-08 PROCEDURE — 6360000002 HC RX W HCPCS: Performed by: STUDENT IN AN ORGANIZED HEALTH CARE EDUCATION/TRAINING PROGRAM

## 2021-10-08 RX ORDER — BETAMETHASONE SODIUM PHOSPHATE AND BETAMETHASONE ACETATE 3; 3 MG/ML; MG/ML
12 INJECTION, SUSPENSION INTRA-ARTICULAR; INTRALESIONAL; INTRAMUSCULAR; SOFT TISSUE ONCE
Status: COMPLETED | OUTPATIENT
Start: 2021-10-08 | End: 2021-10-08

## 2021-10-08 RX ADMIN — BETAMETHASONE SODIUM PHOSPHATE AND BETAMETHASONE ACETATE 12 MG: 3; 3 INJECTION, SUSPENSION INTRA-ARTICULAR; INTRALESIONAL; INTRAMUSCULAR at 09:33

## 2021-10-08 NOTE — PROGRESS NOTES
Discussed patient's back surgery in the past year. She reports having several CSF leaks which have been repaired. Had an epidural 11 years ago which did not work well for her. Patient is getting epidural steroid injection today. Given patient's pas history we have elected to perform general anesthesia for the C section scheduled 3 days from now. Procedure explained in detail, questions answered . She agrees to proceed with General anesthesia.

## 2021-10-09 ENCOUNTER — HOSPITAL ENCOUNTER (OUTPATIENT)
Age: 31
Discharge: HOME | DRG: 539 | End: 2021-10-09
Attending: OBSTETRICS & GYNECOLOGY | Admitting: OBSTETRICS & GYNECOLOGY
Payer: MEDICARE

## 2021-10-09 VITALS
HEART RATE: 91 BPM | SYSTOLIC BLOOD PRESSURE: 130 MMHG | RESPIRATION RATE: 19 BRPM | DIASTOLIC BLOOD PRESSURE: 77 MMHG | TEMPERATURE: 97.4 F

## 2021-10-09 PROBLEM — O40.9XX0 POLYHYDRAMNIOS, ANTEPARTUM: Status: RESOLVED | Noted: 2021-09-28 | Resolved: 2021-10-09

## 2021-10-09 PROBLEM — Z3A.33 33 WEEKS GESTATION OF PREGNANCY: Status: RESOLVED | Noted: 2021-09-24 | Resolved: 2021-10-09

## 2021-10-09 PROCEDURE — 96372 THER/PROPH/DIAG INJ SC/IM: CPT

## 2021-10-09 PROCEDURE — 6360000002 HC RX W HCPCS: Performed by: STUDENT IN AN ORGANIZED HEALTH CARE EDUCATION/TRAINING PROGRAM

## 2021-10-09 RX ORDER — BETAMETHASONE SODIUM PHOSPHATE AND BETAMETHASONE ACETATE 3; 3 MG/ML; MG/ML
12 INJECTION, SUSPENSION INTRA-ARTICULAR; INTRALESIONAL; INTRAMUSCULAR; SOFT TISSUE ONCE
Status: COMPLETED | OUTPATIENT
Start: 2021-10-09 | End: 2021-10-09

## 2021-10-09 RX ADMIN — BETAMETHASONE SODIUM PHOSPHATE AND BETAMETHASONE ACETATE 12 MG: 3; 3 INJECTION, SUSPENSION INTRA-ARTICULAR; INTRALESIONAL; INTRAMUSCULAR at 09:51

## 2021-10-10 PROBLEM — O09.90 HIGH-RISK PREGNANCY, UNSPECIFIED TRIMESTER: Status: ACTIVE | Noted: 2021-10-10

## 2021-10-10 RX ORDER — SODIUM CHLORIDE 0.9 % (FLUSH) 0.9 %
10 SYRINGE (ML) INJECTION EVERY 12 HOURS SCHEDULED
OUTPATIENT
Start: 2021-10-10

## 2021-10-10 RX ORDER — 0.9 % SODIUM CHLORIDE 0.9 %
500 INTRAVENOUS SOLUTION INTRAVENOUS ONCE
OUTPATIENT
Start: 2021-10-10 | End: 2021-10-10

## 2021-10-10 RX ORDER — ONDANSETRON 2 MG/ML
4 INJECTION INTRAMUSCULAR; INTRAVENOUS EVERY 6 HOURS PRN
OUTPATIENT
Start: 2021-10-10

## 2021-10-10 RX ORDER — SODIUM CHLORIDE 0.9 % (FLUSH) 0.9 %
10 SYRINGE (ML) INJECTION PRN
OUTPATIENT
Start: 2021-10-10

## 2021-10-10 RX ORDER — SODIUM CHLORIDE 9 MG/ML
INJECTION, SOLUTION INTRAVENOUS CONTINUOUS
OUTPATIENT
Start: 2021-10-10

## 2021-10-10 RX ORDER — LEVOTHYROXINE SODIUM 0.1 MG/1
100 TABLET ORAL DAILY
Status: CANCELLED | OUTPATIENT
Start: 2021-10-11

## 2021-10-10 RX ORDER — TRISODIUM CITRATE DIHYDRATE AND CITRIC ACID MONOHYDRATE 500; 334 MG/5ML; MG/5ML
30 SOLUTION ORAL ONCE
OUTPATIENT
Start: 2021-10-10 | End: 2021-10-10

## 2021-10-10 RX ORDER — SODIUM CHLORIDE 9 MG/ML
25 INJECTION, SOLUTION INTRAVENOUS PRN
OUTPATIENT
Start: 2021-10-10

## 2021-10-11 ENCOUNTER — TELEPHONE (OUTPATIENT)
Dept: FAMILY MEDICINE CLINIC | Age: 31
End: 2021-10-11

## 2021-10-11 ENCOUNTER — ANESTHESIA EVENT (OUTPATIENT)
Dept: LABOR AND DELIVERY | Age: 31
DRG: 539 | End: 2021-10-11
Payer: MEDICARE

## 2021-10-11 ENCOUNTER — ANESTHESIA (OUTPATIENT)
Dept: LABOR AND DELIVERY | Age: 31
DRG: 539 | End: 2021-10-11
Payer: MEDICARE

## 2021-10-11 ENCOUNTER — HOSPITAL ENCOUNTER (INPATIENT)
Age: 31
LOS: 2 days | Discharge: HOME | DRG: 539 | End: 2021-10-13
Attending: OBSTETRICS & GYNECOLOGY | Admitting: OBSTETRICS & GYNECOLOGY
Payer: MEDICARE

## 2021-10-11 VITALS — SYSTOLIC BLOOD PRESSURE: 145 MMHG | TEMPERATURE: 97.1 F | DIASTOLIC BLOOD PRESSURE: 96 MMHG | OXYGEN SATURATION: 98 %

## 2021-10-11 PROBLEM — Z3A.36 36 WEEKS GESTATION OF PREGNANCY: Status: ACTIVE | Noted: 2021-10-11

## 2021-10-11 LAB
-: NORMAL
ABO/RH: NORMAL
ALBUMIN SERPL-MCNC: 3.3 G/DL (ref 3.5–5.2)
ALBUMIN/GLOBULIN RATIO: 1.2 (ref 1–2.5)
ALP BLD-CCNC: 95 U/L (ref 35–104)
ALT SERPL-CCNC: 19 U/L (ref 5–33)
AMPHETAMINE SCREEN URINE: NEGATIVE
ANION GAP SERPL CALCULATED.3IONS-SCNC: 13 MMOL/L (ref 9–17)
ANTIBODY SCREEN: NEGATIVE
ARM BAND NUMBER: NORMAL
AST SERPL-CCNC: 16 U/L
BARBITURATE SCREEN URINE: NEGATIVE
BENZODIAZEPINE SCREEN, URINE: NEGATIVE
BILIRUB SERPL-MCNC: 0.43 MG/DL (ref 0.3–1.2)
BUN BLDV-MCNC: 9 MG/DL (ref 6–20)
BUN/CREAT BLD: ABNORMAL (ref 9–20)
BUPRENORPHINE URINE: NORMAL
CALCIUM SERPL-MCNC: 8.8 MG/DL (ref 8.6–10.4)
CANNABINOID SCREEN URINE: NEGATIVE
CHLORIDE BLD-SCNC: 105 MMOL/L (ref 98–107)
CO2: 20 MMOL/L (ref 20–31)
COCAINE METABOLITE, URINE: NEGATIVE
CREAT SERPL-MCNC: 0.66 MG/DL (ref 0.5–0.9)
CREATININE URINE: 60.3 MG/DL (ref 28–217)
EXPIRATION DATE: NORMAL
GFR AFRICAN AMERICAN: >60 ML/MIN
GFR NON-AFRICAN AMERICAN: >60 ML/MIN
GFR SERPL CREATININE-BSD FRML MDRD: ABNORMAL ML/MIN/{1.73_M2}
GFR SERPL CREATININE-BSD FRML MDRD: ABNORMAL ML/MIN/{1.73_M2}
GLUCOSE BLD-MCNC: 68 MG/DL (ref 70–99)
GLUCOSE BLD-MCNC: 69 MG/DL (ref 65–105)
HCT VFR BLD CALC: 36.5 % (ref 36.3–47.1)
HEMOGLOBIN: 11.3 G/DL (ref 11.9–15.1)
MCH RBC QN AUTO: 27.6 PG (ref 25.2–33.5)
MCHC RBC AUTO-ENTMCNC: 31 G/DL (ref 28.4–34.8)
MCV RBC AUTO: 89 FL (ref 82.6–102.9)
MDMA URINE: NORMAL
METHADONE SCREEN, URINE: NEGATIVE
METHAMPHETAMINE, URINE: NORMAL
NRBC AUTOMATED: 0 PER 100 WBC
OPIATES, URINE: NEGATIVE
OXYCODONE SCREEN URINE: NEGATIVE
PDW BLD-RTO: 13.6 % (ref 11.8–14.4)
PHENCYCLIDINE, URINE: NEGATIVE
PLATELET # BLD: 180 K/UL (ref 138–453)
PMV BLD AUTO: 10.7 FL (ref 8.1–13.5)
POTASSIUM SERPL-SCNC: 3.6 MMOL/L (ref 3.7–5.3)
PROPOXYPHENE, URINE: NORMAL
RBC # BLD: 4.1 M/UL (ref 3.95–5.11)
REASON FOR REJECTION: NORMAL
SARS-COV-2, RAPID: NOT DETECTED
SODIUM BLD-SCNC: 138 MMOL/L (ref 135–144)
SPECIMEN DESCRIPTION: NORMAL
T. PALLIDUM, IGG: NONREACTIVE
TEST INFORMATION: NORMAL
TOTAL PROTEIN, URINE: 7 MG/DL
TOTAL PROTEIN: 6.1 G/DL (ref 6.4–8.3)
TRICYCLIC ANTIDEPRESSANTS, UR: NORMAL
URINE TOTAL PROTEIN CREATININE RATIO: 0.12 (ref 0–0.2)
WBC # BLD: 7.7 K/UL (ref 3.5–11.3)
ZZ NTE CLEAN UP: ORDERED TEST: NORMAL
ZZ NTE WITH NAME CLEAN UP: SPECIMEN SOURCE: NORMAL

## 2021-10-11 PROCEDURE — 86901 BLOOD TYPING SEROLOGIC RH(D): CPT

## 2021-10-11 PROCEDURE — 85027 COMPLETE CBC AUTOMATED: CPT

## 2021-10-11 PROCEDURE — 88302 TISSUE EXAM BY PATHOLOGIST: CPT

## 2021-10-11 PROCEDURE — 3700000001 HC ADD 15 MINUTES (ANESTHESIA): Performed by: OBSTETRICS & GYNECOLOGY

## 2021-10-11 PROCEDURE — 6360000002 HC RX W HCPCS: Performed by: NURSE ANESTHETIST, CERTIFIED REGISTERED

## 2021-10-11 PROCEDURE — 80307 DRUG TEST PRSMV CHEM ANLYZR: CPT

## 2021-10-11 PROCEDURE — 2580000003 HC RX 258

## 2021-10-11 PROCEDURE — 6360000002 HC RX W HCPCS

## 2021-10-11 PROCEDURE — 87635 SARS-COV-2 COVID-19 AMP PRB: CPT

## 2021-10-11 PROCEDURE — 59514 CESAREAN DELIVERY ONLY: CPT | Performed by: OBSTETRICS & GYNECOLOGY

## 2021-10-11 PROCEDURE — 7100000000 HC PACU RECOVERY - FIRST 15 MIN: Performed by: OBSTETRICS & GYNECOLOGY

## 2021-10-11 PROCEDURE — 7100000001 HC PACU RECOVERY - ADDTL 15 MIN: Performed by: OBSTETRICS & GYNECOLOGY

## 2021-10-11 PROCEDURE — 86900 BLOOD TYPING SEROLOGIC ABO: CPT

## 2021-10-11 PROCEDURE — 2500000003 HC RX 250 WO HCPCS: Performed by: NURSE ANESTHETIST, CERTIFIED REGISTERED

## 2021-10-11 PROCEDURE — 3700000000 HC ANESTHESIA ATTENDED CARE: Performed by: OBSTETRICS & GYNECOLOGY

## 2021-10-11 PROCEDURE — 0UT70ZZ RESECTION OF BILATERAL FALLOPIAN TUBES, OPEN APPROACH: ICD-10-PCS | Performed by: OBSTETRICS & GYNECOLOGY

## 2021-10-11 PROCEDURE — 82570 ASSAY OF URINE CREATININE: CPT

## 2021-10-11 PROCEDURE — 3609079900 HC CESAREAN SECTION: Performed by: OBSTETRICS & GYNECOLOGY

## 2021-10-11 PROCEDURE — 10907ZC DRAINAGE OF AMNIOTIC FLUID, THERAPEUTIC FROM PRODUCTS OF CONCEPTION, VIA NATURAL OR ARTIFICIAL OPENING: ICD-10-PCS | Performed by: OBSTETRICS & GYNECOLOGY

## 2021-10-11 PROCEDURE — 97607 NEG PRS WND THR NDME<=50SQCM: CPT | Performed by: OBSTETRICS & GYNECOLOGY

## 2021-10-11 PROCEDURE — 82947 ASSAY GLUCOSE BLOOD QUANT: CPT

## 2021-10-11 PROCEDURE — 58700 REMOVAL OF FALLOPIAN TUBE: CPT | Performed by: OBSTETRICS & GYNECOLOGY

## 2021-10-11 PROCEDURE — 6370000000 HC RX 637 (ALT 250 FOR IP)

## 2021-10-11 PROCEDURE — 88307 TISSUE EXAM BY PATHOLOGIST: CPT

## 2021-10-11 PROCEDURE — 84156 ASSAY OF PROTEIN URINE: CPT

## 2021-10-11 PROCEDURE — 80053 COMPREHEN METABOLIC PANEL: CPT

## 2021-10-11 PROCEDURE — 93005 ELECTROCARDIOGRAM TRACING: CPT | Performed by: STUDENT IN AN ORGANIZED HEALTH CARE EDUCATION/TRAINING PROGRAM

## 2021-10-11 PROCEDURE — 86780 TREPONEMA PALLIDUM: CPT

## 2021-10-11 PROCEDURE — 1220000000 HC SEMI PRIVATE OB R&B

## 2021-10-11 PROCEDURE — 86850 RBC ANTIBODY SCREEN: CPT

## 2021-10-11 PROCEDURE — 36415 COLL VENOUS BLD VENIPUNCTURE: CPT

## 2021-10-11 PROCEDURE — 2709999900 HC NON-CHARGEABLE SUPPLY: Performed by: OBSTETRICS & GYNECOLOGY

## 2021-10-11 RX ORDER — NALOXONE HYDROCHLORIDE 0.4 MG/ML
0.4 INJECTION, SOLUTION INTRAMUSCULAR; INTRAVENOUS; SUBCUTANEOUS PRN
Status: DISCONTINUED | OUTPATIENT
Start: 2021-10-11 | End: 2021-10-13 | Stop reason: HOSPADM

## 2021-10-11 RX ORDER — SODIUM CHLORIDE 0.9 % (FLUSH) 0.9 %
10 SYRINGE (ML) INJECTION EVERY 12 HOURS SCHEDULED
Status: DISCONTINUED | OUTPATIENT
Start: 2021-10-11 | End: 2021-10-11

## 2021-10-11 RX ORDER — KETOROLAC TROMETHAMINE 30 MG/ML
30 INJECTION, SOLUTION INTRAMUSCULAR; INTRAVENOUS EVERY 6 HOURS
Status: DISPENSED | OUTPATIENT
Start: 2021-10-11 | End: 2021-10-12

## 2021-10-11 RX ORDER — IBUPROFEN 600 MG/1
600 TABLET ORAL EVERY 6 HOURS PRN
Qty: 30 TABLET | Refills: 0 | Status: SHIPPED | OUTPATIENT
Start: 2021-10-11

## 2021-10-11 RX ORDER — SUCCINYLCHOLINE/SOD CL,ISO/PF 100 MG/5ML
SYRINGE (ML) INTRAVENOUS PRN
Status: DISCONTINUED | OUTPATIENT
Start: 2021-10-11 | End: 2021-10-11 | Stop reason: SDUPTHER

## 2021-10-11 RX ORDER — SODIUM CHLORIDE 9 MG/ML
25 INJECTION, SOLUTION INTRAVENOUS PRN
Status: DISCONTINUED | OUTPATIENT
Start: 2021-10-11 | End: 2021-10-11

## 2021-10-11 RX ORDER — DEXAMETHASONE SODIUM PHOSPHATE 10 MG/ML
INJECTION INTRAMUSCULAR; INTRAVENOUS PRN
Status: DISCONTINUED | OUTPATIENT
Start: 2021-10-11 | End: 2021-10-11 | Stop reason: SDUPTHER

## 2021-10-11 RX ORDER — SENNA AND DOCUSATE SODIUM 50; 8.6 MG/1; MG/1
1 TABLET, FILM COATED ORAL DAILY
Qty: 14 TABLET | Refills: 0 | Status: SHIPPED | OUTPATIENT
Start: 2021-10-11 | End: 2021-10-25

## 2021-10-11 RX ORDER — ONDANSETRON 4 MG/1
4 TABLET, ORALLY DISINTEGRATING ORAL 3 TIMES DAILY PRN
Qty: 21 TABLET | Refills: 0 | Status: SHIPPED | OUTPATIENT
Start: 2021-10-11

## 2021-10-11 RX ORDER — BISACODYL 10 MG
10 SUPPOSITORY, RECTAL RECTAL DAILY PRN
Status: DISCONTINUED | OUTPATIENT
Start: 2021-10-11 | End: 2021-10-13 | Stop reason: HOSPADM

## 2021-10-11 RX ORDER — PROPOFOL 10 MG/ML
INJECTION, EMULSION INTRAVENOUS PRN
Status: DISCONTINUED | OUTPATIENT
Start: 2021-10-11 | End: 2021-10-11 | Stop reason: SDUPTHER

## 2021-10-11 RX ORDER — SODIUM CHLORIDE 9 MG/ML
INJECTION, SOLUTION INTRAVENOUS CONTINUOUS
Status: DISCONTINUED | OUTPATIENT
Start: 2021-10-11 | End: 2021-10-11

## 2021-10-11 RX ORDER — TRISODIUM CITRATE DIHYDRATE AND CITRIC ACID MONOHYDRATE 500; 334 MG/5ML; MG/5ML
30 SOLUTION ORAL ONCE
Status: COMPLETED | OUTPATIENT
Start: 2021-10-11 | End: 2021-10-11

## 2021-10-11 RX ORDER — ONDANSETRON 2 MG/ML
4 INJECTION INTRAMUSCULAR; INTRAVENOUS EVERY 6 HOURS PRN
Status: DISCONTINUED | OUTPATIENT
Start: 2021-10-11 | End: 2021-10-11

## 2021-10-11 RX ORDER — MORPHINE SULFATE 10 MG/ML
INJECTION, SOLUTION INTRAMUSCULAR; INTRAVENOUS PRN
Status: DISCONTINUED | OUTPATIENT
Start: 2021-10-11 | End: 2021-10-11 | Stop reason: SDUPTHER

## 2021-10-11 RX ORDER — OXYCODONE HYDROCHLORIDE AND ACETAMINOPHEN 5; 325 MG/1; MG/1
1 TABLET ORAL EVERY 4 HOURS PRN
Status: DISCONTINUED | OUTPATIENT
Start: 2021-10-12 | End: 2021-10-13 | Stop reason: HOSPADM

## 2021-10-11 RX ORDER — VITAMIN A, ASCORBIC ACID, CHOLECALCIFEROL, .ALPHA.-TOCOPHEROL ACETATE, DL-, THIAMINE MONONITRATE, RIBOFLAVIN, NIACINAMIDE, PYRIDOXINE HYDROCHLORIDE, FOLIC ACID, CYANOCOBALAMIN, CALCIUM CARBONATE, IRON, ZINC OXIDE, AND CUPRIC OXIDE 4000; 120; 400; 22; 1.84; 3; 20; 10; 1; 12; 200; 29; 25; 2 [IU]/1; MG/1; [IU]/1; [IU]/1; MG/1; MG/1; MG/1; MG/1; MG/1; UG/1; MG/1; MG/1; MG/1; MG/1
1 TABLET ORAL DAILY
Status: DISCONTINUED | OUTPATIENT
Start: 2021-10-11 | End: 2021-10-13 | Stop reason: HOSPADM

## 2021-10-11 RX ORDER — ROCURONIUM BROMIDE 10 MG/ML
INJECTION, SOLUTION INTRAVENOUS PRN
Status: DISCONTINUED | OUTPATIENT
Start: 2021-10-11 | End: 2021-10-11 | Stop reason: SDUPTHER

## 2021-10-11 RX ORDER — LANOLIN 72 %
OINTMENT (GRAM) TOPICAL
Status: DISCONTINUED | OUTPATIENT
Start: 2021-10-11 | End: 2021-10-13 | Stop reason: HOSPADM

## 2021-10-11 RX ORDER — SODIUM CHLORIDE 9 MG/ML
25 INJECTION, SOLUTION INTRAVENOUS PRN
Status: DISCONTINUED | OUTPATIENT
Start: 2021-10-11 | End: 2021-10-13 | Stop reason: HOSPADM

## 2021-10-11 RX ORDER — KETOROLAC TROMETHAMINE 30 MG/ML
INJECTION, SOLUTION INTRAMUSCULAR; INTRAVENOUS PRN
Status: DISCONTINUED | OUTPATIENT
Start: 2021-10-11 | End: 2021-10-11 | Stop reason: SDUPTHER

## 2021-10-11 RX ORDER — TRANEXAMIC ACID 100 MG/ML
INJECTION, SOLUTION INTRAVENOUS PRN
Status: DISCONTINUED | OUTPATIENT
Start: 2021-10-11 | End: 2021-10-11 | Stop reason: SDUPTHER

## 2021-10-11 RX ORDER — DIPHENHYDRAMINE HYDROCHLORIDE 50 MG/ML
INJECTION INTRAMUSCULAR; INTRAVENOUS PRN
Status: DISCONTINUED | OUTPATIENT
Start: 2021-10-11 | End: 2021-10-11 | Stop reason: SDUPTHER

## 2021-10-11 RX ORDER — DIPHENHYDRAMINE HYDROCHLORIDE 50 MG/ML
25 INJECTION INTRAMUSCULAR; INTRAVENOUS EVERY 6 HOURS PRN
Status: DISCONTINUED | OUTPATIENT
Start: 2021-10-11 | End: 2021-10-13 | Stop reason: HOSPADM

## 2021-10-11 RX ORDER — ALBUTEROL SULFATE 90 UG/1
2 AEROSOL, METERED RESPIRATORY (INHALATION) EVERY 6 HOURS PRN
Status: DISCONTINUED | OUTPATIENT
Start: 2021-10-11 | End: 2021-10-13 | Stop reason: HOSPADM

## 2021-10-11 RX ORDER — SIMETHICONE 80 MG
80 TABLET,CHEWABLE ORAL EVERY 6 HOURS PRN
Status: DISCONTINUED | OUTPATIENT
Start: 2021-10-11 | End: 2021-10-13 | Stop reason: HOSPADM

## 2021-10-11 RX ORDER — SODIUM CHLORIDE 0.9 % (FLUSH) 0.9 %
5-40 SYRINGE (ML) INJECTION PRN
Status: DISCONTINUED | OUTPATIENT
Start: 2021-10-11 | End: 2021-10-13 | Stop reason: HOSPADM

## 2021-10-11 RX ORDER — OXYCODONE HYDROCHLORIDE AND ACETAMINOPHEN 5; 325 MG/1; MG/1
2 TABLET ORAL EVERY 4 HOURS PRN
Status: DISCONTINUED | OUTPATIENT
Start: 2021-10-12 | End: 2021-10-13 | Stop reason: HOSPADM

## 2021-10-11 RX ORDER — ACETAMINOPHEN 500 MG
1000 TABLET ORAL EVERY 6 HOURS PRN
Status: CANCELLED | OUTPATIENT
Start: 2021-10-11

## 2021-10-11 RX ORDER — SODIUM CHLORIDE 0.9 % (FLUSH) 0.9 %
10 SYRINGE (ML) INJECTION PRN
Status: DISCONTINUED | OUTPATIENT
Start: 2021-10-11 | End: 2021-10-11

## 2021-10-11 RX ORDER — DOCUSATE SODIUM 100 MG/1
100 CAPSULE, LIQUID FILLED ORAL 2 TIMES DAILY
Status: DISCONTINUED | OUTPATIENT
Start: 2021-10-11 | End: 2021-10-13 | Stop reason: HOSPADM

## 2021-10-11 RX ORDER — SODIUM CHLORIDE 9 MG/ML
INJECTION, SOLUTION INTRAVENOUS CONTINUOUS
Status: DISCONTINUED | OUTPATIENT
Start: 2021-10-11 | End: 2021-10-12

## 2021-10-11 RX ORDER — OXYCODONE HYDROCHLORIDE AND ACETAMINOPHEN 5; 325 MG/1; MG/1
1 TABLET ORAL EVERY 6 HOURS PRN
Qty: 20 TABLET | Refills: 0 | Status: SHIPPED | OUTPATIENT
Start: 2021-10-11 | End: 2021-10-18

## 2021-10-11 RX ORDER — ONDANSETRON 2 MG/ML
INJECTION INTRAMUSCULAR; INTRAVENOUS PRN
Status: DISCONTINUED | OUTPATIENT
Start: 2021-10-11 | End: 2021-10-11 | Stop reason: SDUPTHER

## 2021-10-11 RX ORDER — POLYETHYLENE GLYCOL 3350 17 G/17G
17 POWDER, FOR SOLUTION ORAL DAILY PRN
Status: DISCONTINUED | OUTPATIENT
Start: 2021-10-11 | End: 2021-10-13 | Stop reason: HOSPADM

## 2021-10-11 RX ORDER — ONDANSETRON 2 MG/ML
4 INJECTION INTRAMUSCULAR; INTRAVENOUS EVERY 6 HOURS PRN
Status: DISCONTINUED | OUTPATIENT
Start: 2021-10-11 | End: 2021-10-13 | Stop reason: HOSPADM

## 2021-10-11 RX ORDER — 0.9 % SODIUM CHLORIDE 0.9 %
500 INTRAVENOUS SOLUTION INTRAVENOUS ONCE
Status: COMPLETED | OUTPATIENT
Start: 2021-10-11 | End: 2021-10-12

## 2021-10-11 RX ORDER — IBUPROFEN 600 MG/1
600 TABLET ORAL EVERY 6 HOURS PRN
Status: DISCONTINUED | OUTPATIENT
Start: 2021-10-12 | End: 2021-10-13 | Stop reason: HOSPADM

## 2021-10-11 RX ADMIN — DEXTROSE MONOHYDRATE 2000 MG: 50 INJECTION, SOLUTION INTRAVENOUS at 22:06

## 2021-10-11 RX ADMIN — ONDANSETRON 4 MG: 2 INJECTION INTRAMUSCULAR; INTRAVENOUS at 20:04

## 2021-10-11 RX ADMIN — Medication 200 MG: at 14:18

## 2021-10-11 RX ADMIN — HYDROMORPHONE HYDROCHLORIDE 1 MG: 1 INJECTION, SOLUTION INTRAMUSCULAR; INTRAVENOUS; SUBCUTANEOUS at 20:17

## 2021-10-11 RX ADMIN — SODIUM CITRATE AND CITRIC ACID MONOHYDRATE 30 ML: 500; 334 SOLUTION ORAL at 13:50

## 2021-10-11 RX ADMIN — DEXAMETHASONE SODIUM PHOSPHATE 10 MG: 10 INJECTION INTRAMUSCULAR; INTRAVENOUS at 14:22

## 2021-10-11 RX ADMIN — SODIUM CHLORIDE: 9 INJECTION, SOLUTION INTRAVENOUS at 11:10

## 2021-10-11 RX ADMIN — Medication 12.5 MG: at 14:23

## 2021-10-11 RX ADMIN — Medication 909 ML/HR: at 14:23

## 2021-10-11 RX ADMIN — KETOROLAC TROMETHAMINE 30 MG: 30 INJECTION, SOLUTION INTRAMUSCULAR; INTRAVENOUS at 21:52

## 2021-10-11 RX ADMIN — MORPHINE SULFATE 5 MG: 10 INJECTION, SOLUTION INTRAMUSCULAR; INTRAVENOUS at 14:30

## 2021-10-11 RX ADMIN — Medication 500 MG: at 14:07

## 2021-10-11 RX ADMIN — ONDANSETRON 4 MG: 2 INJECTION, SOLUTION INTRAMUSCULAR; INTRAVENOUS at 15:00

## 2021-10-11 RX ADMIN — SODIUM CHLORIDE: 9 INJECTION, SOLUTION INTRAVENOUS at 16:00

## 2021-10-11 RX ADMIN — ROCURONIUM BROMIDE 10 MG: 10 INJECTION INTRAVENOUS at 14:45

## 2021-10-11 RX ADMIN — CEFAZOLIN 2000 MG: 10 INJECTION, POWDER, FOR SOLUTION INTRAVENOUS at 13:50

## 2021-10-11 RX ADMIN — MORPHINE SULFATE 5 MG: 10 INJECTION, SOLUTION INTRAMUSCULAR; INTRAVENOUS at 15:15

## 2021-10-11 RX ADMIN — PROPOFOL 200 MG: 10 INJECTION, EMULSION INTRAVENOUS at 14:18

## 2021-10-11 RX ADMIN — MORPHINE SULFATE 5 MG: 10 INJECTION, SOLUTION INTRAMUSCULAR; INTRAVENOUS at 14:22

## 2021-10-11 RX ADMIN — SUGAMMADEX 221 MG: 100 INJECTION, SOLUTION INTRAVENOUS at 15:18

## 2021-10-11 RX ADMIN — SODIUM CHLORIDE: 9 INJECTION, SOLUTION INTRAVENOUS at 12:15

## 2021-10-11 RX ADMIN — TRANEXAMIC ACID 1000 MG: 1 INJECTION, SOLUTION INTRAVENOUS at 14:24

## 2021-10-11 RX ADMIN — KETOROLAC TROMETHAMINE 30 MG: 30 INJECTION, SOLUTION INTRAMUSCULAR at 14:54

## 2021-10-11 RX ADMIN — MORPHINE SULFATE 5 MG: 10 INJECTION, SOLUTION INTRAMUSCULAR; INTRAVENOUS at 15:29

## 2021-10-11 RX ADMIN — SODIUM CHLORIDE: 9 INJECTION, SOLUTION INTRAVENOUS at 22:51

## 2021-10-11 RX ADMIN — HYDROMORPHONE HYDROCHLORIDE 1 MG: 1 INJECTION, SOLUTION INTRAMUSCULAR; INTRAVENOUS; SUBCUTANEOUS at 16:24

## 2021-10-11 RX ADMIN — SODIUM CHLORIDE: 9 INJECTION, SOLUTION INTRAVENOUS at 14:51

## 2021-10-11 ASSESSMENT — PULMONARY FUNCTION TESTS
PIF_VALUE: 17
PIF_VALUE: 19
PIF_VALUE: 21
PIF_VALUE: 16
PIF_VALUE: 5
PIF_VALUE: 17
PIF_VALUE: 1
PIF_VALUE: 1
PIF_VALUE: 16
PIF_VALUE: 18
PIF_VALUE: 24
PIF_VALUE: 20
PIF_VALUE: 6
PIF_VALUE: 8
PIF_VALUE: 15
PIF_VALUE: 15
PIF_VALUE: 17
PIF_VALUE: 1
PIF_VALUE: 1
PIF_VALUE: 7
PIF_VALUE: 16
PIF_VALUE: 24
PIF_VALUE: 16
PIF_VALUE: 2
PIF_VALUE: 23
PIF_VALUE: 16
PIF_VALUE: 1
PIF_VALUE: 16
PIF_VALUE: 1
PIF_VALUE: 17
PIF_VALUE: 19
PIF_VALUE: 16
PIF_VALUE: 16
PIF_VALUE: 1
PIF_VALUE: 17
PIF_VALUE: 19
PIF_VALUE: 18
PIF_VALUE: 15
PIF_VALUE: 3
PIF_VALUE: 0
PIF_VALUE: 1
PIF_VALUE: 24
PIF_VALUE: 18
PIF_VALUE: 0
PIF_VALUE: 17
PIF_VALUE: 25
PIF_VALUE: 20
PIF_VALUE: 15
PIF_VALUE: 21
PIF_VALUE: 25
PIF_VALUE: 1
PIF_VALUE: 6
PIF_VALUE: 1
PIF_VALUE: 15
PIF_VALUE: 19
PIF_VALUE: 1
PIF_VALUE: 16
PIF_VALUE: 15
PIF_VALUE: 16
PIF_VALUE: 2
PIF_VALUE: 20
PIF_VALUE: 16
PIF_VALUE: 16
PIF_VALUE: 17
PIF_VALUE: 0
PIF_VALUE: 21
PIF_VALUE: 16
PIF_VALUE: 17
PIF_VALUE: 16
PIF_VALUE: 1
PIF_VALUE: 16
PIF_VALUE: 23
PIF_VALUE: 17
PIF_VALUE: 1
PIF_VALUE: 16
PIF_VALUE: 5
PIF_VALUE: 8
PIF_VALUE: 16
PIF_VALUE: 16
PIF_VALUE: 17
PIF_VALUE: 5
PIF_VALUE: 1
PIF_VALUE: 15
PIF_VALUE: 21

## 2021-10-11 ASSESSMENT — PAIN SCALES - GENERAL
PAINLEVEL_OUTOF10: 5
PAINLEVEL_OUTOF10: 9
PAINLEVEL_OUTOF10: 7

## 2021-10-11 NOTE — FLOWSHEET NOTE
Patient admitted to room 749 from OB RR via bed. Oriented to room and surroundings. Plan of care reviewed. Verbalized understanding. Instructed on infant security and safe sleep practices. Preventing falls education provided . The following handouts given: A New Beginning: Your Guide to Postpartum Care, Rounding, gs Security System,Babies Cry A lot, Safe Sleep, Security and Visitation Guidelines. Call light placed within reach.

## 2021-10-11 NOTE — TELEPHONE ENCOUNTER
[facility]) ED Follow up Call    Reason for ED visit:  YOLANDE Valentin Louise Norris , this is Sundra Tim from Dr. Salvador Burgos office, just calling to see how you are doing after your recent ED visit. Did you receive discharge instructions? Yes  Do you understand the discharge instructions? YES  Did the ED give you any new prescriptions? No: NONE GIVEN  Were you able to fill your prescriptions? No: NONE GIVEN      Do you have one of our red, yellow and green  Zone sheets that help you to determine when you should go to the ED? Not Applicable      Do you need or want to make a follow up appt with your PCP? Not Applicable    Do you have any further needs in the home i.e. Equipment? Not Applicable        FU appts/Provider:    No future appointments.

## 2021-10-11 NOTE — ANESTHESIA PRE PROCEDURE
Department of Anesthesiology  Preprocedure Note       Name:  Marjory Olszewski   Age:  27 y.o.  :  1990                                          MRN:  [id number]         Date:  10/11/2021      Surgeon: Myah Garcia):  Ronnie Alvarez DO    Procedure: Procedure(s):   SECTION    Medications prior to admission:   Prior to Admission medications    Medication Sig Start Date End Date Taking?  Authorizing Provider   ursodiol (ACTIGALL) 300 MG capsule Take 1 capsule by mouth 2 times daily 9/20/21 10/20/21 Yes DEAN Redman - CNP   ferrous sulfate (IRON 325) 325 (65 Fe) MG tablet Take 1 tablet by mouth 2 times daily 21  Yes DEAN Robles - NP   levothyroxine (SYNTHROID) 100 MCG tablet take 1 tablet by mouth once daily 21  Yes DEAN Redman - CNP   aspirin EC 81 MG EC tablet Take 1 tablet by mouth daily 21  Yes Raf Lung   Prenatal Vit-Fe Fumarate-FA (PRENATAL VITAMINS PLUS PO) Take by mouth   Yes Historical Provider, MD   levothyroxine (SYNTHROID) 25 MCG tablet Take 1 tablet by mouth daily 8/10/21 9/9/21  DEAN Redman CNP   TRUEplus Lancets 33G MISC use 1 LANCET to [address] [place] [address] four times a day 21   Historical Provider, MD   TRUE METRIX BLOOD GLUCOSE TEST strip use to CHECK BLOOD SUGAR four times a day 21   Historical Provider, MD   Blood Glucose Monitoring Suppl (TRUE METRIX METER) w/Device KIT use four times a day to [address] [place] [address] 21   Historical Provider, MD   RA Alcohol Swabs 70 % PADS use to CHECK BLOOD SUGAR four times a day 21   Historical Provider, MD   acetaminophen (APAP EXTRA STRENGTH) 500 MG tablet Take 2 tablets by mouth every 6 hours as needed for Pain 21   Seda L Louise   albuterol sulfate  (90 Base) MCG/ACT inhaler Inhale 2 puffs into the lungs every 6 hours as needed for Wheezing or Shortness of Breath 19   Mia Lopez MD   Respiratory Therapy Supplies (BREATHERITE VALVED MDI CHAMBER) RICHARD To be used with inhaler 1/13/17   Tomy Osorio MD       Current medications:    Current Facility-Administered Medications   Medication Dose Route Frequency Provider Last Rate Last Admin    albuterol sulfate  (90 Base) MCG/ACT inhaler 2 puff  2 puff Inhalation Q6H PRN April MD Bandar        sodium chloride flush 0.9 % injection 10 mL  10 mL IntraVENous 2 times per day April Ip, MD        sodium chloride flush 0.9 % injection 10 mL  10 mL IntraVENous PRN April Ip, MD        0.9 % sodium chloride infusion  25 mL IntraVENous PRN April Ip, MD        citric acid-sodium citrate (BICITRA) solution 30 mL  30 mL Oral Once April Ip, MD        oxytocin (PITOCIN) 30 units in 500 mL infusion  87.3 avni-units/min IntraVENous Continuous PRN April Ip, MD        And    oxytocin (PITOCIN) 10 unit bolus from the bag  10 Units IntraVENous PRN April Ip, MD        ondansetron (ZOFRAN) injection 4 mg  4 mg IntraVENous Q6H PRN April Ip, MD        0.9 % sodium chloride infusion   IntraVENous Continuous April Ip, MD        ceFAZolin (ANCEF) 2000 mg in dextrose 5 % 50 mL IVPB  2,000 mg IntraVENous Once April Ip, MD        azithromycin (ZITHROMAX) 500 mg in dextrose 5% 250 mL IVPB  500 mg IntraVENous Once April Ip, MD           Allergies:     Allergies   Allergen Reactions    Latex Hives and Swelling    Other Shortness Of Breath     Bleach causes reactive airway disease    Nut [Peanut-Containing Drug Products] Nausea And Vomiting    Peanut (Diagnostic)      Other reaction(s): Nausea And Vomiting       Problem List:    Patient Active Problem List   Diagnosis Code    Celestone 10/8 & 10/9 O09.90    Reactive airway disease J45.909    Allergic sinusitis J30.9    Psoriasis L40.9    Neck pain M54.2    Hypothyroidism E03.9    Chronic fatigue R53.82    Seasonal allergic rhinitis due to pollen J30.1    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    Positive TEO (antinuclear antibody) R76.8    Lumbar disc herniation M51.26    Lumbar radiculopathy M54.16    Prediabetes R73.03    Lumbar spondylosis M47.816    Spondylolisthesis at L4-L5 level M43.16    Right ovarian cyst N83.201    Class 2 obesity due to excess calories without serious comorbidity with body mass index (BMI) of 35.0 to 35.9 in adult E66.09, Z68.35    Postauricular adenopathy R59.0    Sacroiliitis, not elsewhere classified (HCC) M46.1    Dizziness R42    POTS (postural orthostatic tachycardia syndrome) I49.8    CSF leak G96.00    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    Double vision H53.2    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    H/O:   Z98.891     (vaginal birth after ) X 2 ( and ) O31.200    Family history of congenital heart defect Z82.79    Family history of Down syndrome Z82.79    Family history of diabetes mellitus in father Z80.1    History of Chiari malformation Z86.69    History of back surgery Z98.890    Tarlov cysts G96.191    Pregestational Diabetes R73.09    cHTN (no meds) R03.0    Low-lying placenta-rslvd O44.40    Gestational diabetes O24.419    Elevated liver enzymes, bile salts pending 2021 R74.8    Intrahepatic cholestasis of pregnancy O26.619, K83.1    Polyhydramnios O40. 9XX0    High-risk pregnancy, unspecified trimester O09.90    36 weeks gestation of pregnancy Z3A.36       Past Medical History:        Diagnosis Date    Allergic rhinitis     Class 2 obesity due to excess calories without serious comorbidity with body mass index (BMI) of 35.0 to 35.9 in adult 6914    Complication of anesthesia     epidural stopped working    History of Chiari malformation     Hypothyroidism     Migraines     Neuropathy     Osteoarthritis     POTS (postural orthostatic tachycardia syndrome)     Psoriasis     RAD (reactive airway disease)     triggered by bleach only    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     Tarlov cysts     treated surgically in [place]    Thyroid disease     Vasovagal syncope     Wears glasses        Past Surgical History:        Procedure Laterality Date    BACK SURGERY  2020    reconstructive, 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     SECTION  2010    EPIDURAL STEROID INJECTION Left 2019    EPIDURAL INJECTION s2 performed by Matias Etienne MD at [facility] Bilateral 2018    bilat facet block no steroid    NERVE BLOCK  2019    S2 bupivacaine injection    SPINE SURGERY      sacral cysts and then several surgeries to correct spinal fluid leaks    US DRAIN SOFT TISSUE ABSCESS  10/26/2020    US DRAIN SOFT TISSUE ABSCESS 10/26/2020 [facility]TRAS[facility]       Social History:    Social History     Tobacco Use    Smoking status: Never Smoker    Smokeless tobacco: Never Used   Substance Use Topics    Alcohol use: No                                Counseling given: Not Answered      Vital Signs (Current):   Vitals:    10/11/21 1055 10/11/21 1057   BP: 132/79    Pulse: 90    Resp: 20    Temp: 98.2 °F (36.8 °C)    Weight:  244 lb (110.7 kg)   Height:  5' 10\" (1.778 m)                                              BP Readings from Last 3 Encounters:   10/11/21 132/79   10/09/21 130/77   10/08/21 124/78       NPO Status:                                                                                 BMI:   Wt Readings from Last 3 Encounters:   10/11/21 244 lb (110.7 kg)   10/06/21 244 lb (110.7 kg)   10/05/21 242 lb (109.8 kg)     Body mass index is 35.01 kg/m².     CBC:   Lab Results   Component Value Date    WBC 7.7 10/11/2021    RBC 4.10 10/11/2021    HGB 11.3 10/11/2021    HCT 36.5 10/11/2021    MCV 89.0 10/11/2021    RDW 13.6 10/11/2021     10/11/2021     10/01/2012       CMP:   Lab Results   Component Value Date     2021    K 3.9 2021     2021    CO2 20 2021    BUN 5 2021    CREATININE 0.58 2021    GFRAA >60 2021    LABGLOM >60 2021 GLUCOSE 116 09/06/2021    PROT 5.8 09/06/2021    CALCIUM 8.8 09/06/2021    BILITOT 0.52 09/06/2021    ALKPHOS 89 09/06/2021    AST 43 09/20/2021     09/20/2021       POC Tests:   Recent Labs     10/11/21  1134   POCGLU 69       Coags:   Lab Results   Component Value Date    PROTIME 9.6 10/26/2020    INR 0.9 10/26/2020    APTT 23.7 10/26/2020       HCG (If Applicable):   Lab Results   Component Value Date    HCG NEGATIVE 06/20/2019    HCGQUANT 75,055 (H) 03/22/2021        ABGs: No results found for: PHART, PO2ART, FXB0QSU, OVI7KPR, BEART, Z5RTAZBA     Type & Screen (If Applicable):  No results found for: LABABO, LABRH    Drug/Infectious Status (If Applicable):  Lab Results   Component Value Date    HEPCAB NONREACTIVE 03/17/2021       COVID-19 Screening (If Applicable):   Lab Results   Component Value Date    COVID19 Not Detected 10/11/2021         TTE 6/2020  Summary  Normal LV size and wall thickness. No obvious wall motion abnormality seen. Normal LV systolic function with LVEF >55%. Normal RV size and function. RV systolic pressure appears normal.  Normal size LA and RA. No obvious significant structural valvular abnormality noted. No significant valvular stenosis or regurgitation noted. Normal aortic root dimension. No significant pericardial effusion. No obvious intra-cardiac mass or shunt noted      Anesthesia Evaluation    Airway: Mallampati: III  TM distance: >3 FB   Neck ROM: full  Mouth opening: > = 3 FB Dental:    (+) other      Pulmonary:normal exam                               Cardiovascular:Negative CV ROS          ECG reviewed      Echocardiogram reviewed                  Neuro/Psych:   (+) neuromuscular disease:, headaches: migraine headaches,             GI/Hepatic/Renal:   (+) hepatitis:, liver disease:,           Endo/Other:    (+) Diabetes, hypothyroidism: arthritis:., .                 Abdominal:             Vascular:           Other Findings:             Anesthesia Plan      general     ASA 3           MIPS: Postoperative opioids intended. Anesthetic plan and risks discussed with patient. Plan discussed with CRNA.                 Angelina Velez MD   10/11/2021
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
## 2021-10-11 NOTE — DISCHARGE SUMMARY
Obstetric Discharge Summary  [facility]    Patient Name: Eliu Joe  Patient : 1990  Primary Care Physician: Tomasz Diaz MD  Admit Date: 10/11/2021    Principal Diagnosis: IUP at 36w1d, admitted for scheduled repeat  section with risk reducing salphinectomy    Her pregnancy has been complicated by:   Patient Active Problem List   Diagnosis    Celestone 10/8 & 10/9    Reactive airway disease    Allergic sinusitis    Psoriasis    Neck pain    Hypothyroidism    Chronic fatigue    Seasonal allergic rhinitis due to pollen    Anti-RNP antibodies present    Positive TEO (antinuclear antibody)    Lumbar disc herniation    Lumbar radiculopathy    Prediabetes    Lumbar spondylosis    Spondylolisthesis at L4-L5 level    Right ovarian cyst    Class 2 obesity due to excess calories without serious comorbidity with body mass index (BMI) of 35.0 to 35.9 in adult    Postauricular adenopathy    Sacroiliitis, not elsewhere classified (HCC)    Dizziness    POTS (postural orthostatic tachycardia syndrome)    CSF leak    Cyst of spinal meninges    Double vision    Hemicrania continua    H/O:       (vaginal birth after ) X 2 ( and )    Family history of congenital heart defect    Family history of Down syndrome    Family history of diabetes mellitus in father    History of Chiari malformation    History of back surgery    Tarlov cysts    Pregestational Diabetes    cHTN (no meds)    Low-lying placenta-rslvd    Gestational diabetes    Elevated liver enzymes, bile salts pending 2021    Intrahepatic cholestasis of pregnancy    Polyhydramnios    High-risk pregnancy, unspecified trimester    RLTCS w/ RRS (gen) 10/11/21 M Apg  Wt 7#1    Patient-requested procedure    BMI 35.0-35.9,adult       Infection Present?: No  Hospital Acquired: No    Surgical Operations & Procedures:  Analgesia: general  Delivery Type:  Delivery: See Labor and Delivery Summary   Laceration(s): Absent    Consultations: NICU and Anesthesia    Pertinent Findings & Procedures: Tyrell Gilman is a 27 y.o. female [id number] at 36w1d admitted for scheduled repeat  section with risk reducing salphinectomy; received Ancef/Azithro and Bicitra preoperatively. She delivered by repeat low transverse  a Live Born infant on 10/11/21. Information for the patient's :  Brennen Servin [[id number]]   male   Birth Weight: 7 lb 1.4 oz (3.215 kg)       Apgars: 8 at 1 minute and 8 at 5 minutes. Postpartum course: normal.    FBS on POD #1 102, Hgb 10.0    Course of patient: uncomplicated    Discharge to: Home    Readmission planned: no     Recommendations on Discharge:     Medications:      Medication List      START taking these medications    ibuprofen 600 MG tablet  Commonly known as: ADVIL;MOTRIN  Take 1 tablet by mouth every 6 hours as needed for Pain     ondansetron 4 MG disintegrating tablet  Commonly known as: ZOFRAN-ODT  Take 1 tablet by mouth 3 times daily as needed for Nausea or Vomiting     oxyCODONE-acetaminophen 5-325 MG per tablet  Commonly known as: Percocet  Take 1 tablet by mouth every 6 hours as needed for Pain for up to 7 days. Intended supply: 7 days.  Take lowest dose possible to manage pain     sennosides-docusate sodium 8.6-50 MG tablet  Commonly known as: SENOKOT-S  Take 1 tablet by mouth daily for 14 days        CONTINUE taking these medications    acetaminophen 500 MG tablet  Commonly known as: APAP Extra Strength  Take 2 tablets by mouth every 6 hours as needed for Pain     albuterol sulfate  (90 Base) MCG/ACT inhaler  Inhale 2 puffs into the lungs every 6 hours as needed for Wheezing or Shortness of Breath     ferrous sulfate 325 (65 Fe) MG tablet  Commonly known as: IRON 325  Take 1 tablet by mouth 2 times daily     * levothyroxine 100 MCG tablet  Commonly known as: SYNTHROID  take 1 tablet by mouth once daily     * levothyroxine 25 MCG tablet  Commonly known as: SYNTHROID  Take 1 tablet by mouth daily     PRENATAL VITAMINS PLUS PO     True Metrix Meter w/Device Kit         * This list has 2 medication(s) that are the same as other medications prescribed for you. Read the directions carefully, and ask your doctor or other care provider to review them with you. STOP taking these medications    aspirin EC 81 MG EC tablet     BreatheRite Valved MDI Chamber Cris     RA Alcohol Swabs 70 % Pads     True Metrix Blood Glucose Test strip  Generic drug: blood glucose test strips     TRUEplus Lancets 33G Misc     ursodiol 300 MG capsule  Commonly known as: ACTIGALL           Where to Get Your Medications      You can get these medications from any pharmacy    Bring a paper prescription for each of these medications  · ibuprofen 600 MG tablet  · ondansetron 4 MG disintegrating tablet  · oxyCODONE-acetaminophen 5-325 MG per tablet  · sennosides-docusate sodium 8.6-50 MG tablet       Activity: pelvic rest x 6 weeks, no driving  narcotics, no lifting greater than 15 lbs  Diet: regular diet  Follow up: 1 week for BP check and Prevena dressing removal    Condition on discharge: stable    Discharge date: 10/13/21    Ulysses Roots, DO  Ob/Gyn Resident    Comments:  Home care and follow-up care were reviewed. Pelvic rest, and birth control were reviewed. Signs and symptoms of mastitis and post partum depression were reviewed. The patient is to notify her physician if any of these occur. The patient was counseled on secondary smoke risks and the increased risk of sudden infant death syndrome and respiratory problems to her baby with exposure. She was counseled on various alternate recommendations to decrease the exposure to secondary smoke to her children.

## 2021-10-11 NOTE — CARE COORDINATION
ANTEPARTUM NOTE    36 weeks gestation of pregnancy [Z3A.36]    Diego Vásquez was admitted to L&D on 10/11/2021 for Scheduled Repeat  Section with Bilateral Saplingectomy under General anesthesia  @ 36w1d    Will meet with patient after delivery to verify name/address/phone/insurance and discuss discharge planning.
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
## 2021-10-11 NOTE — L&D DELIVERY NOTE
Mother's Information    Labor Events     labor?: Yes     Mother Delivery Information    Episiotomy: None  Lacerations: None  Surgical or Additional Est. Blood Loss (mL): 0 (View Only): Edit in Flowsheets   Combined Est. Blood Loss (mL): 0        Concepcion, Baby Boy Alex Salmon [[id number]]    Labor Events     labor?: Yes   steroids?: Full Course  Cervical ripening date/time:     Antibiotics received during labor?: No  Rupture Identifier: Sac 1   Rupture date/time: 10/11/21 14:20:00   Rupture type: Artificial=AROM  Fluid color: Clear  Fluid odor: None  Induction: None  Augmentation: None  Labor complications: None          Anesthesia    Method: General     Assisted Delivery Details    Forceps attempted?: No  Vacuum extractor attempted?: No     Document Additional Attempt       Document Additional Attempt             Shoulder Dystocia    Shoulder dystocia present?: No  Add Second Maneuver  Add Third Maneuver  Add Fourth Maneuver  Add Fifth Maneuver  Add Sixth Maneuver  Add Seventh Maneuver  Add Eighth Maneuver  Add Ninth Maneuver      Presentation    Presentation: Vertex  Position: Left  _: Occiput  _: Anterior      Information    Head delivery date/time: 10/11/2021 14:20:00   Changing the 's delivery date/time could affect patient care.:    Delivery date/time:  10/11/21 1420   Delivery type: , Low Transverse    Details:  Trial of labor?: No    categorization: Repeat    priority: Scheduled   Indications for : Prior Uterine Surgery   Skin Incision Type: Pfannenstiel   Uterine Incision: Low Transverse         Delivery Providers    Delivering clinician: Jackelin Fleming DO   Provider Role    Renita Tavares DO Resident      Cord    Vessels: 3 Vessels  Complications: None  Delayed cord clamping?: No  Cord clamped date/time: 10/11/2021 1420  Cord blood disposition: Lab  Gases sent?: Yes  Stem cell collection (by provider):  No Placenta    Date/time: 10/11/2021 14:22:00  Removal: Spontaneous  Appearance: Intact  Disposition: Pathology     Delivery Resuscitation    Method: Bulb Suction, Stimulation     Apgars    Living status: Living  Apgars   1 Minute:  5 Minute:  10 Minute 15 Minute 20 Minute   Skin Color: 0  0       Heart Rate: 2  2       Reflex Irritability: 2  2       Muscle Tone: 2  2       Respiratory Effort: 2  2       Total: 8  8               Apgars Assigned By: NICU     Skin to Skin    Skin to skin initiation date/time:     Skin to skin end date/time:     Reason skin to skin not initiated:  Maternal Acuity      Measurements    Weight: 3215 g Length: 48.3 cm      Delivery Information    Episiotomy: None  Perineal lacerations: None    Surgical or additional est. blood loss (mL): 0 (View Only): Edit in Flowsheets   Combined est. blood loss (mL): 0     Other Procedures    Procedures: Bilateral Ovarian Cancer Risk Reducing Salpingectomy

## 2021-10-11 NOTE — ANESTHESIA POSTPROCEDURE EVALUATION
Department of Anesthesiology  Postprocedure Note    Patient: Cristobal Villa  MRN: [id number]  [place]: 1990  Date of evaluation: 10/11/2021  Time:  6:32 PM     Procedure Summary     Date: 10/11/21 Room / Location: [address]    Anesthesia Start: 1400 Anesthesia Stop: 8538    Procedures:        SECTION (N/A )      SALPINGECTOMY (Bilateral ) Diagnosis: (36.5 wk IUP for repeat  c/s)    Surgeons: Loetta Baumgarten, DO Responsible Provider: Clementina Chan MD    Anesthesia Type: general ASA Status: Not recorded          Anesthesia Type: general    Caridad Phase I: Caridad Score: 10    Caridad Phase II:      Last vitals: Reviewed and per EMR flowsheets.        Anesthesia Post Evaluation    Patient location during evaluation: PACU  Patient participation: complete - patient participated  Level of consciousness: awake and alert  Pain score: 5  Airway patency: patent  Nausea & Vomiting: no nausea and no vomiting  Complications: no  Cardiovascular status: hemodynamically stable  Respiratory status: acceptable, spontaneous ventilation and room air  Hydration status: euvolemic

## 2021-10-12 PROBLEM — Z3A.36 36 WEEKS GESTATION OF PREGNANCY: Status: RESOLVED | Noted: 2021-10-11 | Resolved: 2021-10-12

## 2021-10-12 LAB
EKG ATRIAL RATE: 54 BPM
EKG P AXIS: 39 DEGREES
EKG P-R INTERVAL: 118 MS
EKG Q-T INTERVAL: 420 MS
EKG QRS DURATION: 88 MS
EKG QTC CALCULATION (BAZETT): 398 MS
EKG R AXIS: 34 DEGREES
EKG T AXIS: 19 DEGREES
EKG VENTRICULAR RATE: 54 BPM
GLUCOSE BLD-MCNC: 79 MG/DL (ref 65–105)
GLUCOSE FASTING: 102 MG/DL (ref 70–99)
HCT VFR BLD CALC: 33.7 % (ref 36.3–47.1)
HEMOGLOBIN: 10 G/DL (ref 11.9–15.1)

## 2021-10-12 PROCEDURE — 2580000003 HC RX 258

## 2021-10-12 PROCEDURE — 1220000000 HC SEMI PRIVATE OB R&B

## 2021-10-12 PROCEDURE — 36415 COLL VENOUS BLD VENIPUNCTURE: CPT

## 2021-10-12 PROCEDURE — 6370000000 HC RX 637 (ALT 250 FOR IP)

## 2021-10-12 PROCEDURE — 82947 ASSAY GLUCOSE BLOOD QUANT: CPT

## 2021-10-12 PROCEDURE — 6360000002 HC RX W HCPCS

## 2021-10-12 PROCEDURE — 85018 HEMOGLOBIN: CPT

## 2021-10-12 PROCEDURE — 2580000003 HC RX 258: Performed by: STUDENT IN AN ORGANIZED HEALTH CARE EDUCATION/TRAINING PROGRAM

## 2021-10-12 PROCEDURE — 85014 HEMATOCRIT: CPT

## 2021-10-12 RX ADMIN — SODIUM CHLORIDE, PRESERVATIVE FREE 10 ML: 5 INJECTION INTRAVENOUS at 09:29

## 2021-10-12 RX ADMIN — KETOROLAC TROMETHAMINE 30 MG: 30 INJECTION, SOLUTION INTRAMUSCULAR; INTRAVENOUS at 04:53

## 2021-10-12 RX ADMIN — DOCUSATE SODIUM 100 MG: 100 CAPSULE ORAL at 20:49

## 2021-10-12 RX ADMIN — SODIUM CHLORIDE 500 ML: 9 INJECTION, SOLUTION INTRAVENOUS at 00:07

## 2021-10-12 RX ADMIN — IBUPROFEN 600 MG: 600 TABLET, FILM COATED ORAL at 11:46

## 2021-10-12 RX ADMIN — DEXTROSE MONOHYDRATE 2000 MG: 50 INJECTION, SOLUTION INTRAVENOUS at 06:09

## 2021-10-12 RX ADMIN — OXYCODONE HYDROCHLORIDE AND ACETAMINOPHEN 2 TABLET: 5; 325 TABLET ORAL at 16:23

## 2021-10-12 RX ADMIN — HYDROMORPHONE HYDROCHLORIDE 1 MG: 1 INJECTION, SOLUTION INTRAMUSCULAR; INTRAVENOUS; SUBCUTANEOUS at 06:06

## 2021-10-12 RX ADMIN — OXYCODONE HYDROCHLORIDE AND ACETAMINOPHEN 2 TABLET: 5; 325 TABLET ORAL at 11:46

## 2021-10-12 RX ADMIN — SIMETHICONE 80 MG: 80 TABLET, CHEWABLE ORAL at 20:49

## 2021-10-12 RX ADMIN — OXYCODONE HYDROCHLORIDE AND ACETAMINOPHEN 2 TABLET: 5; 325 TABLET ORAL at 20:49

## 2021-10-12 RX ADMIN — DOCUSATE SODIUM 100 MG: 100 CAPSULE ORAL at 09:29

## 2021-10-12 RX ADMIN — IBUPROFEN 600 MG: 600 TABLET, FILM COATED ORAL at 17:50

## 2021-10-12 RX ADMIN — Medication 1 TABLET: at 09:29

## 2021-10-12 RX ADMIN — SODIUM CHLORIDE: 9 INJECTION, SOLUTION INTRAVENOUS at 04:26

## 2021-10-12 ASSESSMENT — PAIN DESCRIPTION - PAIN TYPE: TYPE: SURGICAL PAIN

## 2021-10-12 ASSESSMENT — PAIN SCALES - GENERAL
PAINLEVEL_OUTOF10: 7
PAINLEVEL_OUTOF10: 4
PAINLEVEL_OUTOF10: 5
PAINLEVEL_OUTOF10: 3
PAINLEVEL_OUTOF10: 8
PAINLEVEL_OUTOF10: 3

## 2021-10-12 ASSESSMENT — PAIN DESCRIPTION - LOCATION: LOCATION: INCISION

## 2021-10-12 NOTE — FLOWSHEET NOTE
Patient encouraged to breast feed infant every 3 hours. Patient instructed regarding symptoms of low blood sugar and the need to notify nursing staff if infant has symptoms. Patient verbalized understanding.

## 2021-10-12 NOTE — CARE COORDINATION
POST-PARTUM TRANSITIONAL CARE PLAN    36 weeks gestation of pregnancy [Z3A.36]    Writer met w/ Louise Pisano and Taylor Escobedo at bedside to discuss DCP. She is S/P C/S on 10/11/2021 @ 1420 at 36w1d of Male    Infant name on BC: Reford Askew. Infant to WIN. Infant PCP Shirlene Waters MD.     FOB: Praneeth Villareal [phone]    Writer verified name/address/phone number correct on facesheet    [place] [employer] insurance correct. Writer notified Louise Pisano and Taylor Escobedo they have 30 days from date of birth to add  to insurance policy. They verbalized understanding. No previous home care or dme. Anticipate DC of couplet 10/15/2021    CM continue to follow for any DC needs.

## 2021-10-12 NOTE — FLOWSHEET NOTE
Pt stating she doesn't feel well and is dizzy and light headed. Dr. Rosie Garza notified. Pt took her glucose reading on her machine and received a reading of 145. HR is reading in the upper 40's to the 70's. Dr. Rosie Garza over to look at the pt and EKG obtained.

## 2021-10-12 NOTE — PROGRESS NOTES
POST OPERATIVE DAY # 1     Yancey Sandhoff is a 27 y.o. female   This patient was seen and examined today. RLTCS w/ RRS (general) on 10/11/21   Today she is doing well and has been able to get up and ambulate and shower as well. Her lochia is light. She denies chest pain, shortness of breath, headache, lightheadedness, blurred vision and peripheral edema. Flatus present. Bowel movement absent. She is tolerating solids.     Vital Signs:  BP: 109/58  HR: 85  Temp 97.9  Resp: 16     Physical Exam:  General:  no apparent distress, alert and cooperative  Abdomen: abdomen soft, non-distended, non-tender  Fundus: non-tender, firm, below umbilicus  Incision: clean, dry and Prevena in place  Extremities:  no calf tenderness, non edematous           Assessment/Plan:  1.  Yancey Sandhoff is a [id number] POD # 1 s/p RLTCS w/ RRS (general)        - Doing well, VSS                - male infant     - prevena in place    Increase diet and ambulation as tolerated

## 2021-10-12 NOTE — PROGRESS NOTES
POST OPERATIVE DAY # 1    Yancey Sandhoff is a 27 y.o. female   This patient was seen and examined today. RLTCS w/ RRS (general) on 10/11/21    Her pregnancy was complicated by:   Patient Active Problem List   Diagnosis    Celestone 10/8 & 10/9    Reactive airway disease    Allergic sinusitis    Psoriasis    Neck pain    Hypothyroidism    Chronic fatigue    Seasonal allergic rhinitis due to pollen    Anti-RNP antibodies present    Positive TEO (antinuclear antibody)    Lumbar disc herniation    Lumbar radiculopathy    Prediabetes    Lumbar spondylosis    Spondylolisthesis at L4-L5 level    Right ovarian cyst    Class 2 obesity due to excess calories without serious comorbidity with body mass index (BMI) of 35.0 to 35.9 in adult    Postauricular adenopathy    Sacroiliitis, not elsewhere classified (HCC)    Dizziness    POTS (postural orthostatic tachycardia syndrome)    CSF leak    Cyst of spinal meninges    Double vision    Hemicrania continua    H/O:       (vaginal birth after ) X 2 ( and )    Family history of congenital heart defect    Family history of Down syndrome    Family history of diabetes mellitus in father    History of Chiari malformation    History of back surgery    Tarlov cysts    Pregestational Diabetes    cHTN (no meds)    Low-lying placenta-rslvd    Gestational diabetes    Elevated liver enzymes, bile salts pending 2021    Intrahepatic cholestasis of pregnancy    Polyhydramnios    High-risk pregnancy, unspecified trimester    RLTCS w/ RRS (gen) 10/11/21 M Apg  Wt 7#1    Patient-requested procedure    BMI 35.0-35.9,adult       Today she is doing well, she is complaining of dizziness and nausea. Patient given Zofran and a 500 ml NS bolus. Her lochia is light. She denies chest pain, shortness of breath, headache, lightheadedness, blurred vision and peripheral edema.  She is breast feeding and she denies any signs or symptoms of mastitis. She is not yet ambulating, patient still has enrique catheter in place and has not yet gotten out of bed. Stressed the importance of getting up and moving, and patient understands. She is voiding via enrique catheter without difficulty. She currently denies S/S of postpartum depression. Flatus present. Bowel movement absent. She is tolerating solids. Vital Signs:  Vitals:    10/11/21 1706 10/11/21 1737 10/11/21 1800 10/11/21 2017   BP: 137/79 (!) 122/47 113/72 120/77   Pulse: 68 68 58 72   Resp: 16 20 18 18   Temp:   97.6 °F (36.4 °C) 97.4 °F (36.3 °C)   TempSrc:    Oral   SpO2: 100% 97% 99% 98%   Weight:       Height:             Urine Input & Output last 24hrs:     Intake/Output Summary (Last 24 hours) at 10/12/2021 0049  Last data filed at 10/11/2021 1739  Gross per 24 hour   Intake 1600 ml   Output 925 ml   Net 675 ml       Physical Exam:  General:  no apparent distress, alert and cooperative  Neurologic:  alert, oriented, normal speech, no focal findings or movement disorder noted  Lungs:  No increased work of breathing, good air exchange, clear to auscultation bilaterally, no crackles or wheezing  Heart:  Regular rate and rhythm, normal S1 and S2, no S3 or S4, and no murmur noted    Abdomen: abdomen soft, non-distended, non-tender  Fundus: non-tender, firm, below umbilicus  Incision: clean, dry and Prevena in place  Extremities:  no calf tenderness, non edematous    Labs:  Lab Results   Component Value Date    WBC 7.7 10/11/2021    HGB 11.3 (L) 10/11/2021    HCT 36.5 10/11/2021    MCV 89.0 10/11/2021     10/11/2021       Assessment/Plan:  1.  Libia Elizabeth is a [id number] POD # 1 s/p RLTCS w/ RRS (general)   - Doing well, VSS   - male infant in [address], circumcision desired   - Encourage ambulation and use of incentive spirometer   - D/C enrique catheter and saline lock IV on POD #1    - H&H this am   - Dilaudid pushes and Toradol for pain control   - Tap block done post procedure 2/2 no Duramorph   - Will transition to motrin/percocet when appropriate    - Received TXA x 1 intraop, bleeding stable   2. Rh positive/Rubella immune  3. Breast feeding   - Denies s/s of mastitis   4. Intrahepatic cholestasis of pregnancy  - Patient denies itching at this time  5. Hx CSF leak, cyst of spinal meninges, lumbar disc herniation and chiari malformatino w/ surgery  - Patient was delivered under general anesthesia d/t her history  6. Reactive airway disease  - VSS  - Clinically asymptomatic   7. POTS  - Patient noted to be bradycardic   - EKG showed Normal rhythm with bradycardia   - Patient given 500 ml NS bolus   - Clinically asymptomatic   8. BMI 35  - Prevena in place   - Ancef x 24 hours  9. Continue post-op care. Counseling Completed:  Secondary Smoke risks and Sudden Infant Death Syndrome were reviewed with recommendations. Infant sleeping, \"back to sleep\" and avoidance of co-sleeping recommendations were reviewed. Signs and Symptoms of Post Partum Depression were reviewed. The patient is to call if any occur. Signs and symptoms of Mastitis were reviewed. The patient is to call if any occur for follow up.   Discharge instructions including pelvic rest, incision care, 15 lb weight restriction, no driving with pain medicine and office follow-up were reviewed with patient     Attending Physician: Dr. Marylu King DO  Ob/Gyn Resident  10/12/2021, 12:49 AM

## 2021-10-12 NOTE — CARE COORDINATION
Social Work     Sw reviewed medical record (current active problem list) and tox screens and found no concerns. Sw spoke with mom briefly to explain Sw role, inquire if any needs or concerns, and provide safe sleep education and discuss. Mom denied any needs or questions and informs baby has a safe sleep environment (mansi and sarthak). Mom denied any current s/s of anxiety or depression and is aware to reach out to OB if any s/s occur after dc. Mom reports a phenomenal support system with fob, her siblings, and in laws, and denied any current questions or needs. Mom reports this is their 5th child (ages 21years old to [facility]) and baby will go to family physician at [address] [place] [address]. Sw encouraged mom to reach out if any issues or concerns arise.

## 2021-10-12 NOTE — CONSULTS
Mom complaining of nipple soreness and that baby isn't getting on deep enough. Reviewed positioning and how to get baby to latch more deeply. Stressed that d/t gestation, baby needs to eat every three hours and to watch for signs that baby is tiring at breast.  Discussed introducing the breast pump if it becomes necessary. Education booklet given and purelan and soothies for nipple soreness.

## 2021-10-13 VITALS
TEMPERATURE: 98.2 F | WEIGHT: 244 LBS | OXYGEN SATURATION: 95 % | HEIGHT: 70 IN | BODY MASS INDEX: 34.93 KG/M2 | SYSTOLIC BLOOD PRESSURE: 112 MMHG | DIASTOLIC BLOOD PRESSURE: 74 MMHG | HEART RATE: 86 BPM | RESPIRATION RATE: 18 BRPM

## 2021-10-13 LAB — SURGICAL PATHOLOGY REPORT: NORMAL

## 2021-10-13 PROCEDURE — 6370000000 HC RX 637 (ALT 250 FOR IP)

## 2021-10-13 RX ADMIN — DOCUSATE SODIUM 100 MG: 100 CAPSULE ORAL at 09:16

## 2021-10-13 RX ADMIN — OXYCODONE HYDROCHLORIDE AND ACETAMINOPHEN 1 TABLET: 5; 325 TABLET ORAL at 05:53

## 2021-10-13 RX ADMIN — IBUPROFEN 600 MG: 600 TABLET, FILM COATED ORAL at 00:33

## 2021-10-13 RX ADMIN — Medication 1 TABLET: at 09:16

## 2021-10-13 RX ADMIN — OXYCODONE HYDROCHLORIDE AND ACETAMINOPHEN 1 TABLET: 5; 325 TABLET ORAL at 00:53

## 2021-10-13 RX ADMIN — IBUPROFEN 600 MG: 600 TABLET, FILM COATED ORAL at 13:38

## 2021-10-13 RX ADMIN — OXYCODONE HYDROCHLORIDE AND ACETAMINOPHEN 2 TABLET: 5; 325 TABLET ORAL at 13:39

## 2021-10-13 RX ADMIN — IBUPROFEN 600 MG: 600 TABLET, FILM COATED ORAL at 05:53

## 2021-10-13 ASSESSMENT — PAIN SCALES - GENERAL
PAINLEVEL_OUTOF10: 5
PAINLEVEL_OUTOF10: 2
PAINLEVEL_OUTOF10: 5
PAINLEVEL_OUTOF10: 7
PAINLEVEL_OUTOF10: 4

## 2021-10-13 NOTE — PROGRESS NOTES
CLINICAL PHARMACY NOTE: MEDS TO BEDS    Total # of Prescriptions Filled: 4   The following medications were delivered to the patient:  · Percocet 5-325mg  · Ibuprofen 600mg  · Senokot 8.6-50mg  · Ondansetron ODT 4mg    Additional Documentation: delivered to patients  in room 749 10/13 at 11:53am. No co-pay.

## 2021-10-13 NOTE — PROGRESS NOTES
POST OPERATIVE DAY # 2    Vince Henley is a 27 y.o. female   This patient was seen and examined today. Her pregnancy was complicated by:   Patient Active Problem List   Diagnosis    Celestone 10/8 & 10/9    Reactive airway disease    Allergic sinusitis    Psoriasis    Neck pain    Hypothyroidism    Chronic fatigue    Seasonal allergic rhinitis due to pollen    Anti-RNP antibodies present    Positive TEO (antinuclear antibody)    Lumbar disc herniation    Lumbar radiculopathy    Prediabetes    Lumbar spondylosis    Spondylolisthesis at L4-L5 level    Right ovarian cyst    Class 2 obesity due to excess calories without serious comorbidity with body mass index (BMI) of 35.0 to 35.9 in adult    Postauricular adenopathy    Sacroiliitis, not elsewhere classified (HCC)    Dizziness    POTS (postural orthostatic tachycardia syndrome)    CSF leak    Cyst of spinal meninges    Double vision    Hemicrania continua    H/O:       (vaginal birth after ) X 2 ( and )    Family history of congenital heart defect    Family history of Down syndrome    Family history of diabetes mellitus in father    History of Chiari malformation    History of back surgery    Tarlov cysts    Pregestational Diabetes    cHTN (no meds)    Low-lying placenta-rslvd    Gestational diabetes    Elevated liver enzymes, bile salts pending 2021    Intrahepatic cholestasis of pregnancy    Polyhydramnios    High-risk pregnancy, unspecified trimester    RLTCS w/ RRS (gen) 10/11/ M Apg  Wt 7#1    Patient-requested procedure    BMI 35.0-35.9,adult       Today she is doing well without any chief complaint. Her lochia is light. She denies chest pain, shortness of breath, lightheadedness, blurred vision and peripheral edema. Mild HA that patient states is her baseline with her history of Chiari malformation.  She is  Breast and bottle feeding and she denies any signs or symptoms of mastitis. She is ambulating well. She is voiding without difficulty. She currently denies S/S of postpartum depression. Flatus present. Bowel movement absent. She is tolerating solids. Vital Signs:  Vitals:    10/12/21 0849 10/12/21 1600 10/12/21 2030 10/13/21 0033   BP:  (!) 109/58 127/81 113/73   Pulse: 70 85 92 67   Resp:  18 20 16   Temp:  97.9 °F (36.6 °C) 97.5 °F (36.4 °C) 97.3 °F (36.3 °C)   TempSrc:  Oral Oral Oral   SpO2:  98% 98% 97%   Weight:       Height:         Urine Input & Output last 24hrs:     Intake/Output Summary (Last 24 hours) at 10/13/2021 7573  Last data filed at 10/12/2021 1151  Gross per 24 hour   Intake    Output 1650 ml   Net -1650 ml     Physical Exam:  General:  no apparent distress, alert and cooperative  Neurologic:  alert, oriented, normal speech, no focal findings or movement disorder noted  Lungs:  No increased work of breathing, good air exchange, clear to auscultation bilaterally, no crackles or wheezing  Heart:  regular rate and rhythm    Abdomen: abdomen soft, non-distended, non-tender, +BS  Fundus: non-tender, normal size, firm, below umbilicus  Incision: prevena dressing in place, functioning well  Extremities:  no calf tenderness, non edematous    Labs:  Lab Results   Component Value Date    WBC 7.7 10/11/2021    HGB 10.0 (L) 10/12/2021    HCT 33.7 (L) 10/12/2021    MCV 89.0 10/11/2021     10/11/2021       Assessment/Plan:  1. Edwin Mujica is a [id number] POD # 2 s/p RLTCS w/ RRS (gen)   - Doing well, VSS    - male infant in [address], circumcision done   - Encourage ambulation and use of incentive spirometer   - H&Hcompleted, Hgb 10   - Motrin/Percocet for pain control  2. Rh positive/Rubella immune   - Rhogam not indicated  3. Breast and bottle feeding    - Denies s/s of mastitis   4. Pregestational DM   - Not on meds   - FBS on 10/12 was 102  5.  cHTN (no meds)   - BPs normotensive overnight   - Denies s/s of PreE at this time   - PreE labs wnl, P/C 0.12 (10/11)   6. Reactive airway disease   - Clinically asymptomatic at this time   - Lungs clear b/l, no respiratory distress noted  7. Hx CSF leak, cyst of spinal meninges, lumbar disc herniation, chiari malformation   - CS performed under general anesthesia   - Mild HA baseline HA that patient states is relieved with scheduled pain medicine  8. POTS   - Clinically asymptomatic at this time   - Bradycardia resolved  9. BMI 35   - Prevena dressing in place, functioning    - S/p ancef x24 hrs  10. Continue post-op care. Counseling Completed:  Secondary Smoke risks and Sudden Infant Death Syndrome were reviewed with recommendations. Infant sleeping, \"back to sleep\" and avoidance of co-sleeping recommendations were reviewed. Signs and Symptoms of Post Partum Depression were reviewed. The patient is to call if any occur. Signs and symptoms of Mastitis were reviewed. The patient is to call if any occur for follow up.   Discharge instructions including pelvic rest, incision care, 15 lb weight restriction, no driving with pain medicine and office follow-up were reviewed with patient     Attending Physician: Dr. Dayton Charlton MD  Ob/Gyn Resident  10/13/2021, 6:38 AM

## 2021-10-13 NOTE — PLAN OF CARE
Problem: Pain:  Goal: Pain level will decrease  Description: Pain level will decrease  Outcome: Completed  Goal: Control of acute pain  Description: Control of acute pain  Outcome: Completed  Goal: Control of chronic pain  Description: Control of chronic pain  Outcome: Completed     Problem: Discharge Planning:  Goal: Discharged to appropriate level of care  Description: Discharged to appropriate level of care  Outcome: Completed     Problem: Fluid Volume - Imbalance:  Goal: Absence of postpartum hemorrhage signs and symptoms  Description: Absence of postpartum hemorrhage signs and symptoms  Outcome: Completed  Goal: Absence of imbalanced fluid volume signs and symptoms  Description: Absence of imbalanced fluid volume signs and symptoms  Outcome: Completed     Problem: Infection - Surgical Site:  Goal: Will show no infection signs and symptoms  Description: Will show no infection signs and symptoms  Outcome: Completed     Problem: Mood - Altered:  Goal: Mood stable  Description: Mood stable  Outcome: Completed     Problem: Nausea/Vomiting:  Goal: Absence of nausea/vomiting  Description: Absence of nausea/vomiting  Outcome: Completed     Problem: Pain - Acute:  Goal: Pain level will decrease  Description: Pain level will decrease  Outcome: Completed     Problem: Urinary Retention:  Goal: Urinary elimination within specified parameters  Description: Urinary elimination within specified parameters  Outcome: Completed     Problem: Venous Thromboembolism:  Goal: Absence of signs or symptoms of impaired coagulation  Description: Absence of signs or symptoms of impaired coagulation  Outcome: Completed
Problem: Pain:  Goal: Pain level will decrease  Description: Pain level will decrease  Outcome: Ongoing  Goal: Control of acute pain  Description: Control of acute pain  Outcome: Ongoing  Goal: Control of chronic pain  Description: Control of chronic pain  Outcome: Ongoing     Problem: Discharge Planning:  Goal: Discharged to appropriate level of care  Description: Discharged to appropriate level of care  Outcome: Ongoing     Problem: Fluid Volume - Imbalance:  Goal: Absence of postpartum hemorrhage signs and symptoms  Description: Absence of postpartum hemorrhage signs and symptoms  Outcome: Ongoing  Goal: Absence of imbalanced fluid volume signs and symptoms  Description: Absence of imbalanced fluid volume signs and symptoms  Outcome: Ongoing     Problem: Infection - Surgical Site:  Goal: Will show no infection signs and symptoms  Description: Will show no infection signs and symptoms  Outcome: Ongoing     Problem: Mood - Altered:  Goal: Mood stable  Description: Mood stable  Outcome: Ongoing     Problem: Nausea/Vomiting:  Goal: Absence of nausea/vomiting  Description: Absence of nausea/vomiting  Outcome: Ongoing     Problem: Pain - Acute:  Goal: Pain level will decrease  Description: Pain level will decrease  Outcome: Ongoing     Problem: Urinary Retention:  Goal: Urinary elimination within specified parameters  Description: Urinary elimination within specified parameters  Outcome: Ongoing     Problem: Venous Thromboembolism:  Goal: Will show no signs or symptoms of venous thromboembolism  Description: Will show no signs or symptoms of venous thromboembolism  10/12/2021 0749 by Maria Del Carmen Reid RN  Outcome: Completed  Goal: Absence of signs or symptoms of impaired coagulation  Description: Absence of signs or symptoms of impaired coagulation  Outcome: Ongoing
specified parameters  Description: Urinary elimination within specified parameters  Outcome: Ongoing     Problem: Venous Thromboembolism:  Goal: Will show no signs or symptoms of venous thromboembolism  Description: Will show no signs or symptoms of venous thromboembolism  Outcome: Ongoing  Goal: Absence of signs or symptoms of impaired coagulation  Description: Absence of signs or symptoms of impaired coagulation  Outcome: Ongoing

## 2021-10-13 NOTE — FLOWSHEET NOTE
Pumping Initiated at 2030    Initiated due to    []   Baby in NICU   []   Plans exclusive pumping   []   Infant weight loss(supplement)   [x]   Baby not latching well    Flange Size    Right:   Left:     [x]   24    [x]   24     []   27    []   27     []   30    []   30     []   36    []   36  Instructions   [x]   Verbal instructions on how to setup pump and how to use initiation phase   [x]   Written sheet\" How to keep your breast pump kit clean\"   [x]   Expectation sheet for Breastfeeding mothers with pumping log   [x]   Frequency of pumping   []   Collection,labeling and storage of colostrum and milk    Supplies Provided   [x]   Pump initiation kit   [x]   Cleaning supplies (basin and soap)   [x]   Additional flange size   []   Oral syringes/snappies   []   Patient labels    Patient plans to feed pumped milk directly to infant. Nipples and additional bottles supplied. No labels needed at this time. Will supply oral syringes as needed.

## 2021-10-13 NOTE — LACTATION NOTE
Pt trying to latch , denies needs or assistance. Encouraged a deep latch. Pt states she started using a nipple shield on one side that was given to her last night. Encouraged pt to try skin to skin as  is fussy and latch when calm. Pt states, \"he won't latch when he is sleepy or quiet, he has to be awake and seem mad. \" Encouraged repositioning to achieve a deeper latch. Right nipple is red and skin around nipple.

## 2021-10-19 ENCOUNTER — OFFICE VISIT (OUTPATIENT)
Dept: OBGYN CLINIC | Age: 31
End: 2021-10-19

## 2021-10-19 VITALS
BODY MASS INDEX: 32.35 KG/M2 | DIASTOLIC BLOOD PRESSURE: 76 MMHG | SYSTOLIC BLOOD PRESSURE: 112 MMHG | WEIGHT: 226 LBS | HEART RATE: 99 BPM | HEIGHT: 70 IN

## 2021-10-19 DIAGNOSIS — Z09 POSTOP CHECK: Primary | ICD-10-CM

## 2021-10-19 PROCEDURE — 99024 POSTOP FOLLOW-UP VISIT: CPT | Performed by: NURSE PRACTITIONER

## 2021-10-19 NOTE — PROGRESS NOTES
Per Chencho Kauffman NP, pt harsha dressing removed. No redness, odor, or drainage noted. Pt tolerated procedure well. Pt instructed to call if any of those occur and continue to use chlorohexidine soap until its gone, and make sure incision is dry after shower. To make f/u appt.

## 2021-10-25 ENCOUNTER — OFFICE VISIT (OUTPATIENT)
Dept: OBGYN CLINIC | Age: 31
End: 2021-10-25
Payer: MEDICARE

## 2021-10-25 VITALS
SYSTOLIC BLOOD PRESSURE: 124 MMHG | BODY MASS INDEX: 31.92 KG/M2 | HEIGHT: 70 IN | WEIGHT: 223 LBS | DIASTOLIC BLOOD PRESSURE: 78 MMHG

## 2021-10-25 PROBLEM — O09.90 HIGH-RISK PREGNANCY, UNSPECIFIED TRIMESTER: Status: RESOLVED | Noted: 2021-10-10 | Resolved: 2021-10-25

## 2021-10-25 PROBLEM — R74.8 ELEVATED LIVER ENZYMES: Status: RESOLVED | Noted: 2021-09-20 | Resolved: 2021-10-25

## 2021-10-25 PROBLEM — O44.40 LOW-LYING PLACENTA: Status: RESOLVED | Noted: 2021-06-24 | Resolved: 2021-10-25

## 2021-10-25 PROBLEM — O24.419 GESTATIONAL DIABETES: Status: RESOLVED | Noted: 2021-07-06 | Resolved: 2021-10-25

## 2021-10-25 PROBLEM — O26.619 INTRAHEPATIC CHOLESTASIS OF PREGNANCY: Status: RESOLVED | Noted: 2021-09-22 | Resolved: 2021-10-25

## 2021-10-25 PROBLEM — O40.9XX0 POLYHYDRAMNIOS: Status: RESOLVED | Noted: 2021-09-29 | Resolved: 2021-10-25

## 2021-10-25 PROBLEM — K83.1 INTRAHEPATIC CHOLESTASIS OF PREGNANCY: Status: RESOLVED | Noted: 2021-09-22 | Resolved: 2021-10-25

## 2021-10-25 PROBLEM — O26.649 INTRAHEPATIC CHOLESTASIS OF PREGNANCY: Status: RESOLVED | Noted: 2021-09-22 | Resolved: 2021-10-25

## 2021-10-25 PROCEDURE — G8484 FLU IMMUNIZE NO ADMIN: HCPCS | Performed by: NURSE PRACTITIONER

## 2021-10-25 PROCEDURE — 1036F TOBACCO NON-USER: CPT | Performed by: NURSE PRACTITIONER

## 2021-10-25 PROCEDURE — 99213 OFFICE O/P EST LOW 20 MIN: CPT | Performed by: NURSE PRACTITIONER

## 2021-10-25 PROCEDURE — 1111F DSCHRG MED/CURRENT MED MERGE: CPT | Performed by: NURSE PRACTITIONER

## 2021-10-25 PROCEDURE — G8427 DOCREV CUR MEDS BY ELIG CLIN: HCPCS | Performed by: NURSE PRACTITIONER

## 2021-10-25 PROCEDURE — G8417 CALC BMI ABV UP PARAM F/U: HCPCS | Performed by: NURSE PRACTITIONER
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
## 2021-10-27 VITALS
TEMPERATURE: 98.2 F | OXYGEN SATURATION: 99 % | DIASTOLIC BLOOD PRESSURE: 88 MMHG | BODY MASS INDEX: 31.85 KG/M2 | WEIGHT: 222 LBS | HEART RATE: 92 BPM | RESPIRATION RATE: 18 BRPM | SYSTOLIC BLOOD PRESSURE: 124 MMHG

## 2021-10-27 PROCEDURE — 96376 TX/PRO/DX INJ SAME DRUG ADON: CPT

## 2021-10-27 PROCEDURE — 96374 THER/PROPH/DIAG INJ IV PUSH: CPT

## 2021-10-27 PROCEDURE — 99284 EMERGENCY DEPT VISIT MOD MDM: CPT

## 2021-10-27 ASSESSMENT — PAIN SCALES - GENERAL: PAINLEVEL_OUTOF10: 10

## 2021-10-28 ENCOUNTER — HOSPITAL ENCOUNTER (EMERGENCY)
Age: 31
Discharge: HOME | End: 2021-10-28
Attending: EMERGENCY MEDICINE
Payer: MEDICARE

## 2021-10-28 ENCOUNTER — APPOINTMENT (OUTPATIENT)
Dept: CT IMAGING | Age: 31
End: 2021-10-28
Payer: MEDICARE

## 2021-10-28 DIAGNOSIS — R10.13 EPIGASTRIC PAIN: Primary | ICD-10-CM

## 2021-10-28 LAB
ABSOLUTE EOS #: 0.28 K/UL (ref 0–0.44)
ABSOLUTE IMMATURE GRANULOCYTE: <0.03 K/UL (ref 0–0.3)
ABSOLUTE LYMPH #: 2.68 K/UL (ref 1.1–3.7)
ABSOLUTE MONO #: 0.55 K/UL (ref 0.1–1.2)
ALBUMIN SERPL-MCNC: 3.9 G/DL (ref 3.5–5.2)
ALBUMIN/GLOBULIN RATIO: 1.3 (ref 1–2.5)
ALP BLD-CCNC: 117 U/L (ref 35–104)
ALT SERPL-CCNC: 17 U/L (ref 5–33)
ANION GAP SERPL CALCULATED.3IONS-SCNC: 11 MMOL/L (ref 9–17)
AST SERPL-CCNC: 16 U/L
BASOPHILS # BLD: 1 % (ref 0–2)
BASOPHILS ABSOLUTE: 0.04 K/UL (ref 0–0.2)
BILIRUB SERPL-MCNC: 0.51 MG/DL (ref 0.3–1.2)
BILIRUBIN DIRECT: 0.09 MG/DL
BILIRUBIN, INDIRECT: 0.42 MG/DL (ref 0–1)
BUN BLDV-MCNC: 16 MG/DL (ref 6–20)
BUN/CREAT BLD: ABNORMAL (ref 9–20)
CALCIUM SERPL-MCNC: 9.6 MG/DL (ref 8.6–10.4)
CHLORIDE BLD-SCNC: 105 MMOL/L (ref 98–107)
CO2: 25 MMOL/L (ref 20–31)
CREAT SERPL-MCNC: 1.03 MG/DL (ref 0.5–0.9)
DIFFERENTIAL TYPE: NORMAL
EOSINOPHILS RELATIVE PERCENT: 4 % (ref 1–4)
GFR AFRICAN AMERICAN: >60 ML/MIN
GFR NON-AFRICAN AMERICAN: >60 ML/MIN
GFR SERPL CREATININE-BSD FRML MDRD: ABNORMAL ML/MIN/{1.73_M2}
GFR SERPL CREATININE-BSD FRML MDRD: ABNORMAL ML/MIN/{1.73_M2}
GLOBULIN: ABNORMAL G/DL (ref 1.5–3.8)
GLUCOSE BLD-MCNC: 107 MG/DL (ref 70–99)
HCG QUALITATIVE: POSITIVE
HCT VFR BLD CALC: 40 % (ref 36.3–47.1)
HEMOGLOBIN: 12.6 G/DL (ref 11.9–15.1)
IMMATURE GRANULOCYTES: 0 %
LACTIC ACID, SEPSIS WHOLE BLOOD: 1.2 MMOL/L (ref 0.5–1.9)
LACTIC ACID, SEPSIS: NORMAL MMOL/L (ref 0.5–1.9)
LIPASE: 55 U/L (ref 13–60)
LYMPHOCYTES # BLD: 40 % (ref 24–43)
MCH RBC QN AUTO: 27.3 PG (ref 25.2–33.5)
MCHC RBC AUTO-ENTMCNC: 31.5 G/DL (ref 28.4–34.8)
MCV RBC AUTO: 86.8 FL (ref 82.6–102.9)
MONOCYTES # BLD: 8 % (ref 3–12)
NRBC AUTOMATED: 0 PER 100 WBC
PDW BLD-RTO: 13.2 % (ref 11.8–14.4)
PLATELET # BLD: 323 K/UL (ref 138–453)
PLATELET ESTIMATE: NORMAL
PMV BLD AUTO: 9.4 FL (ref 8.1–13.5)
POTASSIUM SERPL-SCNC: 4 MMOL/L (ref 3.7–5.3)
RBC # BLD: 4.61 M/UL (ref 3.95–5.11)
RBC # BLD: NORMAL 10*6/UL
SEG NEUTROPHILS: 47 % (ref 36–65)
SEGMENTED NEUTROPHILS ABSOLUTE COUNT: 3.15 K/UL (ref 1.5–8.1)
SODIUM BLD-SCNC: 141 MMOL/L (ref 135–144)
TOTAL PROTEIN: 7 G/DL (ref 6.4–8.3)
WBC # BLD: 6.7 K/UL (ref 3.5–11.3)
WBC # BLD: NORMAL 10*3/UL

## 2021-10-28 PROCEDURE — 80076 HEPATIC FUNCTION PANEL: CPT

## 2021-10-28 PROCEDURE — 80048 BASIC METABOLIC PNL TOTAL CA: CPT

## 2021-10-28 PROCEDURE — 83605 ASSAY OF LACTIC ACID: CPT

## 2021-10-28 PROCEDURE — 6360000002 HC RX W HCPCS: Performed by: GENERAL PRACTICE

## 2021-10-28 PROCEDURE — 74177 CT ABD & PELVIS W/CONTRAST: CPT

## 2021-10-28 PROCEDURE — 85025 COMPLETE CBC W/AUTO DIFF WBC: CPT

## 2021-10-28 PROCEDURE — 6360000004 HC RX CONTRAST MEDICATION: Performed by: GENERAL PRACTICE

## 2021-10-28 PROCEDURE — 84703 CHORIONIC GONADOTROPIN ASSAY: CPT

## 2021-10-28 PROCEDURE — 83690 ASSAY OF LIPASE: CPT

## 2021-10-28 RX ORDER — MORPHINE SULFATE 4 MG/ML
4 INJECTION, SOLUTION INTRAMUSCULAR; INTRAVENOUS ONCE
Status: COMPLETED | OUTPATIENT
Start: 2021-10-28 | End: 2021-10-28

## 2021-10-28 RX ORDER — ONDANSETRON 2 MG/ML
4 INJECTION INTRAMUSCULAR; INTRAVENOUS ONCE
Status: COMPLETED | OUTPATIENT
Start: 2021-10-28 | End: 2021-10-28

## 2021-10-28 RX ADMIN — IOPAMIDOL 75 ML: 755 INJECTION, SOLUTION INTRAVENOUS at 02:17

## 2021-10-28 RX ADMIN — MORPHINE SULFATE 4 MG: 4 INJECTION INTRAVENOUS at 00:27

## 2021-10-28 RX ADMIN — HYDROMORPHONE HYDROCHLORIDE 1 MG: 1 INJECTION, SOLUTION INTRAMUSCULAR; INTRAVENOUS; SUBCUTANEOUS at 01:16

## 2021-10-28 RX ADMIN — ONDANSETRON 4 MG: 2 INJECTION, SOLUTION INTRAMUSCULAR; INTRAVENOUS at 00:29

## 2021-10-28 RX ADMIN — ONDANSETRON 4 MG: 2 INJECTION INTRAMUSCULAR; INTRAVENOUS at 02:37

## 2021-10-28 ASSESSMENT — ENCOUNTER SYMPTOMS
ABDOMINAL PAIN: 1
COUGH: 0
TROUBLE SWALLOWING: 0
NAUSEA: 1
BACK PAIN: 0
VOMITING: 0
SHORTNESS OF BREATH: 0
DIARRHEA: 1

## 2021-10-28 ASSESSMENT — PAIN SCALES - GENERAL
PAINLEVEL_OUTOF10: 10
PAINLEVEL_OUTOF10: 10

## 2021-10-28 NOTE — ED PROVIDER NOTES
[address]  ED  Emergency Department Encounter  EmergencyMedicine Resident     Pt [facility] Kunal Perrin  MRN: [id number]  [place] 1990  Date of evaluation: 10/28/21  PCP:  Zan Dent MD    [address] [place] [address]       Chief Complaint   Patient presents with    Abdominal Pain     c/o upper abd pain; reports nausea, denies emesis; diarrhea light color stool;       HISTORY OF PRESENT ILLNESS  (Location/Symptom, Timing/Onset, Context/Setting, Quality, Duration, Modifying Factors, Severity.)      Myra Randolph is a 27 y.o. female who presents with a gastric abdominal pain that radiates to the back started 2 weeks ago. Patient does have significant history of cholestasis, patient states that this is much worse after she eats. Patient states that she has had diarrhea for the last 5 days, with orange-colored stool. Patient is status post  delivery 2 weeks ago. Patient states she started having worsening abdominal pain approximate 4 hours after eating chili tonight. Patient does have history of sensitivity to certain foods. Patient is currently breast-feeding, denies any dysuria, no fever chills, no vaginal discharge. Patient states she has been taking Tylenol as needed for pain. 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 has a past surgical history that includes  section (2010); Nerve Block (Bilateral, 2018);  Nerve Block (2019); epidural steroid injection (Left, 2019); Spine surgery; US SOFT TISSUE ABSCESS DRAINAGE PERC (10/26/2020); back surgery (2020);  section (N/A, 10/11/2021); and salpingectomy (Bilateral, 10/11/2021). Social History     Socioeconomic History    Marital status:      Spouse name: Not on file    Number of children: Not on file    Years of education: Not on file    Highest education level: Not on file   Occupational History    Not on file   Tobacco Use    Smoking status: Never Smoker    Smokeless tobacco: Never Used   Vaping Use    Vaping Use: Never used   Substance and Sexual Activity    Alcohol use: No    Drug use: No    Sexual activity: Not Currently     Partners: Male   Other Topics Concern    Not on file   Social History Narrative    Not on file     Social Determinants of Health     Financial Resource Strain: Low Risk     Difficulty of Paying Living Expenses: Not hard at all   Food Insecurity: No Food Insecurity    Worried About [address] [employer] in the Last Year: Never true    Ti of Food in the Last Year: Never true   Transportation Needs: No Transportation Needs    Lack of Transportation (Medical): No    Lack of Transportation (Non-Medical):  No   Physical Activity:     Days of Exercise per Week:     Minutes of Exercise per Session:    Stress:     Feeling of Stress :    Social Connections:     Frequency of Communication with Friends and Family:     Frequency of Social Gatherings with Friends and Family:     Attends Advent Services:     Active Member of Clubs or Organizations:     Attends Club or Organization Meetings:     Marital Status:    Intimate Partner Violence:     Fear of Current or Ex-Partner:     Emotionally Abused:     Physically Abused:     Sexually Abused:        Family History   Problem Relation Age of Onset    Diabetes Paternal Grandmother     Heart Failure Maternal Grandmother     Heart Attack Maternal Grandfather     Diabetes Father Allergies:  Latex, Other, Nut [peanut-containing drug products], and Peanut (diagnostic)    Home Medications:  Prior to Admission medications    Medication Sig Start Date End Date Taking? Authorizing Provider   ibuprofen (ADVIL;MOTRIN) 600 MG tablet Take 1 tablet by mouth every 6 hours as needed for Pain  Patient not taking: Reported on 10/25/2021 10/11/21   [place],    ondansetron (ZOFRAN-ODT) 4 MG disintegrating tablet Take 1 tablet by mouth 3 times daily as needed for Nausea or Vomiting  Patient not taking: Reported on 10/25/2021 10/11/21   [place],    ferrous sulfate (IRON 325) 325 (65 Fe) MG tablet Take 1 tablet by mouth 2 times daily 9/8/21   DEAN Potts NP   levothyroxine (SYNTHROID) 25 MCG tablet Take 1 tablet by mouth daily 8/10/21 9/9/21  DEAN Clark CNP   Blood Glucose Monitoring Suppl (TRUE METRIX METER) w/Device KIT use four times a day to [address] [place] [address]  Patient not taking: Reported on 10/25/2021 7/6/21   Historical Provider, MD   levothyroxine (SYNTHROID) 100 MCG tablet take 1 tablet by mouth once daily 7/2/21   DEAN Clark CNP   acetaminophen (APAP EXTRA STRENGTH) 500 MG tablet Take 2 tablets by mouth every 6 hours as needed for Pain  Patient not taking: Reported on 10/25/2021 5/11/21   Seda Louise   Prenatal Vit-Fe Fumarate-FA (PRENATAL VITAMINS PLUS PO) Take by mouth    Historical Provider, MD   albuterol sulfate  (90 Base) MCG/ACT inhaler Inhale 2 puffs into the lungs every 6 hours as needed for Wheezing or Shortness of Breath 12/18/19   Teri Cordova MD       REVIEW OF SYSTEMS    (2-9 systems for level 4, 10 or more for level 5)      Review of Systems   Constitutional: Positive for activity change and appetite change. Negative for chills, fatigue and fever. HENT: Negative for ear pain and trouble swallowing. Respiratory: Negative for cough and shortness of breath. Cardiovascular: Negative for chest pain. Gastrointestinal: Positive for abdominal pain, diarrhea and nausea. Negative for vomiting. Genitourinary: Negative for difficulty urinating, dysuria, frequency, hematuria, pelvic pain, urgency and vaginal discharge. Musculoskeletal: Negative for back pain. Skin: Negative for rash and wound. Neurological: Negative for dizziness and headaches. Psychiatric/Behavioral: Negative for agitation and confusion. The patient is not nervous/anxious. PHYSICAL EXAM   (up to 7 for level 4, 8 or more for level 5)      INITIAL VITALS:   /88   Pulse 92   Temp 98.2 °F (36.8 °C)   Resp 18   Wt 222 lb (100.7 kg)   SpO2 99%   BMI 31.85 kg/m²     Physical Exam  Vitals reviewed. Constitutional:       Comments: Patient appears very uncomfortable, is standing up, is difficult time lying down getting comfortable, is alert, is not in acute distress or toxic appearing   HENT:      Head: Normocephalic and atraumatic. Cardiovascular:      Rate and Rhythm: Normal rate. Pulmonary:      Comments: Breathing comfortable room air, symmetric chest rise, speaking full sentences, no evidence respirator stress  Abdominal:      General: Abdomen is flat. Palpations: Abdomen is soft. Tenderness: There is abdominal tenderness in the epigastric area. There is no right CVA tenderness, left CVA tenderness, guarding or rebound. Negative signs include Farrell's sign. Neurological:      General: No focal deficit present. Mental Status: She is oriented to person, place, and time. Cranial Nerves: No cranial nerve deficit. Motor: No weakness. Psychiatric:         Mood and Affect: Mood normal. Mood is not anxious or depressed.          Behavior: Behavior normal.         DIFFERENTIAL  DIAGNOSIS     PLAN (LABS / IMAGING / EKG):  Orders Placed This Encounter   Procedures    CT ABDOMEN PELVIS W IV CONTRAST Additional Contrast? None    CBC WITH AUTO DIFFERENTIAL    BASIC METABOLIC PANEL    HEPATIC FUNCTION PANEL    LIPASE    Urinalysis Reflex to Culture    Lactate, Sepsis    PREVIOUS SPECIMEN    HCG Qualitative, Serum       MEDICATIONS ORDERED:  Orders Placed This Encounter   Medications    morphine injection 4 mg    ondansetron (ZOFRAN) injection 4 mg    HYDROmorphone (DILAUDID) injection 1 mg    iopamidol (ISOVUE-370) 76 % injection 75 mL    ondansetron (ZOFRAN) injection 4 mg       DDX: Pancreatitis, cholelithiasis, choledocholithiasis, cholecystitis, biliary dyskinesia, peptic ulcer disease, gastritis, GERD    DIAGNOSTIC RESULTS / EMERGENCY DEPARTMENT COURSE / MDM   :  Results for orders placed or performed during the hospital encounter of 10/28/21   CBC WITH AUTO DIFFERENTIAL   Result Value Ref Range    WBC 6.7 3.5 - 11.3 k/uL    RBC 4.61 3.95 - 5.11 m/uL    Hemoglobin 12.6 11.9 - 15.1 g/dL    Hematocrit 40.0 36.3 - 47.1 %    MCV 86.8 82.6 - 102.9 fL    MCH 27.3 25.2 - 33.5 pg    MCHC 31.5 28.4 - 34.8 g/dL    RDW 13.2 11.8 - 14.4 %    Platelets 735 517 - 897 k/uL    MPV 9.4 8.1 - 13.5 fL    NRBC Automated 0.0 0.0 per 100 WBC    Differential Type NOT REPORTED     Seg Neutrophils 47 36 - 65 %    Lymphocytes 40 24 - 43 %    Monocytes 8 3 - 12 %    Eosinophils % 4 1 - 4 %    Basophils 1 0 - 2 %    Immature Granulocytes 0 0 %    Segs Absolute 3.15 1.50 - 8.10 k/uL    Absolute Lymph # 2.68 1.10 - 3.70 k/uL    Absolute Mono # 0.55 0.10 - 1.20 k/uL    Absolute Eos # 0.28 0.00 - 0.44 k/uL    Basophils Absolute 0.04 0.00 - 0.20 k/uL    Absolute Immature Granulocyte <0.03 0.00 - 0.30 k/uL    WBC Morphology NOT REPORTED     RBC Morphology NOT REPORTED     Platelet Estimate NOT REPORTED    BASIC METABOLIC PANEL   Result Value Ref Range    Glucose 107 (H) 70 - 99 mg/dL    BUN 16 6 - 20 mg/dL    CREATININE 1.03 (H) 0.50 - 0.90 mg/dL    Bun/Cre Ratio NOT REPORTED 9 - 20    Calcium 9.6 8.6 - 10.4 mg/dL    Sodium 141 135 - 144 mmol/L    Potassium 4.0 3.7 - 5.3 mmol/L    Chloride 105 98 - 107 mmol/L    CO2 25 20 - 31 mmol/L    Anion Gap 11 9 - 17 mmol/L    GFR Non-African American >60 >60 mL/min    GFR African American >60 >60 mL/min    GFR Comment          GFR Staging NOT REPORTED    HEPATIC FUNCTION PANEL   Result Value Ref Range    Albumin 3.9 3.5 - 5.2 g/dL    Alkaline Phosphatase 117 (H) 35 - 104 U/L    ALT 17 5 - 33 U/L    AST 16 <32 U/L    Total Bilirubin 0.51 0.3 - 1.2 mg/dL    Bilirubin, Direct 0.09 <0.31 mg/dL    Bilirubin, Indirect 0.42 0.00 - 1.00 mg/dL    Total Protein 7.0 6.4 - 8.3 g/dL    Globulin NOT REPORTED 1.5 - 3.8 g/dL    Albumin/Globulin Ratio 1.3 1.0 - 2.5   LIPASE   Result Value Ref Range    Lipase 55 13 - 60 U/L   Lactate, Sepsis   Result Value Ref Range    Lactic Acid, Sepsis NOT REPORTED 0.5 - 1.9 mmol/L    Lactic Acid, Sepsis, Whole Blood 1.2 0.5 - 1.9 mmol/L   HCG Qualitative, Serum   Result Value Ref Range    hCG Qual POSITIVE (A) NEGATIVE           RADIOLOGY:   CT ABDOMEN PELVIS W IV CONTRAST Additional Contrast? None   Performed: 10/28/21 0224  Final          Impression: Unremarkable postcontrast CT of the abdomen and pelvis 2 weeks status post . No concerning finding to explain the patient's symptoms. EKG  None    All EKG's are interpreted by the Emergency Department Physician who either signs or Co-signs this chart in the absence of a cardiologist.    EMERGENCY DEPARTMENT COURSE/IMPRESSION: 58-year-old female present emergency department with epigastric abdominal pain 4 hours after eating chili tonight, patient of note is 2 weeks postpartum, status post . Patient normotensive. Patient appears extremely uncomfortable on arrival, abdomen soft with tenderness palpation epigastric region, point-of-care ultrasound was used which showed enlarged gallbladder but no evidence of pericholecystic fluid or stones identified. Remainder of work-up was unremarkable. Due to patient's pain out of proportion CT abdomen was obtained which was unremarkable.   Patient was given morphine, Zofran and Dilaudid for pain and nausea control. Patient symptoms did improve, had shared to me conversation with patient, at this time patient would like to go home as she has a  ER. Educated patient on follow-up, strict ED return precautions were given.       PROCEDURES:  None    CONSULTS:  None    CRITICAL CARE:  None    FINAL IMPRESSION      1. Epigastric pain          DISPOSITION / PLAN     DISPOSITION Decision To Discharge 10/28/2021 03:40:50 AM      PATIENT REFERRED TO:  Jennie Whitaker MD  [address] [facility] [phone]    In 2 days      [facility]  [address] [facility] [address] [phone]    As needed, If symptoms worsen      DISCHARGE MEDICATIONS:  Discharge Medication List as of 10/28/2021  3:41 AM          Nancy Muniz DO  Emergency Medicine Resident    (Please note that portions of thisnote were completed with a voice recognition program.  Efforts were made to edit the dictations but occasionally words are mis-transcribed.)     Nancy Muniz DO  Resident  10/28/21 7615

## 2021-10-28 NOTE — ED NOTES
PT. Has pain to the right side of her abd that move to her back. PT. Pain started this morning. PT. Pain has been on and off for a while but it always worst after she hurts.       Justine Yanes RN  10/28/21 2157

## 2021-10-28 NOTE — ED NOTES
The following labs labeled with pt sticker and tubed to lab:     [] Blue     [x] Lavender   [] on ice  [x] Green/yellow  [x] Green/black [] on ice  [] Yellow  [] Red  [] Pink      [] COVID-19 swab    [] Rapid  [] PCR  [] Peds Viral Panel     [] Urine Sample  [] Pelvic Cultures  [] Blood Cultures            Dorota Parker RN  10/28/21 0246

## 2021-10-29 ENCOUNTER — CARE COORDINATION (OUTPATIENT)
Dept: CARE COORDINATION | Age: 31
End: 2021-10-29

## 2021-10-29 NOTE — CARE COORDINATION
ACM follow up Ed visit/COVID monitoring. Pt to the ED with c/o abdominal pain. Spoke to patient who is 2 weeks post partem. States she has to wait till 111/10 to see PCP to get a HIDA scan for GB. States taking motrin and zofran. Denies fever or covid symptoms. Patient contacted regarding COVID-19 . Discussed COVID-19 related testing which was not done at this time. Test results were not done. Patient informed of results, if available? NA. Ambulatory Care Manager contacted the patient by telephone to perform post discharge assessment. Call within 2 business days of discharge: Yes. Verified name and  with patient as identifiers. Provided introduction to self, and explanation of the CTN/ACM role, and reason for call due to risk factors for infection and/or exposure to COVID-19. Symptoms reviewed with patient who verbalized the following symptoms: no new symptoms and no worsening symptoms. Due to no new or worsening symptoms encounter was not routed to provider for escalation. Discussed follow-up appointments. If no appointment was previously scheduled, appointment scheduling offered: . [facility] follow up appointment(s):   Future Appointments   Date Time Provider Westley Smith   11/10/2021  7:45 [facility]   2021 10:45 AM Prakash Major APRN - [address] [place] [address] follow up appointment(s):     Non-face-to-face services provided:  Obtained and reviewed discharge summary and/or continuity of care documents  Education of patient/family/caregiver/guardian to support self-management-done  Assessment and support for treatment adherence and medication management-done     Advance Care Planning:   Does patient have an Advance Directive:  decision maker updated. Educated patient about risk for severe COVID-19 due to risk factors according to CDC guidelines.  ACM reviewed discharge instructions, medical action plan and red flag symptoms with the patient who verbalized understanding. Discussed COVID vaccination status: Yes. Education provided on COVID-19 vaccination as appropriate. Discussed exposure protocols and quarantine with CDC Guidelines. Patient was given an opportunity to verbalize any questions and concerns and agrees to contact ACM or health care provider for questions related to their healthcare. Reviewed and educated patient on any new and changed medications related to discharge diagnosis     Was patient discharged with a pulse oximeter? No Discussed and confirmed pulse oximeter discharge instructions and when to notify provider or seek emergency care. ACM provided contact information. No further follow-up call identified based on severity of symptoms and risk factors.

## 2021-10-31 NOTE — ED PROVIDER NOTES
[address] [facility]   Emergency Department  Faculty Attestation       I performed a history and physical examination of the patient and discussed management with the resident. I reviewed the residents note and agree with the documented findings including all diagnostic interpretations and plan of care. Any areas of disagreement are noted on the chart. I was personally present for the key portions of any procedures. I have documented in the chart those procedures where I was not present during the key portions. I have reviewed the emergency nurses triage note. I agree with the chief complaint, past medical history, past surgical history, allergies, medications, social and family history as documented unless otherwise noted below. Documentation of the HPI, Physical Exam and Medical Decision Making performed by scribkym is based on my personal performance of the HPI, PE and MDM. For Physician Assistant/ Nurse Practitioner cases/documentation I have personally evaluated this patient and have completed at least one if not all key elements of the E/M (history, physical exam, and MDM). Additional findings are as noted. Pertinent Comments     Primary Care Physician: Cee Salazar MD      ED Triage Vitals [10/27/21 2356]   BP Temp Temp src Pulse Resp SpO2 Height Weight   124/88 98.2 °F (36.8 °C) -- 92 18 99 % -- 222 lb (100.7 kg)        History/Physical: This is a 27 y.o. female who presents to the Emergency Department with complaint of abdominal pain that is epigastric and radiates to the back intermittent for 2 weeks, but worse tonight. Patient is post-partum from a  2 weeks prior. Did have some similar pain and had cholestasis previously. No vaginal discharge or bleeding. On exam patient appears uncomfortable and cannot sit still in bed, but non-toxic appearing. NC/AT with moist mucus membranes. Heart sounds regular and lung clear to auscultation.   Abdomen with tenderness in the epigastric and RUQ with no rebound or guarding.  scar is low transverse and well appearing with no surrounding erythema or drainage. Alert and oriented moving all extremities. No rashes or jaundice on exposed skin. MDM/Plan:   Abdominal pain  Concern for possible cholelithiasis. Bedside point of care ultrasound performed. Distended gallbladder but no signs of cholecystitis. Read/report available in Epic under imaging section and images available in PACS.     Basic labs  CT scan  Csection is well appearing btu given recent surgery will page OB  D/C         Critical Care: None     Louise Hackett MD  Attending Emergency Physician         Louise Hackett MD  10/31/21 0607

## 2021-11-09 ASSESSMENT — PATIENT HEALTH QUESTIONNAIRE - PHQ9
SUM OF ALL RESPONSES TO PHQ QUESTIONS 1-9: 0
2. FEELING DOWN, DEPRESSED OR HOPELESS: 0
SUM OF ALL RESPONSES TO PHQ9 QUESTIONS 1 & 2: 0
1. LITTLE INTEREST OR PLEASURE IN DOING THINGS: 0
SUM OF ALL RESPONSES TO PHQ QUESTIONS 1-9: 0
SUM OF ALL RESPONSES TO PHQ QUESTIONS 1-9: 0

## 2021-11-09 NOTE — PROGRESS NOTES
Visit Information    Have you changed or started any medications since your last visit including any over-the-counter medicines, vitamins, or herbal medicines? no   Are you having any side effects from any of your medications? -  no  Have you stopped taking any of your medications? Is so, why? -  no    Have you seen any other physician or provider since your last visit? Yes - Records Obtained  Have you had any other diagnostic tests since your last visit? No  Have you been seen in the emergency room and/or had an admission to a hospital since we last saw you? Yes - Records Obtained  Have you had your routine dental cleaning in the past 6 months? no    Have you activated your dotCloud account? If not, what are your barriers?  Yes     Patient Care Team:  Dionna Ohara MD as PCP - General (Family Medicine)  Dionna Ohara MD as PCP - [facility]  Jorden Matos MD as Obstetrician (Perinatology)    Medical History Review  Past Medical, Family, and Social History reviewed and does contribute to the patient presenting condition    Health Maintenance   Topic Date Due    Varicella vaccine (1 of 2 - 2-dose childhood series) Never done    Pneumococcal 0-64 years Vaccine (1 of 3 - PCV13) Never done    COVID-19 Vaccine (1) Never done    Hepatitis B vaccine (1 of 3 - Risk 3-dose series) Never done    Flu vaccine (1) Never done    A1C test (Diabetic or Prediabetic)  07/02/2022    TSH testing  08/10/2022    Cervical cancer screen  05/03/2026    DTaP/Tdap/Td vaccine (2 - Td or Tdap) 12/27/2026    Hepatitis C screen  Completed    HIV screen  Completed    Hepatitis A vaccine  Aged Out    Hib vaccine  Aged Out    Meningococcal (ACWY) vaccine  Aged Out

## 2021-11-10 ENCOUNTER — TELEMEDICINE (OUTPATIENT)
Dept: FAMILY MEDICINE CLINIC | Age: 31
End: 2021-11-10
Payer: MEDICARE

## 2021-11-10 DIAGNOSIS — R10.11 RIGHT UPPER QUADRANT PAIN: Primary | ICD-10-CM

## 2021-11-10 DIAGNOSIS — K83.1 CHOLESTASIS: ICD-10-CM

## 2021-11-10 PROCEDURE — G8427 DOCREV CUR MEDS BY ELIG CLIN: HCPCS | Performed by: FAMILY MEDICINE

## 2021-11-10 PROCEDURE — 99214 OFFICE O/P EST MOD 30 MIN: CPT | Performed by: FAMILY MEDICINE

## 2021-11-10 PROCEDURE — 1111F DSCHRG MED/CURRENT MED MERGE: CPT | Performed by: FAMILY MEDICINE

## 2021-11-10 RX ORDER — OMEPRAZOLE 20 MG/1
20 CAPSULE, DELAYED RELEASE ORAL 2 TIMES DAILY
Qty: 60 CAPSULE | Refills: 1 | Status: SHIPPED | OUTPATIENT
Start: 2021-11-10 | End: 2022-02-07 | Stop reason: ALTCHOICE

## 2021-11-10 RX ORDER — DICYCLOMINE HYDROCHLORIDE 10 MG/1
10 CAPSULE ORAL 4 TIMES DAILY
Qty: 90 CAPSULE | Refills: 0 | Status: SHIPPED | OUTPATIENT
Start: 2021-11-10 | End: 2022-02-07 | Stop reason: ALTCHOICE

## 2021-11-10 ASSESSMENT — ENCOUNTER SYMPTOMS
RECTAL PAIN: 0
ABDOMINAL PAIN: 1
COUGH: 0
SINUS PRESSURE: 0
CHEST TIGHTNESS: 0
NAUSEA: 1
COLOR CHANGE: 0
WHEEZING: 0
BACK PAIN: 0
DIARRHEA: 0
BLOOD IN STOOL: 0
ABDOMINAL DISTENTION: 1
CONSTIPATION: 0
SHORTNESS OF BREATH: 0
VOMITING: 0
PHOTOPHOBIA: 0
RHINORRHEA: 0

## 2021-11-10 NOTE — PROGRESS NOTES
[place] [address]   [address] [facility] [address]  Phone: [phone], Fax: [phone]    TELEHEALTH EVALUATION -- Audio/Visual (During [facility]-07 public health emergency)    Patient ID verified by me prior to start of this visit    Cristobal Villa (:  1990) has requested an audio/video evaluation for the following concern(s):  Chief Complaint   Patient presents with    ED Follow-up     Pt was seen at [facility] 1-2 weeks ago. Pt had abdominal pain and diagnosis was gallbladder. Pt states since home from the ER  she is still having the pain and requesting Hida Scan.  Health Maintenance     Declined covid vaccine. HPI:  Cristobal Villa is an established patient of Patrick Lawrence MD  . Patient has a history of a right abdominal pain. Patient reports the pain is on and off sharp about 7-8 on scale. Patient went to ER due to pain. She had CT abdomen done that did not show any acute findings. Patient had cholestasis during pregnancy. She also had elevated liver enzymes at that time and her emergency . Patient did not had any gallbladder taken out. Patient reports the pain is on and off as well with nausea with occasional vomiting. She is not able to tolerate food. She denies any diarrhea or loose stools. Patient had blood work done which was normal. Liver enzymes CBC is all normal.    Patient needs HIDA scan to rule out cholecystitis. [x]Negative depression screening. []1-4 = Minimal depression   []5-9 = Mild depression   []10-14 = Moderate depression   []15-19 = Moderately severe depression   []20-27 = Severe depression  PHQ Scores 2021 2021 3/16/2021 2020 2019 10/31/2018 2018   PHQ2 Score 0 0 0 0 0 0 0   PHQ9 Score 0 0 0 0 0 0 0     Review of Systems   Constitutional: Positive for activity change and appetite change. Negative for diaphoresis, fatigue and unexpected weight change.    HENT: Negative for congestion, hearing loss, mouth sores, postnasal drip, rhinorrhea, sinus pressure and tinnitus. Eyes: Negative for photophobia and visual disturbance. Respiratory: Negative for cough, chest tightness, shortness of breath and wheezing. Cardiovascular: Negative for chest pain, palpitations and leg swelling. Gastrointestinal: Positive for abdominal distention, abdominal pain and nausea. Negative for blood in stool, constipation, diarrhea, rectal pain and vomiting. Endocrine: Negative for polyphagia and polyuria. Genitourinary: Negative for difficulty urinating, flank pain, frequency and urgency. Musculoskeletal: Negative for arthralgias, back pain, gait problem and myalgias. Skin: Negative for color change and wound. Neurological: Negative for speech difficulty, weakness, light-headedness, numbness and headaches. Psychiatric/Behavioral: Negative for agitation, decreased concentration, dysphoric mood, hallucinations, self-injury, sleep disturbance and suicidal ideas. The patient is nervous/anxious.         Patient Active Problem List    Diagnosis Date Noted    Tarlov cysts 2021    H/O:  2021     (vaginal birth after ) X 2 ( and ) 2021    Family history of Down syndrome 2021    RLTCS w/ RRS (gen) 10/11/21 M Apg 8/8 Wt 7#1 10/11/2021    Patient-requested procedure     BMI 35.0-35.9,adult     cHTN (no meds) 2021    Pregestational Diabetes 2021    Family history of diabetes mellitus in father 2021    History of Chiari malformation 2021    History of back surgery 2021    Family history of congenital heart defect 2021    POTS (postural orthostatic tachycardia syndrome) 2020    CSF leak 2020    Cyst of spinal meninges 2020    Double vision 2020    Hemicrania continua 2020    Dizziness 2020    Sacroiliitis, not elsewhere classified ([address] 2020    Class 2 obesity due to mouth 4 times daily 90 capsule 0    ibuprofen (ADVIL;MOTRIN) 600 MG tablet Take 1 tablet by mouth every 6 hours as needed for Pain 30 tablet 0    ondansetron (ZOFRAN-ODT) 4 MG disintegrating tablet Take 1 tablet by mouth 3 times daily as needed for Nausea or Vomiting 21 tablet 0    ferrous sulfate (IRON 325) 325 (65 Fe) MG tablet Take 1 tablet by mouth 2 times daily 180 tablet 1    Blood Glucose Monitoring Suppl (TRUE METRIX METER) w/Device KIT use four times a day to CHECK BLOOD SUGAR      levothyroxine (SYNTHROID) 100 MCG tablet take 1 tablet by mouth once daily 30 tablet 2    acetaminophen (APAP EXTRA STRENGTH) 500 MG tablet Take 2 tablets by mouth every 6 hours as needed for Pain 60 tablet 2    Prenatal Vit-Fe Fumarate-FA (PRENATAL VITAMINS PLUS PO) Take by mouth      albuterol sulfate  (90 Base) MCG/ACT inhaler Inhale 2 puffs into the lungs every 6 hours as needed for Wheezing or Shortness of Breath 1 Inhaler 1    levothyroxine (SYNTHROID) 25 MCG tablet Take 1 tablet by mouth daily 30 tablet 1     No current facility-administered medications for this visit.        Allergies   Allergen Reactions    Latex Hives and Swelling    Other Shortness Of Breath     Bleach causes reactive airway disease    Nut [Peanut-Containing Drug Products] Nausea And Vomiting    Peanut (Diagnostic)      Other reaction(s): Nausea And Vomiting        Social History     Tobacco Use    Smoking status: Never Smoker    Smokeless tobacco: Never Used   Vaping Use    Vaping Use: Never used   Substance Use Topics    Alcohol use: No    Drug use: No        PHYSICAL EXAMINATION:  Vital Signs: (As obtained by patient/caregiver or practitioner observation)  Patient-Reported Vitals 11/9/2021   Patient-Reported Weight 220lb   Patient-Reported Height 5'10   Patient-Reported Systolic 798   Patient-Reported Diastolic 60   Patient-Reported Pulse 86   Patient-Reported Temperature 97.6        Constitutional: [x] Appears well-developed and well-nourished [x] No apparent distress      [] Abnormal-   Mental status  [x] Alert and awake  [x] Oriented to person/place/time [x]Able to follow commands      Eyes:  EOM    [x]  Normal  [] Abnormal-  Sclera  [x]  Normal  [] Abnormal -         Discharge [x]  None visible  [] Abnormal -    HENT:   [x] Normocephalic, atraumatic. [] Abnormal   [x] Mouth/Throat: Mucous membranes are moist.     External Ears [x] Normal  [] Abnormal-     Neck: [x] No visualized mass     Pulmonary/Chest: [x] Respiratory effort normal.  [x] No visualized signs of difficulty breathing or respiratory distress        [] Abnormal     Musculoskeletal:   [x] Normal gait with no signs of ataxia         [x] Normal range of motion of neck        [] Abnormal-     Neurological:        [x] No Facial Asymmetry (Cranial nerve 7 motor function) (limited exam to video visit)          [x] No gaze palsy        [] Abnormal-     Skin:        [x] No significant exanthematous lesions or discoloration noted on facial skin         [] Abnormal-     Psychiatric:       [x] Normal Affect [x] No Hallucinations        [x] Abnormal- Anxious, with pressured speech    Other pertinent observable physical exam findings-   Lab Results   Component Value Date    WBC 6.7 10/28/2021    HGB 12.6 10/28/2021    HCT 40.0 10/28/2021    MCV 86.8 10/28/2021     10/28/2021     Lab Results   Component Value Date     10/28/2021    K 4.0 10/28/2021     10/28/2021    CO2 25 10/28/2021    BUN 16 10/28/2021    CREATININE 1.03 10/28/2021    GLUCOSE 107 10/28/2021    CALCIUM 9.6 10/28/2021        Due to this being a TeleHealth encounter, evaluation of the following organ systems is limited: Vitals/Constitutional/EENT/Resp/CV/GI//MS/Neuro/Skin/Heme-Lymph-Imm. ASSESSMENT/PLAN:  1. Right upper quadrant pain    - [place] HEPATOBILIARY; Future  - omeprazole (PRILOSEC) 20 MG delayed release capsule; Take 1 capsule by mouth 2 times daily  Dispense: 60 capsule;  Refill: 1  - dicyclomine (BENTYL) 10 MG capsule; Take 1 capsule by mouth 4 times daily  Dispense: 90 capsule; Refill: 0  - Ambulatory referral to General Surgery    2. Cholestasis    - [place] HEPATOBILIARY; Future  - omeprazole (PRILOSEC) 20 MG delayed release capsule; Take 1 capsule by mouth 2 times daily  Dispense: 60 capsule; Refill: 1  - dicyclomine (BENTYL) 10 MG capsule; Take 1 capsule by mouth 4 times daily  Dispense: 90 capsule; Refill: 0  - Ambulatory referral to General Surgery    Controlled Substance Monitoring:  Acute and Chronic Pain Monitoring:   RX Monitoring 10/31/2018   Attestation The Prescription Monitoring Report for this patient was reviewed today. Acute Pain Prescriptions Severe pain not adequately treated with lower dose. Periodic Controlled Substance Monitoring Possible medication side effects, risk of tolerance/dependence & alternative treatments discussed. ;No signs of potential drug abuse or diversion identified. Chronic Pain > 80 MEDD Reviewed the patient's functional status and documentation. Orders Placed This Encounter   Procedures    NM HEPATOBILIARY     Standing Status:   Future     Standing Expiration Date:   11/10/2022     Order Specific Question:   Reason for exam:     Answer:   pain    Ambulatory referral to General Surgery     Referral Priority:   Routine     Referral Type:   Eval and Treat     Referral Reason:   Specialty Services Required     Referred to Provider:   Alexander [place], DO     Requested Specialty:   General Surgery     Number of Visits Requested:   1      Orders Placed This Encounter   Medications    omeprazole (PRILOSEC) 20 MG delayed release capsule     Sig: Take 1 capsule by mouth 2 times daily     Dispense:  60 capsule     Refill:  1    dicyclomine (BENTYL) 10 MG capsule     Sig: Take 1 capsule by mouth 4 times daily     Dispense:  90 capsule     Refill:  0      There are no discontinued medications.    Gilberto Lovell received counseling on the following healthy behaviors: nutrition, exercise and medication adherence  Reviewed prior labs and health maintenance. Continue current medications, diet and exercise. Discussed use, benefit, and side effects of prescribed medications. Barriers to medication compliance addressed. Patient given educational materials - see patient instructions. All patient questions answered. Patient voiced understanding. No follow-ups on file. Wilner Galaviz is a 27 y.o. female patient  being evaluated by a Virtual Visit (video visit) encounter to address concerns as mentioned above. A caregiver was present when appropriate. Due to this being a TeleHealth encounter (During [facility]-17 public health emergency), evaluation of the following organ systems was limited:Vitals/Constitutional/EENT/Resp/CV/GI//MS/Neuro/Skin/Heme-Lymph-Imm. Services were provided through a video synchronous discussion virtually to substitute for in-person clinic visit. This is a telehealth visit that was performed with the originating site at Patient Location: home and provider Location of [place], [place]. Verbal consent to participate in video visit was obtained. Patient ID verified by me prior to start of this visit  I discussed with the patient the nature of our telehealth visits via interactive/real-time audio/video that:  - I would evaluate the patient and recommend diagnostics and treatments based on my assessment  - Our sessions are not being recorded and that personal health information is protected  - Our team would provide follow up care in person if/when the patient needs it. Pursuant to the emergency declaration under the [facility], [address] [place] [address] wa[facility] authority and the [employer] and [employer] Act, this Virtual Visit was conducted with patient's (and/or legal guardian's) consent, to reduce the patient's risk of exposure to COVID-19 and provide necessary medical care.   The patient (and/or legal guardian) has also been advised to contact this office for worsening conditions or problems, and seek emergency medical treatment and/or call 911 if deemed necessary. This note was completed by using the assistance of a speech-recognition program. However, inadvertent computerized transcription errors may be present. Although every effort was made to ensure accuracy, no guarantees can be provided that every mistake has been identified and corrected by editing.   Electronically signed by Gretel Schwarz MD on 11/10/21 at 7:42 AM EST

## 2021-11-15 ENCOUNTER — HOSPITAL ENCOUNTER (OUTPATIENT)
Dept: NUCLEAR MEDICINE | Age: 31
Discharge: HOME | End: 2021-11-17
Payer: MEDICARE

## 2021-11-15 VITALS — WEIGHT: 220 LBS | BODY MASS INDEX: 31.57 KG/M2

## 2021-11-15 DIAGNOSIS — K83.1 CHOLESTASIS: ICD-10-CM

## 2021-11-15 DIAGNOSIS — R10.11 RIGHT UPPER QUADRANT PAIN: ICD-10-CM

## 2021-11-15 PROCEDURE — A9537 TC99M MEBROFENIN: HCPCS | Performed by: FAMILY MEDICINE

## 2021-11-15 PROCEDURE — 78226 HEPATOBILIARY SYSTEM IMAGING: CPT

## 2021-11-15 PROCEDURE — 2580000003 HC RX 258: Performed by: FAMILY MEDICINE

## 2021-11-15 PROCEDURE — 3430000000 HC RX DIAGNOSTIC RADIOPHARMACEUTICAL: Performed by: FAMILY MEDICINE

## 2021-11-15 RX ORDER — SODIUM CHLORIDE 0.9 % (FLUSH) 0.9 %
10 SYRINGE (ML) INJECTION PRN
Status: DISCONTINUED | OUTPATIENT
Start: 2021-11-15 | End: 2021-11-18 | Stop reason: HOSPADM

## 2021-11-15 RX ADMIN — Medication 5 MILLICURIE: at 10:46

## 2021-11-15 RX ADMIN — SODIUM CHLORIDE, PRESERVATIVE FREE 10 ML: 5 INJECTION INTRAVENOUS at 10:46

## 2021-11-16 ENCOUNTER — TELEPHONE (OUTPATIENT)
Dept: SURGERY | Age: 31
End: 2021-11-16

## 2021-11-16 ENCOUNTER — OFFICE VISIT (OUTPATIENT)
Dept: SURGERY | Age: 31
End: 2021-11-16
Payer: MEDICARE

## 2021-11-16 VITALS
RESPIRATION RATE: 12 BRPM | WEIGHT: 220 LBS | HEIGHT: 70 IN | OXYGEN SATURATION: 100 % | BODY MASS INDEX: 31.5 KG/M2 | HEART RATE: 90 BPM

## 2021-11-16 DIAGNOSIS — K82.8 DYSFUNCTIONAL GALLBLADDER: ICD-10-CM

## 2021-11-16 DIAGNOSIS — K80.20 SYMPTOMATIC CHOLELITHIASIS: Primary | ICD-10-CM

## 2021-11-16 PROCEDURE — G8484 FLU IMMUNIZE NO ADMIN: HCPCS | Performed by: SURGERY

## 2021-11-16 PROCEDURE — 1036F TOBACCO NON-USER: CPT | Performed by: SURGERY

## 2021-11-16 PROCEDURE — G8427 DOCREV CUR MEDS BY ELIG CLIN: HCPCS | Performed by: SURGERY

## 2021-11-16 PROCEDURE — 99204 OFFICE O/P NEW MOD 45 MIN: CPT | Performed by: SURGERY

## 2021-11-16 PROCEDURE — G8417 CALC BMI ABV UP PARAM F/U: HCPCS | Performed by: SURGERY

## 2021-11-16 NOTE — PROGRESS NOTES
[id number] [address] [facility] Surgery   History & Physical  Katerin Real DO  Pt Name: Misti Thomas  MRN: [id number]  YOB: 1990  Date of evaluation: 2021  Primary Care Physician: Janet Crisostomo MD    Chief Complaint: gallbladder sludge, gallbladder dyskinesia      SUBJECTIVE:    History of Present Illness: This is a 27 y.o.  female who presents for evaluation for the above, patient has been complaining about right upper quadrant epigastric discomfort with fatty foods for some time, past surgical history significant for  section performed 5 weeks ago. Patient denies any other abdominal surgeries, she is interested in cholecystectomy. 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 Past Surgical History   has a past surgical history that includes  section (2010); Nerve Block (Bilateral, 2018); Nerve Block (2019); epidural steroid injection (Left, 2019); Spine surgery; US SOFT TISSUE ABSCESS DRAINAGE PERC (10/26/2020); back surgery (2020);  section (N/A, 10/11/2021); and salpingectomy (Bilateral, 10/11/2021). Family History  family history includes Diabetes in her father and paternal grandmother; Heart Attack in her maternal grandfather; Heart Failure in her maternal grandmother. Social History  Tobacco use:  reports that she has never smoked.  She has never used smokeless tobacco.  Alcohol use:  reports no history of alcohol use. Drug use:  reports no history of drug use. Medications  Current Medications:   Current Outpatient Medications   Medication Sig Dispense Refill    ibuprofen (ADVIL;MOTRIN) 600 MG tablet Take 1 tablet by mouth every 6 hours as needed for Pain 30 tablet 0    ondansetron (ZOFRAN-ODT) 4 MG disintegrating tablet Take 1 tablet by mouth 3 times daily as needed for Nausea or Vomiting 21 tablet 0    ferrous sulfate (IRON 325) 325 (65 Fe) MG tablet Take 1 tablet by mouth 2 times daily 180 tablet 1    Blood Glucose Monitoring Suppl (TRUE METRIX METER) w/Device KIT use four times a day to CHECK BLOOD SUGAR      levothyroxine (SYNTHROID) 100 MCG tablet take 1 tablet by mouth once daily 30 tablet 2    acetaminophen (APAP EXTRA STRENGTH) 500 MG tablet Take 2 tablets by mouth every 6 hours as needed for Pain 60 tablet 2    Prenatal Vit-Fe Fumarate-FA (PRENATAL VITAMINS PLUS PO) Take by mouth      albuterol sulfate  (90 Base) MCG/ACT inhaler Inhale 2 puffs into the lungs every 6 hours as needed for Wheezing or Shortness of Breath 1 Inhaler 1    omeprazole (PRILOSEC) 20 MG delayed release capsule Take 1 capsule by mouth 2 times daily (Patient not taking: Reported on 11/16/2021) 60 capsule 1    dicyclomine (BENTYL) 10 MG capsule Take 1 capsule by mouth 4 times daily (Patient not taking: Reported on 11/16/2021) 90 capsule 0    levothyroxine (SYNTHROID) 25 MCG tablet Take 1 tablet by mouth daily 30 tablet 1     No current facility-administered medications for this visit. Facility-Administered Medications Ordered in Other Visits   Medication Dose Route Frequency Provider Last Rate Last Admin    sodium chloride flush 0.9 % injection 10 mL  10 mL IntraVENous PRN Tushar Richardson MD   10 mL at 11/15/21 1046     Home Medications:   Prior to Admission medications    Medication Sig Start Date End Date Taking?  Authorizing Provider   ibuprofen (ADVIL;MOTRIN) 600 MG tablet Take 1 tablet by mouth every 6 hours as needed for Pain 10/11/21  Yes Bipin Harris, DO   ondansetron (ZOFRAN-ODT) 4 MG disintegrating tablet Take 1 tablet by mouth 3 times daily as needed for Nausea or Vomiting 10/11/21  Yes Bipin Cisseg, DO   ferrous sulfate (IRON 325) 325 (65 Fe) MG tablet Take 1 tablet by mouth 2 times daily 9/8/21  Yes DEAN Yates NP   Blood Glucose Monitoring Suppl (TRUE METRIX METER) w/Device KIT use four times a day to [address] [place] [address] 7/6/21  Yes Historical Provider, MD   levothyroxine (SYNTHROID) 100 MCG tablet take 1 tablet by mouth once daily 7/2/21  Yes DEAN Ordaz CNP   acetaminophen (APAP EXTRA STRENGTH) 500 MG tablet Take 2 tablets by mouth every 6 hours as needed for Pain 5/11/21  Yes Seda Louise   Prenatal Vit-Fe Fumarate-FA (PRENATAL VITAMINS PLUS PO) Take by mouth   Yes Historical Provider, MD   albuterol sulfate  (90 Base) MCG/ACT inhaler Inhale 2 puffs into the lungs every 6 hours as needed for Wheezing or Shortness of Breath 12/18/19  Yes Cranford Riedel, MD   omeprazole (PRILOSEC) 20 MG delayed release capsule Take 1 capsule by mouth 2 times daily  Patient not taking: Reported on 11/16/2021 11/10/21   Cranford Riedel, MD   dicyclomine (BENTYL) 10 MG capsule Take 1 capsule by mouth 4 times daily  Patient not taking: Reported on 11/16/2021 11/10/21   Cranford Riedel, MD   levothyroxine (SYNTHROID) 25 MCG tablet Take 1 tablet by mouth daily 8/10/21 9/9/21  DEAN Ordaz CNP       Allergies  Latex, Other, Nut [peanut-containing drug products], and Peanut (diagnostic)      Review of Systems:  General: Denies any fever, chills. Eyes: Denies any changes in vision, diplopia or eye pain  Ears, Nose, Mouth: Denies changes in hearing/tinnitus or drainage from ears, no rhinorrhea or bloody nose, no difficulty chewing  Throat: no difficulty swallowing, no throat pain  Respiratory: Denies any shortness of breath or cough.   Cardiac: Denies any chest pain, palpitations, claudication or edema. Gastrointestinal: Right upper quadrant and epigastric pain     Genitourinary: Denies any frequency, urgency, hesitancy or incontinence. Musculoskeletal: Denies worsening muscle weakness or recent trauma  Skin: Denies rashes or lesions  Psychiatric: Denies any recent changes in mood or affect  Hematologic: Denies bruising or bleeding easily. PHYSICAL EXAMINATION  Vitals:   Vitals:    11/16/21 0853   Pulse: 90   Resp: 12   SpO2: 100%       General Appearance:  awake, alert, no acute distress, well developed, well nourished   Skin:  Skin color, texture, turgor normal. No rashes or lesions. Head/face:  NCAT, face symmetrical  Eyes:  PERRL, no evidence of conjunctivitis or ptosis bilaterally  Ears:  External ears and canals grossly normal, no evidence of otorrhea. Nose/Sinuses:  Nares normal. Septum midline. Mucosa normal. No external drainage noted. Mouth/Neck:  Mucosa moist.  No external oral lesions. Trachea midline. No visible masses. Lungs:  Normal chest expansion, unlabored breathing without accessory muscle use. No audible rales, rhonchi, or wheezing. Cardiovascular: S1S2. No evidence of JVD. No evidence of pulsatile masses in abdomen  Abdomen:  Soft, tender right upper quadrant to deep palpation  Musculoskeletal: No evidence of bony/muscular deformities, trauma, atrophy of either left/right upper/lower extremity. No evidence of digital clubbing or cyanosis. Neurologic:  CN 2-12 grossly intact without obvious deficits. Grossly normal sensation in all extremities.   Psychiatric: appropriate judgement and insight, appropriate recall of recent and remote memory, no evidence of depression/anxiety/agitation    RADIOLOGY:  The following images and/or reports were personally reviewed with the following significant findings pertinent to the Chief Complaint and/or HPI:    NM HEPATOBILIARY    Result Date: 11/15/2021  EXAMINATION: NUCLEAR MEDICINE HEPATOBILIARY SCINTIGRAPHY (HIDA SCAN) WITH EJECTION FRACTION. TECHNIQUE: Approximately 5 millicuries PD76Y Mebrofenin (Choletec) was administered IV. Then, dynamic images of the abdomen were obtained in the anterior projection for 60 mins. A right lateral view was also obtained at 60 mins. Due to a shortage/inavailability of CCK, one can (237 ml) Ensure plus was substitued orally. Images were obtained in the [place] projection and regions of interest were drawn around the gallbladder and ejection fraction was calculated. COMPARISON: No prior for comparison. HISTORY: ORDERING SYSTEM PROVIDED HISTORY: Right upper quadrant pain TECHNOLOGIST PROVIDED HISTORY: pain Is the patient pregnant?->No Reason for Exam: right upper quadrant pain Acuity: Unknown Type of Exam: Unknown FINDINGS: Upon injection of radiopharmaceutical, there is prompt visualization of the liver. Gallbladder is visualized by 20 minutes and progressively fills during the 1st hour of imaging. Activity is seen within small bowel after gallbladder stimulation. Gallbladder ejection fraction measured 15%. Normal value is >33% for Ensure protocol. Note, Ensure normal range is based on a limited study. No acute cholecystitis. Gallbladder ejection fraction is low, which can be due to functional gallbladder disorder or incomplete absorption of the fatty meal.     CT ABDOMEN PELVIS W IV CONTRAST Additional Contrast? None    Result Date: 10/28/2021  EXAMINATION: CT OF THE ABDOMEN AND PELVIS WITH CONTRAST 10/28/2021 2:17 am TECHNIQUE: CT of the abdomen and pelvis was performed with the administration of intravenous contrast. Multiplanar reformatted images are provided for review. Dose modulation, iterative reconstruction, and/or weight based adjustment of the mA/kV was utilized to reduce the radiation dose to as low as reasonably achievable.  COMPARISON: 11/09/2020 HISTORY: ORDERING SYSTEM PROVIDED HISTORY: abd pain TECHNOLOGIST PROVIDED HISTORY: [facility] pain Decision Support Exception - unselect if not a suspected or confirmed emergency medical condition->Emergency Medical Condition (MA) Reason for Exam: Abd pain, 2 weeks post delivery Acuity: Unknown Type of Exam: Unknown FINDINGS: Lower Chest:  Visualized portion of the lower chest demonstrates no acute abnormality. Organs:  Liver enhances normally without evidence of intrahepatic biliary ductal dilatation. The gallbladder is unremarkable. The spleen, pancreas and adrenal glands are unremarkable. The kidneys and visualized ureters are unremarkable. GI/Bowel: Stomach and duodenal sweep demonstrate no acute abnormality. There is no evidence of bowel obstruction. No evidence of abnormal bowel wall thickening or distension. The appendix is visualized and is unremarkable. No evidence of acute appendicitis. Pelvis: The bladder is unremarkable. Post partum uterus status post  noted. No unexpected finding. Peritoneum/Retroperitoneum: No evidence of ascites or free air. No evidence of lymphadenopathy. Aorta is normal in caliber. Bones/Soft Tissues: Surrounding osseous and soft tissue structures are unremarkable status post . Unremarkable postcontrast CT of the abdomen and pelvis 2 weeks status post . No concerning finding to explain the patient's symptoms. US Ed Fast Abdomen Limited    Result Date: 10/31/2021  POCUS_Biliary:     Exam Information:         Exam type:  Clinically indicated     Indication(s) for Exam:         The exam was performed with the following indications[de-identified]  Abdominal Pain     Views Obtained & Images Saved for These Views:          The following structures were examined and images saved for these views[de-identified]  Gallbladder - Long Axis, Gallbladder - Short Axis, Anterior Wall Diameter     Findings:         Gallstones:  Absent         Sludge:  Present         Sonographic Farrell's sign:  Absent         Pericholecystic fluid:  Absent         GB wall thickening?:  Absent         Maximal GB wall thickness in transverse (mm) =:  2.3     Interpretation:         Other:  Performed on 10/28/21 @ 00:10 by Dr. Leopold Sensor. Interpreted by Dr. Asha Francis. Gallbladder overall distended, but normal GB wall thickness 0.23 cm. Confirmatory study:         What confirmatory study was done?:  CT         Confirmatory study findings[de-identified]  Normal CT scan Electronically signed by Nel Pereira on Sunday, October 31, 2021 at 5:33 PM      DIAGNOSES:   Diagnosis Orders   1. Symptomatic cholelithiasis     2. Dysfunctional gallbladder         PLAN:  · We discussed nonoperative management versus surgical intervention, patient would like to proceed with cholecystectomy. Risks include bleeding, infection, scarring, bile leak, damage to surrounding tissues, chance of damage to the common bile duct, risk of subtotal cholecystectomy, conversion to open procedure, need for further surgery or procedures. Benefits, alternatives, complications and procedure details were explained to the patient, all questions were answered.   ·       Medical Decision Making: moderate complexity and risk    Electronically signed by Alexander Mcfarlane DO on 11/16/2021 at 10:55 AM

## 2021-11-16 NOTE — TELEPHONE ENCOUNTER
11/16/2021- Spoke to patient. Surgery scheduled at [address]. Patient confirmed.      Surgery date/time: 11/23/2021 at 7:30am  Arrival time:6am  Covid test date: 11/19/2021 at 2:30pm at [place] [id number] op appt: 12/13 at 10:40am at [facility] livia Harris

## 2021-11-19 ENCOUNTER — HOSPITAL ENCOUNTER (OUTPATIENT)
Dept: LAB | Age: 31
Setting detail: SPECIMEN
Discharge: HOME | End: 2021-11-19
Payer: MEDICARE

## 2021-11-19 DIAGNOSIS — Z01.818 PREOP TESTING: Primary | ICD-10-CM

## 2021-11-19 PROCEDURE — U0005 INFEC AGEN DETEC AMPLI PROBE: HCPCS

## 2021-11-19 PROCEDURE — U0003 INFECTIOUS AGENT DETECTION BY NUCLEIC ACID (DNA OR RNA); SEVERE ACUTE RESPIRATORY SYNDROME CORONAVIRUS 2 (SARS-COV-2) (CORONAVIRUS DISEASE [COVID-19]), AMPLIFIED PROBE TECHNIQUE, MAKING USE OF HIGH THROUGHPUT TECHNOLOGIES AS DESCRIBED BY CMS-2020-01-R: HCPCS

## 2021-11-20 LAB
SARS-COV-2: NORMAL
SARS-COV-2: NOT DETECTED
SOURCE: NORMAL

## 2021-11-23 ENCOUNTER — ANESTHESIA EVENT (OUTPATIENT)
Dept: OPERATING ROOM | Age: 31
End: 2021-11-23
Payer: MEDICARE

## 2021-11-23 ENCOUNTER — ANESTHESIA (OUTPATIENT)
Dept: OPERATING ROOM | Age: 31
End: 2021-11-23
Payer: MEDICARE

## 2021-11-23 ENCOUNTER — HOSPITAL ENCOUNTER (OUTPATIENT)
Age: 31
Setting detail: OUTPATIENT SURGERY
Discharge: HOME | End: 2021-11-23
Attending: SURGERY | Admitting: SURGERY
Payer: MEDICARE

## 2021-11-23 VITALS
HEART RATE: 75 BPM | BODY MASS INDEX: 32.51 KG/M2 | TEMPERATURE: 97.3 F | SYSTOLIC BLOOD PRESSURE: 137 MMHG | RESPIRATION RATE: 12 BRPM | WEIGHT: 227.07 LBS | HEIGHT: 70 IN | DIASTOLIC BLOOD PRESSURE: 84 MMHG | OXYGEN SATURATION: 99 %

## 2021-11-23 VITALS — DIASTOLIC BLOOD PRESSURE: 61 MMHG | SYSTOLIC BLOOD PRESSURE: 115 MMHG | TEMPERATURE: 95.7 F | OXYGEN SATURATION: 100 %

## 2021-11-23 DIAGNOSIS — K82.8 DYSFUNCTIONAL GALLBLADDER: Primary | ICD-10-CM

## 2021-11-23 PROCEDURE — 6370000000 HC RX 637 (ALT 250 FOR IP): Performed by: ANESTHESIOLOGY

## 2021-11-23 PROCEDURE — 3700000000 HC ANESTHESIA ATTENDED CARE: Performed by: SURGERY

## 2021-11-23 PROCEDURE — 2500000003 HC RX 250 WO HCPCS: Performed by: NURSE ANESTHETIST, CERTIFIED REGISTERED

## 2021-11-23 PROCEDURE — 3600000019 HC SURGERY ROBOT ADDTL 15MIN: Performed by: SURGERY

## 2021-11-23 PROCEDURE — 7100000001 HC PACU RECOVERY - ADDTL 15 MIN: Performed by: SURGERY

## 2021-11-23 PROCEDURE — 3600000009 HC SURGERY ROBOT BASE: Performed by: SURGERY

## 2021-11-23 PROCEDURE — 2709999900 HC NON-CHARGEABLE SUPPLY: Performed by: SURGERY

## 2021-11-23 PROCEDURE — 7100000000 HC PACU RECOVERY - FIRST 15 MIN: Performed by: SURGERY

## 2021-11-23 PROCEDURE — 6360000002 HC RX W HCPCS: Performed by: NURSE ANESTHETIST, CERTIFIED REGISTERED

## 2021-11-23 PROCEDURE — 2500000003 HC RX 250 WO HCPCS: Performed by: SURGERY

## 2021-11-23 PROCEDURE — 6360000002 HC RX W HCPCS: Performed by: SURGERY

## 2021-11-23 PROCEDURE — 6360000002 HC RX W HCPCS: Performed by: ANESTHESIOLOGY

## 2021-11-23 PROCEDURE — 7100000010 HC PHASE II RECOVERY - FIRST 15 MIN: Performed by: SURGERY

## 2021-11-23 PROCEDURE — 2580000003 HC RX 258: Performed by: ANESTHESIOLOGY

## 2021-11-23 PROCEDURE — 47562 LAPAROSCOPIC CHOLECYSTECTOMY: CPT | Performed by: SURGERY

## 2021-11-23 PROCEDURE — 3700000001 HC ADD 15 MINUTES (ANESTHESIA): Performed by: SURGERY

## 2021-11-23 PROCEDURE — 7100000011 HC PHASE II RECOVERY - ADDTL 15 MIN: Performed by: SURGERY

## 2021-11-23 PROCEDURE — S2900 ROBOTIC SURGICAL SYSTEM: HCPCS | Performed by: SURGERY

## 2021-11-23 PROCEDURE — 88304 TISSUE EXAM BY PATHOLOGIST: CPT

## 2021-11-23 RX ORDER — SCOLOPAMINE TRANSDERMAL SYSTEM 1 MG/1
1 PATCH, EXTENDED RELEASE TRANSDERMAL ONCE
Status: DISCONTINUED | OUTPATIENT
Start: 2021-11-23 | End: 2021-11-23 | Stop reason: HOSPADM

## 2021-11-23 RX ORDER — MIDAZOLAM HYDROCHLORIDE 1 MG/ML
INJECTION INTRAMUSCULAR; INTRAVENOUS PRN
Status: DISCONTINUED | OUTPATIENT
Start: 2021-11-23 | End: 2021-11-23 | Stop reason: SDUPTHER

## 2021-11-23 RX ORDER — ONDANSETRON 2 MG/ML
4 INJECTION INTRAMUSCULAR; INTRAVENOUS
Status: COMPLETED | OUTPATIENT
Start: 2021-11-23 | End: 2021-11-23

## 2021-11-23 RX ORDER — SODIUM CHLORIDE 0.9 % (FLUSH) 0.9 %
10 SYRINGE (ML) INJECTION EVERY 12 HOURS SCHEDULED
Status: DISCONTINUED | OUTPATIENT
Start: 2021-11-23 | End: 2021-11-23 | Stop reason: HOSPADM

## 2021-11-23 RX ORDER — FENTANYL CITRATE 50 UG/ML
25 INJECTION, SOLUTION INTRAMUSCULAR; INTRAVENOUS EVERY 5 MIN PRN
Status: DISCONTINUED | OUTPATIENT
Start: 2021-11-23 | End: 2021-11-23 | Stop reason: HOSPADM

## 2021-11-23 RX ORDER — SODIUM CHLORIDE 0.9 % (FLUSH) 0.9 %
10 SYRINGE (ML) INJECTION PRN
Status: DISCONTINUED | OUTPATIENT
Start: 2021-11-23 | End: 2021-11-23 | Stop reason: HOSPADM

## 2021-11-23 RX ORDER — ROCURONIUM BROMIDE 10 MG/ML
INJECTION, SOLUTION INTRAVENOUS PRN
Status: DISCONTINUED | OUTPATIENT
Start: 2021-11-23 | End: 2021-11-23 | Stop reason: SDUPTHER

## 2021-11-23 RX ORDER — LIDOCAINE HYDROCHLORIDE 10 MG/ML
1 INJECTION, SOLUTION EPIDURAL; INFILTRATION; INTRACAUDAL; PERINEURAL
Status: DISCONTINUED | OUTPATIENT
Start: 2021-11-23 | End: 2021-11-23 | Stop reason: HOSPADM

## 2021-11-23 RX ORDER — PROMETHAZINE HYDROCHLORIDE 25 MG/ML
6.25 INJECTION, SOLUTION INTRAMUSCULAR; INTRAVENOUS
Status: DISCONTINUED | OUTPATIENT
Start: 2021-11-23 | End: 2021-11-23 | Stop reason: HOSPADM

## 2021-11-23 RX ORDER — DOCUSATE SODIUM 100 MG/1
100 CAPSULE, LIQUID FILLED ORAL 2 TIMES DAILY
Qty: 20 CAPSULE | Refills: 0 | Status: SHIPPED | OUTPATIENT
Start: 2021-11-23 | End: 2022-02-07 | Stop reason: ALTCHOICE

## 2021-11-23 RX ORDER — SODIUM CHLORIDE, SODIUM LACTATE, POTASSIUM CHLORIDE, CALCIUM CHLORIDE 600; 310; 30; 20 MG/100ML; MG/100ML; MG/100ML; MG/100ML
INJECTION, SOLUTION INTRAVENOUS CONTINUOUS
Status: DISCONTINUED | OUTPATIENT
Start: 2021-11-23 | End: 2021-11-23 | Stop reason: HOSPADM

## 2021-11-23 RX ORDER — HYDROCODONE BITARTRATE AND ACETAMINOPHEN 5; 325 MG/1; MG/1
1 TABLET ORAL PRN
Status: COMPLETED | OUTPATIENT
Start: 2021-11-23 | End: 2021-11-23

## 2021-11-23 RX ORDER — DEXAMETHASONE SODIUM PHOSPHATE 10 MG/ML
INJECTION INTRAMUSCULAR; INTRAVENOUS PRN
Status: DISCONTINUED | OUTPATIENT
Start: 2021-11-23 | End: 2021-11-23 | Stop reason: SDUPTHER

## 2021-11-23 RX ORDER — SODIUM CHLORIDE 9 MG/ML
INJECTION, SOLUTION INTRAVENOUS CONTINUOUS
Status: DISCONTINUED | OUTPATIENT
Start: 2021-11-23 | End: 2021-11-23 | Stop reason: HOSPADM

## 2021-11-23 RX ORDER — HYDROCODONE BITARTRATE AND ACETAMINOPHEN 5; 325 MG/1; MG/1
1 TABLET ORAL EVERY 6 HOURS PRN
Qty: 15 TABLET | Refills: 0 | Status: SHIPPED | OUTPATIENT
Start: 2021-11-23 | End: 2021-11-30

## 2021-11-23 RX ORDER — FENTANYL CITRATE 50 UG/ML
INJECTION, SOLUTION INTRAMUSCULAR; INTRAVENOUS PRN
Status: DISCONTINUED | OUTPATIENT
Start: 2021-11-23 | End: 2021-11-23 | Stop reason: SDUPTHER

## 2021-11-23 RX ORDER — FENTANYL CITRATE 50 UG/ML
50 INJECTION, SOLUTION INTRAMUSCULAR; INTRAVENOUS EVERY 5 MIN PRN
Status: COMPLETED | OUTPATIENT
Start: 2021-11-23 | End: 2021-11-23

## 2021-11-23 RX ORDER — LIDOCAINE HYDROCHLORIDE 20 MG/ML
INJECTION, SOLUTION EPIDURAL; INFILTRATION; INTRACAUDAL; PERINEURAL PRN
Status: DISCONTINUED | OUTPATIENT
Start: 2021-11-23 | End: 2021-11-23 | Stop reason: SDUPTHER

## 2021-11-23 RX ORDER — ONDANSETRON 4 MG/1
4 TABLET, FILM COATED ORAL EVERY 8 HOURS PRN
Qty: 10 TABLET | Refills: 0 | Status: SHIPPED | OUTPATIENT
Start: 2021-11-23 | End: 2021-11-28

## 2021-11-23 RX ORDER — ONDANSETRON 2 MG/ML
INJECTION INTRAMUSCULAR; INTRAVENOUS PRN
Status: DISCONTINUED | OUTPATIENT
Start: 2021-11-23 | End: 2021-11-23 | Stop reason: SDUPTHER

## 2021-11-23 RX ORDER — PROPOFOL 10 MG/ML
INJECTION, EMULSION INTRAVENOUS PRN
Status: DISCONTINUED | OUTPATIENT
Start: 2021-11-23 | End: 2021-11-23 | Stop reason: SDUPTHER

## 2021-11-23 RX ORDER — PHENYLEPHRINE HCL IN 0.9% NACL 1 MG/10 ML
SYRINGE (ML) INTRAVENOUS PRN
Status: DISCONTINUED | OUTPATIENT
Start: 2021-11-23 | End: 2021-11-23 | Stop reason: SDUPTHER

## 2021-11-23 RX ORDER — SODIUM CHLORIDE 9 MG/ML
25 INJECTION, SOLUTION INTRAVENOUS PRN
Status: DISCONTINUED | OUTPATIENT
Start: 2021-11-23 | End: 2021-11-23 | Stop reason: HOSPADM

## 2021-11-23 RX ORDER — KETOROLAC TROMETHAMINE 30 MG/ML
INJECTION, SOLUTION INTRAMUSCULAR; INTRAVENOUS PRN
Status: DISCONTINUED | OUTPATIENT
Start: 2021-11-23 | End: 2021-11-23 | Stop reason: SDUPTHER

## 2021-11-23 RX ORDER — HYDROCODONE BITARTRATE AND ACETAMINOPHEN 5; 325 MG/1; MG/1
2 TABLET ORAL PRN
Status: COMPLETED | OUTPATIENT
Start: 2021-11-23 | End: 2021-11-23

## 2021-11-23 RX ADMIN — CEFAZOLIN 2000 MG: 10 INJECTION, POWDER, FOR SOLUTION INTRAVENOUS at 07:28

## 2021-11-23 RX ADMIN — PROPOFOL 25 MCG/KG/MIN: 10 INJECTION, EMULSION INTRAVENOUS at 07:40

## 2021-11-23 RX ADMIN — FENTANYL CITRATE 50 MCG: 0.05 INJECTION, SOLUTION INTRAMUSCULAR; INTRAVENOUS at 09:17

## 2021-11-23 RX ADMIN — FENTANYL CITRATE 50 MCG: 0.05 INJECTION, SOLUTION INTRAMUSCULAR; INTRAVENOUS at 09:37

## 2021-11-23 RX ADMIN — Medication 100 MCG: at 07:34

## 2021-11-23 RX ADMIN — SODIUM CHLORIDE, POTASSIUM CHLORIDE, SODIUM LACTATE AND CALCIUM CHLORIDE: 600; 310; 30; 20 INJECTION, SOLUTION INTRAVENOUS at 07:28

## 2021-11-23 RX ADMIN — Medication 100 MCG: at 08:00

## 2021-11-23 RX ADMIN — MIDAZOLAM 2 MG: 1 INJECTION INTRAMUSCULAR; INTRAVENOUS at 07:28

## 2021-11-23 RX ADMIN — ONDANSETRON 4 MG: 2 INJECTION INTRAMUSCULAR; INTRAVENOUS at 09:28

## 2021-11-23 RX ADMIN — HYDROCODONE BITARTRATE AND ACETAMINOPHEN 2 TABLET: 5; 325 TABLET ORAL at 09:48

## 2021-11-23 RX ADMIN — ROCURONIUM BROMIDE 40 MG: 10 INJECTION, SOLUTION INTRAVENOUS at 07:34

## 2021-11-23 RX ADMIN — FENTANYL CITRATE 50 MCG: 0.05 INJECTION, SOLUTION INTRAMUSCULAR; INTRAVENOUS at 09:24

## 2021-11-23 RX ADMIN — FENTANYL CITRATE 50 MCG: 0.05 INJECTION, SOLUTION INTRAMUSCULAR; INTRAVENOUS at 10:10

## 2021-11-23 RX ADMIN — KETOROLAC TROMETHAMINE 30 MG: 30 INJECTION, SOLUTION INTRAMUSCULAR; INTRAVENOUS at 08:25

## 2021-11-23 RX ADMIN — SUGAMMADEX 200 MG: 100 INJECTION, SOLUTION INTRAVENOUS at 08:30

## 2021-11-23 RX ADMIN — LIDOCAINE HYDROCHLORIDE 100 MG: 20 INJECTION, SOLUTION EPIDURAL; INFILTRATION; INTRACAUDAL; PERINEURAL at 07:34

## 2021-11-23 RX ADMIN — ONDANSETRON 4 MG: 2 INJECTION, SOLUTION INTRAMUSCULAR; INTRAVENOUS at 08:25

## 2021-11-23 RX ADMIN — PROPOFOL 200 MG: 10 INJECTION, EMULSION INTRAVENOUS at 07:34

## 2021-11-23 RX ADMIN — DEXAMETHASONE SODIUM PHOSPHATE 10 MG: 10 INJECTION INTRAMUSCULAR; INTRAVENOUS at 07:40

## 2021-11-23 ASSESSMENT — PULMONARY FUNCTION TESTS
PIF_VALUE: 23
PIF_VALUE: 17
PIF_VALUE: 19
PIF_VALUE: 0
PIF_VALUE: 16
PIF_VALUE: 24
PIF_VALUE: 24
PIF_VALUE: 20
PIF_VALUE: 24
PIF_VALUE: 24
PIF_VALUE: 19
PIF_VALUE: 0
PIF_VALUE: 24
PIF_VALUE: 17
PIF_VALUE: 23
PIF_VALUE: 22
PIF_VALUE: 18
PIF_VALUE: 23
PIF_VALUE: 16
PIF_VALUE: 1
PIF_VALUE: 18
PIF_VALUE: 16
PIF_VALUE: 0
PIF_VALUE: 19
PIF_VALUE: 15
PIF_VALUE: 15
PIF_VALUE: 16
PIF_VALUE: 3
PIF_VALUE: 17
PIF_VALUE: 17
PIF_VALUE: 24
PIF_VALUE: 24
PIF_VALUE: 23
PIF_VALUE: 24
PIF_VALUE: 19
PIF_VALUE: 24
PIF_VALUE: 19
PIF_VALUE: 23
PIF_VALUE: 24
PIF_VALUE: 0
PIF_VALUE: 24
PIF_VALUE: 23
PIF_VALUE: 21
PIF_VALUE: 23
PIF_VALUE: 23
PIF_VALUE: 16
PIF_VALUE: 24
PIF_VALUE: 17
PIF_VALUE: 17
PIF_VALUE: 16
PIF_VALUE: 24
PIF_VALUE: 24
PIF_VALUE: 23
PIF_VALUE: 16
PIF_VALUE: 19
PIF_VALUE: 24
PIF_VALUE: 24
PIF_VALUE: 16
PIF_VALUE: 17
PIF_VALUE: 24
PIF_VALUE: 17
PIF_VALUE: 24
PIF_VALUE: 19
PIF_VALUE: 23
PIF_VALUE: 17
PIF_VALUE: 1
PIF_VALUE: 23
PIF_VALUE: 19
PIF_VALUE: 17
PIF_VALUE: 23
PIF_VALUE: 18
PIF_VALUE: 2
PIF_VALUE: 24
PIF_VALUE: 17

## 2021-11-23 ASSESSMENT — PAIN DESCRIPTION - LOCATION
LOCATION: ABDOMEN
LOCATION: BACK;ABDOMEN
LOCATION: ABDOMEN;BACK

## 2021-11-23 ASSESSMENT — PAIN DESCRIPTION - PROGRESSION: CLINICAL_PROGRESSION: GRADUALLY WORSENING

## 2021-11-23 ASSESSMENT — PAIN SCALES - GENERAL
PAINLEVEL_OUTOF10: 8
PAINLEVEL_OUTOF10: 10

## 2021-11-23 ASSESSMENT — PAIN DESCRIPTION - ONSET: ONSET: GRADUAL

## 2021-11-23 ASSESSMENT — ENCOUNTER SYMPTOMS: SHORTNESS OF BREATH: 0

## 2021-11-23 ASSESSMENT — PAIN DESCRIPTION - PAIN TYPE
TYPE: ACUTE PAIN;SURGICAL PAIN
TYPE: ACUTE PAIN;SURGICAL PAIN
TYPE: SURGICAL PAIN

## 2021-11-23 ASSESSMENT — PAIN DESCRIPTION - FREQUENCY: FREQUENCY: CONTINUOUS

## 2021-11-23 ASSESSMENT — PAIN DESCRIPTION - DESCRIPTORS: DESCRIPTORS: ACHING

## 2021-11-23 ASSESSMENT — PAIN - FUNCTIONAL ASSESSMENT: PAIN_FUNCTIONAL_ASSESSMENT: 0-10

## 2021-11-23 NOTE — ANESTHESIA POSTPROCEDURE EVALUATION
Department of Anesthesiology  Postprocedure Note    Patient: Santiago Segura  MRN: [id number]  YOB: 1990  Date of evaluation: 11/23/2021  Time:  3:21 PM     Procedure Summary     Date: 11/23/21 Room / Location: [address] [place] [address] / [facility] - INPATIENT    Anesthesia Start: 4930 Anesthesia Stop: 7587    Procedure: CHOLECYSTECTOMY LAPAROSCOPIC ROBOTIC XI (N/A Abdomen) Diagnosis: (DX DYSFUNCTIONAL GALLBLADDER)    Surgeons: Mingo Trinh DO Responsible Provider: Johanne Nava MD    Anesthesia Type: general ASA Status: 2          Anesthesia Type: general    Caridad Phase I: Caridad Score: 10    Caridad Phase II: Caridad Score: 10    Last vitals: Reviewed and per EMR flowsheets.        Anesthesia Post Evaluation    Complications: no

## 2021-11-23 NOTE — ANESTHESIA PRE PROCEDURE
Department of Anesthesiology  Preprocedure Note       Name:  Karla Dalal   Age:  27 y.o.  :  1990                                          MRN:  [id number]         Date:  2021      Surgeon: Scooby Query):  Laurie Sykes DO    Procedure: Procedure(s):  CHOLECYSTECTOMY LAPAROSCOPIC ROBOTIC XI    Medications prior to admission:   Prior to Admission medications    Medication Sig Start Date End Date Taking?  Authorizing Provider   albuterol sulfate  (90 Base) MCG/ACT inhaler Inhale 2 puffs into the lungs every 6 hours as needed for Wheezing or Shortness of Breath 19  Yes Filiberto Calderon MD   omeprazole (PRILOSEC) 20 MG delayed release capsule Take 1 capsule by mouth 2 times daily  Patient not taking: Reported on 2021 11/10/21   Filiberto Calderon MD   dicyclomine (BENTYL) 10 MG capsule Take 1 capsule by mouth 4 times daily  Patient not taking: Reported on 2021 11/10/21   Filiberto Calderon MD   ibuprofen (ADVIL;MOTRIN) 600 MG tablet Take 1 tablet by mouth every 6 hours as needed for Pain 10/11/21   Petar Patel,    ondansetron (ZOFRAN-ODT) 4 MG disintegrating tablet Take 1 tablet by mouth 3 times daily as needed for Nausea or Vomiting 10/11/21   Petar Patel,    ferrous sulfate (IRON 325) 325 (65 Fe) MG tablet Take 1 tablet by mouth 2 times daily 21   DEAN Ramos NP   levothyroxine (SYNTHROID) 25 MCG tablet Take 1 tablet by mouth daily 8/10/21 9/9/21  DEAN Fleming CNP   Blood Glucose Monitoring Suppl (TRUE METRIX METER) w/Device KIT use four times a day to [address] [place] [address] 21   Historical Provider, MD   levothyroxine (SYNTHROID) 100 MCG tablet take 1 tablet by mouth once daily 21   DEAN Fleming CNP   acetaminophen (APAP EXTRA STRENGTH) 500 MG tablet Take 2 tablets by mouth every 6 hours as needed for Pain 21   Seda PHILLIPS Louise   Prenatal Vit-Fe Fumarate-FA (PRENATAL VITAMINS PLUS PO) Take by mouth    Historical Provider, MD       Current medications:    Current Facility-Administered Medications   Medication Dose Route Frequency Provider Last Rate Last Admin    0.9 % sodium chloride infusion   IntraVENous Continuous Ndal Marco A Murillo MD        lactated ringers infusion   IntraVENous Continuous Gaurang Murillo MD        sodium chloride flush 0.9 % injection 10 mL  10 mL IntraVENous 2 times per day Lacho Preston MD        sodium chloride flush 0.9 % injection 10 mL  10 mL IntraVENous PRN Lacho Preston MD        0.9 % sodium chloride infusion  25 mL IntraVENous PRN Lacho Preston MD        lidocaine PF 1 % injection 1 mL  1 mL IntraDERmal Once PRN Lacho Preston MD        ceFAZolin (ANCEF) 2000 mg in dextrose 5 % 50 mL IVPB  2,000 mg IntraVENous [facility]            Allergies:     Allergies   Allergen Reactions    Latex Hives and Swelling    Other Shortness Of Breath     Bleach causes reactive airway disease    Nut [Peanut-Containing Drug Products] Nausea And Vomiting    Peanut (Diagnostic)      Other reaction(s): Nausea And Vomiting       Problem List:    Patient Active Problem List   Diagnosis Code    Reactive airway disease J45.909    Allergic sinusitis J30.9    Psoriasis L40.9    Neck pain M54.2    Hypothyroidism E03.9    Chronic fatigue R53.82    Seasonal allergic rhinitis due to pollen J30.1    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    Positive TEO (antinuclear antibody) R76.8    Lumbar disc herniation M51.26    Lumbar radiculopathy M54.16    Prediabetes R73.03    Lumbar spondylosis M47.816    Spondylolisthesis at L4-L5 level M43.16    Right ovarian cyst N83.201    Class 2 obesity due to excess calories without serious comorbidity with body mass index (BMI) of 35.0 to 35.9 in adult E66.09, Z68.35    Postauricular adenopathy R59.0    Sacroiliitis, not elsewhere classified (HCC) M46.1    Dizziness R42    POTS (postural orthostatic tachycardia syndrome) I49.8    CSF leak G96.00    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  Double vision H53.2    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    H/O:   Z98.891     (vaginal birth after ) X 2 ( and ) O31.200    Family history of congenital heart defect Z82.79    Family history of Down syndrome Z82.79    Family history of diabetes mellitus in father Z80.1    History of Chiari malformation Z86.69    History of back surgery Z98.890    Tarlov cysts G96.191    Pregestational Diabetes R73.09    cHTN (no meds) R03.0    RLTCS w/ RRS (gen) 10/11/21 M Apg 8/8 Wt 7#1 O80    Patient-requested procedure Z41.9    BMI 35.0-35.9,adult Z68.35       Past Medical History:        Diagnosis Date    Allergic rhinitis     Class 2 obesity due to excess calories without serious comorbidity with body mass index (BMI) of 35.0 to 35.9 in adult     Complication of anesthesia     epidural stopped working    History of Chiari malformation     Hypothyroidism     Migraines     Neuropathy     Osteoarthritis     POTS (postural orthostatic tachycardia syndrome)     Psoriasis     RAD (reactive airway disease)     triggered by bleach only    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     Tarlov cysts     [place] surgically in [place]    Thyroid disease     Vasovagal syncope     Wears glasses        Past Surgical History:        Procedure Laterality Date    BACK SURGERY  2020    reconstructive, 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    SPINE SURGERY      sacral cysts and then several surgeries to correct spinal fluid leaks    US DRAIN SOFT TISSUE ABSCESS  10/26/2020    US DRAIN SOFT TISSUE ABSCESS 10/26/2020 [facility]TRAS[facility]       Social History:    Social History     Tobacco Use    Smoking status: Never Smoker    Smokeless tobacco: Never Used   Substance Use Topics    Alcohol use: No                                Counseling given: Not Answered      Vital Signs (Current):   Vitals:    11/23/21 0613 11/23/21 0618   BP:  127/78   Pulse:  76   Resp:  18   Temp:  97.3 °F (36.3 °C)   TempSrc:  Temporal   SpO2:  100%   Weight: 227 lb 1.2 oz (103 kg)    Height: 5' 10\" (1.778 m)                                               BP Readings from Last 3 Encounters:   11/23/21 127/78   10/27/21 124/88   10/25/21 124/78       NPO Status: Time of last liquid consumption: 2200                        Time of last solid consumption: 2000                        Date of last liquid consumption: 11/22/21                        Date of last solid food consumption: 11/22/21    BMI:   Wt Readings from Last 3 Encounters:   11/23/21 227 lb 1.2 oz (103 kg)   11/15/21 220 lb (99.8 kg)   11/16/21 220 lb (99.8 kg)     Body mass index is 32.58 kg/m². CBC:   Lab Results   Component Value Date    WBC 6.7 10/28/2021    RBC 4.61 10/28/2021    HGB 12.6 10/28/2021    HCT 40.0 10/28/2021    MCV 86.8 10/28/2021    RDW 13.2 10/28/2021     10/28/2021     10/01/2012       CMP:   Lab Results   Component Value Date     10/28/2021    K 4.0 10/28/2021     10/28/2021    CO2 25 10/28/2021    BUN 16 10/28/2021    CREATININE 1.03 10/28/2021    GFRAA >60 10/28/2021    LABGLOM >60 10/28/2021    GLUCOSE 107 10/28/2021    PROT 7.0 10/28/2021    CALCIUM 9.6 10/28/2021    BILITOT 0.51 10/28/2021    ALKPHOS 117 10/28/2021    AST 16 10/28/2021    ALT 17 10/28/2021       POC Tests: No results for input(s): POCGLU, POCNA, POCK, POCCL, POCBUN, POCHEMO, POCHCT in the last 72 hours.     Coags:   Lab Results   Component Value Date    PROTIME 9.6 10/26/2020    INR 0.9 10/26/2020    APTT 23.7 10/26/2020       HCG (If Applicable):   Lab Results   Component Value Date    HCG NEGATIVE 06/20/2019    HCGQUANT 75,055 (H) 03/22/2021        ABGs: No results found for: PHART, PO2ART, CVE8CPS, MBR3VWJ, BEART, Y9LWVZUG     Type & Screen (If Applicable):  No results found for: LABABO, LABRH    Drug/Infectious Status (If Applicable):  Lab Results   Component Value Date    HEPCAB NONREACTIVE 03/17/2021       COVID-19 Screening (If Applicable):   Lab Results   Component Value Date    COVID19 Not Detected 11/19/2021           Anesthesia Evaluation     history of anesthetic complications: PONV. Airway: Mallampati: I  TM distance: >3 FB   Neck ROM: full  Mouth opening: > = 3 FB Dental:          Pulmonary:       (-) shortness of breath                           Cardiovascular:        (-)  angina                Neuro/Psych:               GI/Hepatic/Renal:             Endo/Other:                     Abdominal:             Vascular:           Other Findings:             Anesthesia Plan      general     ASA 2                                 Adithya Lyn MD   11/23/2021

## 2021-11-23 NOTE — H&P
Interval H&P Note    Pt Name: Myra Randolph  MRN: [id number]  YOB: 1990  Date of evaluation: 11/23/2021      [x] I have reviewed in [facility] the hardcopy Surgery Progress Note by Dr Hailey Sánchez   for an Interval History and Physical note. [x] I have examined  Myra Randolph  There are no changes to the patient who is scheduled for a laparoscopic Robotic XI cholecystectomy by Dr Hailey Sánchez for dysfunctional gallbladder. The patient is postpartum with delivery 10/11/21 and is breast feeding. She continues to have loose pale stools with some nausea. She denies new health changes, fever, chills, wheezing, cough, increased SOB, chest pain, open sores or wounds. Last ibuprofen 11/20/21    PMH, Surgical History, Social History, Psych, and Family History reviewed and updated in EPIC in appropriate section. Vital signs: /78   Pulse 76   Temp 97.3 °F (36.3 °C) (Temporal)   Resp 18   Ht 5' 10\" (1.778 m)   Wt 227 lb 1.2 oz (103 kg)   SpO2 100%   BMI 32.58 kg/m²     Allergies:  Latex, Other, Nut [peanut-containing drug products], and Peanut (diagnostic)    Medications:    Prior to Admission medications    Medication Sig Start Date End Date Taking?  Authorizing Provider   omeprazole (PRILOSEC) 20 MG delayed release capsule Take 1 capsule by mouth 2 times daily  Patient not taking: Reported on 11/16/2021 11/10/21   Zan Dent MD   dicyclomine (BENTYL) 10 MG capsule Take 1 capsule by mouth 4 times daily  Patient not taking: Reported on 11/16/2021 11/10/21   Zan Dent MD   ibuprofen (ADVIL;MOTRIN) 600 MG tablet Take 1 tablet by mouth every 6 hours as needed for Pain 10/11/21   Angel Johnson DO   ondansetron (ZOFRAN-ODT) 4 MG disintegrating tablet Take 1 tablet by mouth 3 times daily as needed for Nausea or Vomiting 10/11/21   Angel Johnson DO   ferrous sulfate (IRON 325) 325 (65 Fe) MG tablet Take 1 tablet by mouth 2 times daily 9/8/21   Lakia Escalante, APRN - NP   levothyroxine (SYNTHROID) 25 MCG tablet Take 1 tablet by mouth daily 8/10/21 9/9/21  DEAN Elizabeth CNP   Blood Glucose Monitoring Suppl (TRUE METRIX METER) w/Device KIT use four times a day to [address] [place] [address] 7/6/21   Historical Provider, MD   levothyroxine (SYNTHROID) 100 MCG tablet take 1 tablet by mouth once daily 7/2/21   DEAN Elizabeth CNP   acetaminophen (APAP EXTRA STRENGTH) 500 MG tablet Take 2 tablets by mouth every 6 hours as needed for Pain 5/11/21   Seda Louise   Prenatal Vit-Fe Fumarate-FA (PRENATAL VITAMINS PLUS PO) Take by mouth    Historical Provider, MD   albuterol sulfate  (90 Base) MCG/ACT inhaler Inhale 2 puffs into the lungs every 6 hours as needed for Wheezing or Shortness of Breath 12/18/19   Yoana Wood MD         This is a 27 y.o. female who is pleasant, cooperative, alert and oriented x3, in no acute distress    Physical Exam:  Lungs: Bilateral equal air entry, unlabored, clear to ausculation, no wheezing, rales or rhonchi, normal effort  Cardiovascular: HR 76 normal rate, regular rhythm, no murmur, gallop, rub. Abdomen: Soft, nontender, nondistended, active  bowel sounds.     Labs:  Recent Labs     10/28/21  0023   HGB 12.6   HCT 40.0   WBC 6.7   MCV 86.8         K 4.0      CO2 25   BUN 16   CREATININE 1.03*   GLUCOSE 107*   AST 16   ALT 17   LABALBU 3.9       Recent Labs     11/19/21  1430   [address]       Not Detected       Declan Adame APRN - CNP   Electronically signed 11/23/2021 at 6:18 AM         Aubrey Aguila DO   Physician   Specialty:  General Surgery   Progress Notes      Signed   Encounter Date:  11/16/2021         Related encounter: Office Visit from 11/16/2021 in [facility]           Signed        [facility] General Surgery   History & Physical  Aubrey Aguila DO  Pt Name: Kassie Shelby  MRN: [id number]  YOB: 1990  Date of evaluation: 11/16/2021  Primary Care Physician: Yoana Wood MD Medication Sig Dispense Refill    ibuprofen (ADVIL;MOTRIN) 600 MG tablet Take 1 tablet by mouth every 6 hours as needed for Pain 30 tablet 0    ondansetron (ZOFRAN-ODT) 4 MG disintegrating tablet Take 1 tablet by mouth 3 times daily as needed for Nausea or Vomiting 21 tablet 0    ferrous sulfate (IRON 325) 325 (65 Fe) MG tablet Take 1 tablet by mouth 2 times daily 180 tablet 1    Blood Glucose Monitoring Suppl (TRUE METRIX METER) w/Device KIT use four times a day to CHECK BLOOD SUGAR        levothyroxine (SYNTHROID) 100 MCG tablet take 1 tablet by mouth once daily 30 tablet 2    acetaminophen (APAP EXTRA STRENGTH) 500 MG tablet Take 2 tablets by mouth every 6 hours as needed for Pain 60 tablet 2    Prenatal Vit-Fe Fumarate-FA (PRENATAL VITAMINS PLUS PO) Take by mouth        albuterol sulfate  (90 Base) MCG/ACT inhaler Inhale 2 puffs into the lungs every 6 hours as needed for Wheezing or Shortness of Breath 1 Inhaler 1    omeprazole (PRILOSEC) 20 MG delayed release capsule Take 1 capsule by mouth 2 times daily (Patient not taking: Reported on 11/16/2021) 60 capsule 1    dicyclomine (BENTYL) 10 MG capsule Take 1 capsule by mouth 4 times daily (Patient not taking: Reported on 11/16/2021) 90 capsule 0    levothyroxine (SYNTHROID) 25 MCG tablet Take 1 tablet by mouth daily 30 tablet 1      No current facility-administered medications for this visit. Facility-Administered Medications Ordered in Other Visits   Medication Dose Route Frequency Provider Last Rate Last Admin    sodium chloride flush 0.9 % injection 10 mL  10 mL IntraVENous PRN Nithin Sinha MD   10 mL at 11/15/21 1046         Home Medications:   Home Medications           Prior to Admission medications    Medication Sig Start Date End Date Taking?  Authorizing Provider   ibuprofen (ADVIL;MOTRIN) 600 MG tablet Take 1 tablet by mouth every 6 hours as needed for Pain 10/11/21   Yes Alesia Alejo,    ondansetron (ZOFRAN-ODT) 4 MG disintegrating tablet Take 1 tablet by mouth 3 times daily as needed for Nausea or Vomiting 10/11/21   Yes Renu Vasquez,    ferrous sulfate (IRON 325) 325 (65 Fe) MG tablet Take 1 tablet by mouth 2 times daily 9/8/21   Yes DEAN Denis NP   Blood Glucose Monitoring Suppl (TRUE METRIX METER) w/Device KIT use four times a day to [address] [place] [address] 7/6/21   Yes Historical Provider, MD   levothyroxine (SYNTHROID) 100 MCG tablet take 1 tablet by mouth once daily 7/2/21   Yes DEAN Garner CNP   acetaminophen (APAP EXTRA STRENGTH) 500 MG tablet Take 2 tablets by mouth every 6 hours as needed for Pain 5/11/21   Yes Seda Louise   Prenatal Vit-Fe Fumarate-FA (PRENATAL VITAMINS PLUS PO) Take by mouth     Yes Historical Provider, MD   albuterol sulfate  (90 Base) MCG/ACT inhaler Inhale 2 puffs into the lungs every 6 hours as needed for Wheezing or Shortness of Breath 12/18/19   Yes Susna Burnett MD   omeprazole (PRILOSEC) 20 MG delayed release capsule Take 1 capsule by mouth 2 times daily  Patient not taking: Reported on 11/16/2021 11/10/21     Susan Burnett MD   dicyclomine (BENTYL) 10 MG capsule Take 1 capsule by mouth 4 times daily  Patient not taking: Reported on 11/16/2021 11/10/21     Susan Burnett MD   levothyroxine (SYNTHROID) 25 MCG tablet Take 1 tablet by mouth daily 8/10/21 9/9/21   DEAN Garner CNP            Allergies  Latex, Other, Nut [peanut-containing drug products], and Peanut (diagnostic)        Review of Systems:  General: Denies any fever, chills. Eyes: Denies any changes in vision, diplopia or eye pain  Ears, Nose, Mouth: Denies changes in hearing/tinnitus or drainage from ears, no rhinorrhea or bloody nose, no difficulty chewing  Throat: no difficulty swallowing, no throat pain  Respiratory: Denies any shortness of breath or cough. Cardiac: Denies any chest pain, palpitations, claudication or edema.   Gastrointestinal: Right upper quadrant and epigastric pain Genitourinary: Denies any frequency, urgency, hesitancy or incontinence. Musculoskeletal: Denies worsening muscle weakness or recent trauma  Skin: Denies rashes or lesions  Psychiatric: Denies any recent changes in mood or affect  Hematologic: Denies bruising or bleeding easily. PHYSICAL EXAMINATION  Vitals:       Vitals:     11/16/21 0853   Pulse: 90   Resp: 12   SpO2: 100%         General Appearance:  awake, alert, no acute distress, well developed, well nourished   Skin:  Skin color, texture, turgor normal. No rashes or lesions. Head/face:  NCAT, face symmetrical  Eyes:  PERRL, no evidence of conjunctivitis or ptosis bilaterally  Ears:  External ears and canals grossly normal, no evidence of otorrhea. Nose/Sinuses:  Nares normal. Septum midline. Mucosa normal. No external drainage noted. Mouth/Neck:  Mucosa moist.  No external oral lesions. Trachea midline. No visible masses. Lungs:  Normal chest expansion, unlabored breathing without accessory muscle use. No audible rales, rhonchi, or wheezing. Cardiovascular: S1S2. No evidence of JVD. No evidence of pulsatile masses in abdomen  Abdomen:  Soft, tender right upper quadrant to deep palpation  Musculoskeletal: No evidence of bony/muscular deformities, trauma, atrophy of either left/right upper/lower extremity. No evidence of digital clubbing or cyanosis. Neurologic:  CN 2-12 grossly intact without obvious deficits. Grossly normal sensation in all extremities. Psychiatric: appropriate judgement and insight, appropriate recall of recent and remote memory, no evidence of depression/anxiety/agitation     RADIOLOGY:  The following images and/or reports were personally reviewed with the following significant findings pertinent to the Chief Complaint and/or HPI:     NM HEPATOBILIARY     Result Date: 11/15/2021  EXAMINATION: NUCLEAR MEDICINE HEPATOBILIARY SCINTIGRAPHY (HIDA SCAN) WITH EJECTION FRACTION.  TECHNIQUE: Approximately 5 millicuries OC00W Mebrofenin (Choletec) was administered IV. Then, dynamic images of the abdomen were obtained in the anterior projection for 60 mins. A right lateral view was also obtained at 60 mins. Due to a shortage/inavailability of CCK, one can (237 ml) Ensure plus was substitued orally. Images were obtained in the Yoruba projection and regions of interest were drawn around the gallbladder and ejection fraction was calculated. COMPARISON: No prior for comparison. HISTORY: ORDERING SYSTEM PROVIDED HISTORY: Right upper quadrant pain TECHNOLOGIST PROVIDED HISTORY: pain Is the patient pregnant?->No Reason for Exam: right upper quadrant pain Acuity: Unknown Type of Exam: Unknown FINDINGS: Upon injection of radiopharmaceutical, there is prompt visualization of the liver. Gallbladder is visualized by 20 minutes and progressively fills during the 1st hour of imaging. Activity is seen within small bowel after gallbladder stimulation. Gallbladder ejection fraction measured 15%. Normal value is >33% for Ensure protocol. Note, Ensure normal range is based on a limited study. No acute cholecystitis. Gallbladder ejection fraction is low, which can be due to functional gallbladder disorder or incomplete absorption of the fatty meal.      CT ABDOMEN PELVIS W IV CONTRAST Additional Contrast? None     Result Date: 10/28/2021  EXAMINATION: CT OF THE ABDOMEN AND PELVIS WITH CONTRAST 10/28/2021 2:17 am TECHNIQUE: CT of the abdomen and pelvis was performed with the administration of intravenous contrast. Multiplanar reformatted images are provided for review. Dose modulation, iterative reconstruction, and/or weight based adjustment of the mA/kV was utilized to reduce the radiation dose to as low as reasonably achievable.  COMPARISON: 11/09/2020 HISTORY: ORDERING SYSTEM PROVIDED HISTORY: abd pain TECHNOLOGIST PROVIDED HISTORY: [facility] pain Decision Support Exception - unselect if not a suspected or confirmed emergency medical condition->Emergency Medical Condition (MA) Reason for Exam: Abd pain, 2 weeks post delivery Acuity: Unknown Type of Exam: Unknown FINDINGS: Lower Chest:  Visualized portion of the lower chest demonstrates no acute abnormality. Organs:  Liver enhances normally without evidence of intrahepatic biliary ductal dilatation. The gallbladder is unremarkable. The spleen, pancreas and adrenal glands are unremarkable. The kidneys and visualized ureters are unremarkable. GI/Bowel: Stomach and duodenal sweep demonstrate no acute abnormality. There is no evidence of bowel obstruction. No evidence of abnormal bowel wall thickening or distension. The appendix is visualized and is unremarkable. No evidence of acute appendicitis. Pelvis: The bladder is unremarkable. Post partum uterus status post  noted. No unexpected finding. Peritoneum/Retroperitoneum: No evidence of ascites or free air. No evidence of lymphadenopathy. Aorta is normal in caliber. Bones/Soft Tissues: Surrounding osseous and soft tissue structures are unremarkable status post . Unremarkable postcontrast CT of the abdomen and pelvis 2 weeks status post . No concerning finding to explain the patient's symptoms. US Ed Fast Abdomen Limited     Result Date: 10/31/2021  POCUS_Biliary:     Exam Information:         Exam type:  Clinically indicated     Indication(s) for Exam:         The exam was performed with the following indications[de-identified]  Abdominal Pain     Views Obtained & Images Saved for These Views:          The following structures were examined and images saved for these views[de-identified]  Gallbladder - Long Axis, Gallbladder - Short Axis, Anterior Wall Diameter     Findings:         Gallstones:  Absent         Sludge:  Present         Sonographic Farrell's sign:  Absent         Pericholecystic fluid:  Absent         GB wall thickening?:  Absent         Maximal GB wall thickness in transverse (mm) =:  2.3     Interpretation:         Other:  Performed on 10/28/21 @ 00:10 by Dr. Trae Thakur. Interpreted by Dr. Carie Sy. Gallbladder overall distended, but normal GB wall thickness 0.23 cm. Confirmatory study:         What confirmatory study was done?:  CT         Confirmatory study findings[de-identified]  Normal CT scan Electronically signed by Madelyn Hector on Sunday, October 31, 2021 at 5:33 PM        DIAGNOSES:    Diagnosis Orders   1. Symptomatic cholelithiasis      2. Dysfunctional gallbladder            PLAN:  · We discussed nonoperative management versus surgical intervention, patient would like to proceed with cholecystectomy. Risks include bleeding, infection, scarring, bile leak, damage to surrounding tissues, chance of damage to the common bile duct, risk of subtotal cholecystectomy, conversion to open procedure, need for further surgery or procedures. Benefits, alternatives, complications and procedure details were explained to the patient, all questions were answered.   ·          Medical Decision Making: moderate complexity and risk     Electronically signed by Kristin Fish DO on 11/16/2021 at 10:55 AM

## 2021-11-23 NOTE — OP NOTE
Operative Note      Patient: Kassie Shelby  YOB: 1990  MRN: [id number]    Date of Procedure: 11/23/2021    Pre-Op Diagnosis: DX DYSFUNCTIONAL GALLBLADDER    Post-Op Diagnosis: same, chronic cholecystitis       Procedure(s):  CHOLECYSTECTOMY LAPAROSCOPIC ROBOTIC XI    Surgeon(s):  Aubrey Aguila DO    Assistant:   First Assistant: Jose M Xiao RN    Anesthesia: General    Estimated Blood Loss (mL): less than 5cc    Complications: None    Specimens:   ID Type Source Tests Collected by Time Destination   A : GALLBLADDER AND CONTENTS Tissue Gallbladder SURGICAL PATHOLOGY Aubrey Aguila DO 11/23/2021 0801        Implants:  * No implants in log *      Drains:   [REMOVED] Closed/Suction Drain Posterior; Distal Back Bulb 6 [place]ayan (Removed)       Findings: as above. Wound class 2    Detailed Description of Procedure:         HISTORY: The patient is a 27y.o. year old female with history of above preop diagnosis. The risk, benefits, expected outcome, and alternatives to the procedure were explained to the patient's understanding and written informed consent was obtained. DESCRIPTION OF PROCEDURE:  Patient was brought to the operating suite and placed on the operating table in supine position. Timeout was performed verifying correct patient, position, equipment and procedure to be performed. EPC cuffs were applied and preoperative antibiotics were infused. General anesthesia administered and the patient was endotracheally intubated. The patient's abdomen was prepped and draped in the usual sterile fashion. Scalpel was used to make a transverse incision 1cm at Ríos's point, 8mm Optiview trocar was used to gain entry into the abdomen under direct visualization, no injury to the underlying structures were seen. 1cm transverse incisions were then made below the umbilicus and two additional incisions in the RLQ at the midclavicular and anterior axillary lines.  8mm robot ports were placed under direct visualization at these sites. Pneumoperitoneum established with 15mmHg CO2. The patient was then positioned in reverse Trendelenburg with rotation the patient's left, then the robot was docked in the usual fashion. The gallbladder appeared chronically scarred consistent with chronic cholecystitis. Atraumatic graspers were used to grasp and elevate the fundus and infundibulum. Adhesions between the gallbladder and omentum were dissected away using blunt dissection as well as with electrocautery. The peritoneum between the gallbladder and liver were taken down with electrocautery on the medial and lateral aspects. The cystic duct and artery were skeletonized until the critical view was achieved. The cystic duct was clipped once proximally and once distally with Hemalok clips. The cystic artery was clipped once proximally with Hemalok clips. The duct and artery were then transected with scissors. The gallbladder was dissected from the gallbladder fossa with electrocautery, then placed into an laparoscopic specimen bag which was removed from the abdomen through the LUQ incision. On inspection of the area of dissection, hemostasis was excellent, no leakage of bile was noted, clips were intact. The working ports were removed under direct visualization, the camera and camera port were then removed and the abdomen dessuflated. The port sites were locally anesthetized with a total of 40cc of 1% lidocaine and 0.25% marcaine without epinephrine. The port sites were then closed with subcuticular sutures of 4-0 monocryl then covered with skin glue. The patient tolerated the procedure well without complications, all sponge needle and instrument counts were correct at the end of the case. The patient was successfully extubated and taken to PACU in stable condition.       Electronically signed by Bryce Mcguire DO on 11/23/2021 at 8:32 AM

## 2021-11-24 LAB — SURGICAL PATHOLOGY REPORT: NORMAL

## 2021-12-13 ENCOUNTER — OFFICE VISIT (OUTPATIENT)
Dept: SURGERY | Age: 31
End: 2021-12-13

## 2021-12-13 VITALS
HEIGHT: 70 IN | RESPIRATION RATE: 12 BRPM | HEART RATE: 99 BPM | OXYGEN SATURATION: 99 % | WEIGHT: 227 LBS | BODY MASS INDEX: 32.5 KG/M2

## 2021-12-13 DIAGNOSIS — Z90.49 STATUS POST LAPAROSCOPIC CHOLECYSTECTOMY: Primary | ICD-10-CM

## 2021-12-13 PROCEDURE — 99024 POSTOP FOLLOW-UP VISIT: CPT | Performed by: SURGERY

## 2021-12-13 NOTE — PROGRESS NOTES
[facility]  Progress Note    PATIENT NAME: Cristiana Randall     CLINIC VISIT DATE: 12/13/2021    SUBJECTIVE:  Cristiana Randall is a 27 y.o. female who presents to the clinic today for follow up to robot anna performed 11/23. No new issues since surgery, pt is tolerating regular diet and having normal BM. Pathology as follows:    -- Diagnosis --   GALLBLADDER:  CHOLELITHIASIS, MILD CHRONIC CHOLECYSTITIS WITH   CHOLESTEROLOSIS, AND CYSTIC DUCT LYMPH NODE WITH MILD REACTIVE   LYMPHOID HYPERPLASIA. OBJECTIVE:    Pulse 99   Resp 12   Ht 5' 10\" (1.778 m)   Wt 227 lb (103 kg)   SpO2 99%   BMI 32.57 kg/m²     General Appearance:  awake, alert, no acute distress, well developed, well nourished   Skin:  Skin color, texture, turgor normal. No rashes or lesions. Lungs:  Normal chest expansion, unlabored breathing without accessory muscle use. No audible rales, rhonchi, or wheezing. Cardiovascular: S1S2. No evidence of JVD. No evidence of pulsatile masses in abdomen  Abdomen:  Soft, non-tender, no organomegaly, no masses. Incisions C/D/I without evidence of bleeding/infection  Musculoskeletal: No evidence of bony/muscular deformities, trauma, atrophy of either left/right upper/lower extremity. No evidence of digital clubbing or cyanosis. ASSESSMENT:  1. Status post laparoscopic cholecystectomy          PLAN:  1. Lifting restriction to less than 20lbs for the next 4 weeks, unrestricted after this.   2. F/U prn    Electronically signed by Josselin Colón DO on 12/13/2021 at 1:24 PM

## 2022-02-07 ENCOUNTER — OFFICE VISIT (OUTPATIENT)
Dept: FAMILY MEDICINE CLINIC | Age: 32
End: 2022-02-07
Payer: MEDICARE

## 2022-02-07 VITALS
BODY MASS INDEX: 31.78 KG/M2 | TEMPERATURE: 97.5 F | DIASTOLIC BLOOD PRESSURE: 82 MMHG | WEIGHT: 222 LBS | HEART RATE: 89 BPM | OXYGEN SATURATION: 100 % | SYSTOLIC BLOOD PRESSURE: 118 MMHG | HEIGHT: 70 IN
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
## 2022-02-07 DIAGNOSIS — E03.9 HYPOTHYROIDISM, UNSPECIFIED TYPE: ICD-10-CM

## 2022-02-07 DIAGNOSIS — M79.671 RIGHT FOOT PAIN: ICD-10-CM

## 2022-02-07 DIAGNOSIS — M25.522 LEFT ELBOW PAIN: ICD-10-CM

## 2022-02-07 DIAGNOSIS — M25.512 ACUTE PAIN OF LEFT SHOULDER: ICD-10-CM

## 2022-02-07 PROCEDURE — 1036F TOBACCO NON-USER: CPT | Performed by: FAMILY MEDICINE

## 2022-02-07 PROCEDURE — 99213 OFFICE O/P EST LOW 20 MIN: CPT | Performed by: FAMILY MEDICINE

## 2022-02-07 PROCEDURE — G8427 DOCREV CUR MEDS BY ELIG CLIN: HCPCS | Performed by: FAMILY MEDICINE

## 2022-02-07 PROCEDURE — G8484 FLU IMMUNIZE NO ADMIN: HCPCS | Performed by: FAMILY MEDICINE

## 2022-02-07 PROCEDURE — G8417 CALC BMI ABV UP PARAM F/U: HCPCS | Performed by: FAMILY MEDICINE

## 2022-02-07 RX ORDER — LEVOTHYROXINE SODIUM 0.1 MG/1
TABLET ORAL
Qty: 30 TABLET | Refills: 2 | Status: SHIPPED | OUTPATIENT
Start: 2022-02-07

## 2022-02-07 RX ORDER — IBUPROFEN 800 MG/1
800 TABLET ORAL EVERY 8 HOURS PRN
Qty: 90 TABLET | Refills: 0 | Status: SHIPPED | OUTPATIENT
Start: 2022-02-07

## 2022-02-07 RX ORDER — LIDOCAINE 40 MG/G
CREAM TOPICAL
Qty: 45 G | Refills: 3 | Status: SHIPPED | OUTPATIENT
Start: 2022-02-07

## 2022-02-07 ASSESSMENT — ENCOUNTER SYMPTOMS
BACK PAIN: 1
ANAL BLEEDING: 0
PHOTOPHOBIA: 0
COUGH: 0
WHEEZING: 0
ABDOMINAL DISTENTION: 0
BLOOD IN STOOL: 0
SHORTNESS OF BREATH: 0
COLOR CHANGE: 0
ABDOMINAL PAIN: 0

## 2022-02-07 ASSESSMENT — PATIENT HEALTH QUESTIONNAIRE - PHQ9
SUM OF ALL RESPONSES TO PHQ QUESTIONS 1-9: 0
SUM OF ALL RESPONSES TO PHQ9 QUESTIONS 1 & 2: 0
SUM OF ALL RESPONSES TO PHQ QUESTIONS 1-9: 0
SUM OF ALL RESPONSES TO PHQ QUESTIONS 1-9: 0
1. LITTLE INTEREST OR PLEASURE IN DOING THINGS: 0
2. FEELING DOWN, DEPRESSED OR HOPELESS: 0
SUM OF ALL RESPONSES TO PHQ QUESTIONS 1-9: 0
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
## 2022-02-07 NOTE — PROGRESS NOTES
Visit Information    Have you changed or started any medications since your last visit including any over-the-counter medicines, vitamins, or herbal medicines? no   Are you having any side effects from any of your medications? -  no  Have you stopped taking any of your medications? Is so, why? -  yes - BLOOD GLUCOSE ONLY DURING PREGNANCY     Have you seen any other physician or provider since your last visit? No  Have you had any other diagnostic tests since your last visit? No  Have you been seen in the emergency room and/or had an admission to a hospital since we last saw you? No  Have you had your routine dental cleaning in the past 6 months? no    Have you activated your [employer] account? If not, what are your barriers?  Yes     Patient Care Team:  Raeann Montano MD as PCP - General (Family Medicine)  Raeann Montano MD as PCP - [facility] Provider  Lukasz Becerra MD as Obstetrician (Perinatology)    Medical History Review  Past Medical, Family, and Social History reviewed and does contribute to the patient presenting condition    Health Maintenance   Topic Date Due    Varicella vaccine (1 of 2 - 2-dose childhood series) Never done    COVID-19 Vaccine (1) Never done    Pneumococcal 0-64 years Vaccine (1 of 3 - PCV13) Never done    Hepatitis B vaccine (1 of 3 - Risk 3-dose series) Never done    Flu vaccine (1) Never done    A1C test (Diabetic or Prediabetic)  07/02/2022    TSH testing  08/10/2022    Depression Screen  11/09/2022    Cervical cancer screen  05/03/2026    DTaP/Tdap/Td vaccine (2 - Td or Tdap) 12/27/2026    Hepatitis C screen  Completed    HIV screen  Completed    Hepatitis A vaccine  Aged Out    Hib vaccine  Aged Out    Meningococcal (ACWY) vaccine  Aged Out

## 2022-02-17 ENCOUNTER — HOSPITAL ENCOUNTER (OUTPATIENT)
Age: 32
Discharge: HOME | End: 2022-02-19
Payer: MEDICARE

## 2022-02-17 ENCOUNTER — HOSPITAL ENCOUNTER (OUTPATIENT)
Dept: GENERAL RADIOLOGY | Age: 32
Discharge: HOME | End: 2022-02-19
Payer: MEDICARE
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
## 2022-02-17 DIAGNOSIS — M25.522 LEFT ELBOW PAIN: ICD-10-CM

## 2022-02-17 DIAGNOSIS — M79.671 RIGHT FOOT PAIN: ICD-10-CM

## 2022-02-17 PROCEDURE — 73630 X-RAY EXAM OF FOOT: CPT

## 2022-04-19 ENCOUNTER — HOSPITAL ENCOUNTER (OUTPATIENT)
Dept: MRI IMAGING | Facility: CLINIC | Age: 32
Discharge: HOME | End: 2022-04-21
Payer: MEDICARE

## 2022-04-19 DIAGNOSIS — G93.5 CHIARI I MALFORMATION (HCC): ICD-10-CM

## 2022-04-19 DIAGNOSIS — R51.9 NONINTRACTABLE HEADACHE, UNSPECIFIED CHRONICITY PATTERN, UNSPECIFIED HEADACHE TYPE: ICD-10-CM

## 2022-04-19 PROCEDURE — 6360000004 HC RX CONTRAST MEDICATION: Performed by: PSYCHIATRY & NEUROLOGY

## 2022-04-19 PROCEDURE — A9579 GAD-BASE MR CONTRAST NOS,1ML: HCPCS | Performed by: PSYCHIATRY & NEUROLOGY

## 2022-04-19 PROCEDURE — 70553 MRI BRAIN STEM W/O & W/DYE: CPT

## 2022-04-19 PROCEDURE — 2580000003 HC RX 258: Performed by: PSYCHIATRY & NEUROLOGY

## 2022-04-19 RX ORDER — SODIUM CHLORIDE 0.9 % (FLUSH) 0.9 %
10 SYRINGE (ML) INJECTION PRN
Status: COMPLETED | OUTPATIENT
Start: 2022-04-19 | End: 2022-04-19

## 2022-04-19 RX ADMIN — Medication 10 ML: at 12:54

## 2022-04-19 RX ADMIN — GADOTERIDOL 20 ML: 279.3 INJECTION, SOLUTION INTRAVENOUS at 12:54

## 2022-10-01 ENCOUNTER — OFFICE VISIT (OUTPATIENT)
Dept: FAMILY MEDICINE CLINIC | Age: 32
End: 2022-10-01
Payer: MEDICARE

## 2022-10-01 ENCOUNTER — HOSPITAL ENCOUNTER (OUTPATIENT)
Age: 32
Setting detail: SPECIMEN
Discharge: HOME | End: 2022-10-01

## 2022-10-01 VITALS
HEART RATE: 102 BPM | SYSTOLIC BLOOD PRESSURE: 122 MMHG | OXYGEN SATURATION: 99 % | TEMPERATURE: 97.7 F | DIASTOLIC BLOOD PRESSURE: 78 MMHG

## 2022-10-01 DIAGNOSIS — J01.00 ACUTE NON-RECURRENT MAXILLARY SINUSITIS: ICD-10-CM

## 2022-10-01 DIAGNOSIS — J01.00 ACUTE NON-RECURRENT MAXILLARY SINUSITIS: Primary | ICD-10-CM

## 2022-10-01 PROCEDURE — 99213 OFFICE O/P EST LOW 20 MIN: CPT | Performed by: FAMILY MEDICINE

## 2022-10-01 PROCEDURE — 1036F TOBACCO NON-USER: CPT | Performed by: FAMILY MEDICINE

## 2022-10-01 PROCEDURE — G8417 CALC BMI ABV UP PARAM F/U: HCPCS | Performed by: FAMILY MEDICINE

## 2022-10-01 PROCEDURE — G8484 FLU IMMUNIZE NO ADMIN: HCPCS | Performed by: FAMILY MEDICINE

## 2022-10-01 PROCEDURE — G8427 DOCREV CUR MEDS BY ELIG CLIN: HCPCS | Performed by: FAMILY MEDICINE

## 2022-10-01 RX ORDER — GUAIFENESIN AND DEXTROMETHORPHAN HYDROBROMIDE 1200; 60 MG/1; MG/1
1 TABLET, EXTENDED RELEASE ORAL 2 TIMES DAILY PRN
Qty: 28 TABLET | Refills: 0 | Status: SHIPPED | OUTPATIENT
Start: 2022-10-01

## 2022-10-01 RX ORDER — FLUTICASONE PROPIONATE 50 MCG
2 SPRAY, SUSPENSION (ML) NASAL DAILY
Qty: 16 G | Refills: 0 | Status: SHIPPED | OUTPATIENT
Start: 2022-10-01 | End: 2022-10-31

## 2022-10-01 RX ORDER — AMOXICILLIN AND CLAVULANATE POTASSIUM 875; 125 MG/1; MG/1
1 TABLET, FILM COATED ORAL 2 TIMES DAILY
Qty: 20 TABLET | Refills: 0 | Status: SHIPPED | OUTPATIENT
Start: 2022-10-01 | End: 2022-10-11

## 2022-10-01 ASSESSMENT — ENCOUNTER SYMPTOMS
NAUSEA: 0
SINUS PAIN: 1
COUGH: 1
SINUS PRESSURE: 1
DIARRHEA: 0
ABDOMINAL PAIN: 0
VOMITING: 0
SORE THROAT: 1

## 2022-10-01 NOTE — PROGRESS NOTES
MD Nancy Quinnmogar[facility]  [address] [facility] [address]  Dept: [address] is a 34 y.o. female who presents today for hermedical conditions/complaints as noted below.   Luz Maria Paulino is here today c/o Cough (Onset 4 days ago ), Congestion, and Fever       HPI:     HPI    Patient presents to the walk-in clinic for evaluation of fever that began 4 days ago  T-max 104 degrees yesterday (infrared ear thermometer), cough is persistent and occasionally productive, has lots of congestion sinus pressure postnasal drip, endorses bilateral ear pain and feeling of fullness in the ears, myalgias, fatigue, sore throat that is slightly improved today, voice hoarseness  Able to keep up with oral fluid intake  Denies nausea, vomiting, diarrhea, wheezing  Feels her breathing is okay  Using Aleve, Tylenol  Son tested positive for rhinovirus    Patient Active Problem List   Diagnosis    Reactive airway disease    Allergic sinusitis    Psoriasis    Neck pain    Hypothyroidism    Chronic fatigue    Seasonal allergic rhinitis due to pollen    Anti-RNP antibodies present    Positive TEO (antinuclear antibody)    Lumbar disc herniation    Lumbar radiculopathy    Prediabetes    Lumbar spondylosis    Spondylolisthesis at L4-L5 level    Right ovarian cyst    Class 2 obesity due to excess calories without serious comorbidity with body mass index (BMI) of 35.0 to 35.9 in adult    Postauricular adenopathy    Sacroiliitis, not elsewhere classified (HCC)    Dizziness    POTS (postural orthostatic tachycardia syndrome)    CSF leak    Cyst of spinal meninges    Double vision    Hemicrania continua    H/O:       (vaginal birth after ) X 2 ( and )    Family history of congenital heart defect    Family history of Down syndrome    Family history of diabetes mellitus in father    History of Chiari malformation    History of back surgery    Tarlov cysts    Pregestational Diabetes    cHTN (no meds)    RLTCS w/ RRS (gen) 10/11/21 M Apg  Wt 7#1    Patient-requested procedure    BMI 35.0-35.9,adult       Past Medical History:   Diagnosis Date    Allergic rhinitis     Class 2 obesity due to excess calories without serious comorbidity with body mass index (BMI) of 35.0 to 35.9 in adult 9815    Complication of anesthesia     epidural stopped working    History of Chiari malformation     Hypothyroidism     Migraines     Neuropathy     Osteoarthritis     POTS (postural orthostatic tachycardia syndrome)     Psoriasis     RAD (reactive airway disease)     triggered by bleach only    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     Tarlov cysts     treated surgically in [place]    Thyroid disease     Vasovagal syncope     Wears glasses      Past Surgical History:   Procedure Laterality Date    BACK SURGERY  2020    reconstructive, 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     SECTION  2010     SECTION N/A 10/11/2021     SECTION performed by Mohit Resendiz DO at [facility] OR    CHOLECYSTECTOMY, [address], LAPAROSCOPIC N/A 2021    CHOLECYSTECTOMY LAPAROSCOPIC ROBOTIC XI performed by Romario Corona DO at [facility] 598 Left 2019    EPIDURAL INJECTION s2 performed by Paulino Lopez MD at [facility] 144 Bilateral 2018    bilat facet block no steroid    NERVE BLOCK  2019    S2 bupivacaine injection    SALPINGECTOMY Bilateral 10/11/2021    SALPINGECTOMY performed by Mohit Resendiz DO at [facility] OR    SPINE SURGERY      sacral cysts and then several surgeries to correct spinal fluid leaks    US DRAIN SOFT TISSUE ABSCESS  10/26/2020    US DRAIN SOFT TISSUE ABSCESS 10/26/2020 [facility]TRAS[facility]     Family History   Problem Relation Age of Onset    Diabetes Paternal Grandmother     Heart Failure Maternal Grandmother     Heart Attack Maternal Grandfather     Diabetes Father      Social History     Tobacco Use    Smoking status: Never    Smokeless tobacco: Never   Vaping Use    Vaping Use: Never used   Substance Use Topics    Alcohol use: No    Drug use: No       Current Outpatient Medications:     fluticasone (FLONASE) 50 MCG/ACT nasal spray, 2 sprays by Nasal route daily, Disp: 16 g, Rfl: 0    Dextromethorphan-guaiFENesin (MUCINEX DM MAXIMUM STRENGTH)  MG TB12, Take 1 tablet by mouth 2 times daily as needed (cough), Disp: 28 tablet, Rfl: 0    amoxicillin-clavulanate (AUGMENTIN) 875-125 MG per tablet, Take 1 tablet by mouth 2 times daily for 10 days, Disp: 20 tablet, Rfl: 0    ibuprofen (ADVIL;MOTRIN) 800 MG tablet, Take 1 tablet by mouth every 8 hours as needed for Pain Short term. Take with food. , Disp: 90 tablet, Rfl: 0    albuterol sulfate  (90 Base) MCG/ACT inhaler, Inhale 2 puffs into the lungs every 6 hours as needed for Wheezing or Shortness of Breath, Disp: 1 Inhaler, Rfl: 1    levothyroxine (SYNTHROID) 100 MCG tablet, take 1 tablet by mouth once daily (Patient not taking: Reported on 10/1/2022), Disp: 30 tablet, Rfl: 2    lidocaine (LMX) 4 % cream, Apply topically every 8 hrs as needed for pain (Patient not taking: Reported on 10/1/2022), Disp: 45 g, Rfl: 3    ibuprofen (ADVIL;MOTRIN) 600 MG tablet, Take 1 tablet by mouth every 6 hours as needed for Pain (Patient not taking: No sig reported), Disp: 30 tablet, Rfl: 0    ondansetron (ZOFRAN-ODT) 4 MG disintegrating tablet, Take 1 tablet by mouth 3 times daily as needed for Nausea or Vomiting (Patient not taking: Reported on 10/1/2022), Disp: 21 tablet, Rfl: 0    levothyroxine (SYNTHROID) 25 MCG tablet, Take 1 tablet by mouth daily, Disp: 30 tablet, Rfl: 1    Subjective:     Review of Systems   Constitutional:  Positive for fatigue and fever. HENT:  Positive for congestion, ear pain, sinus pressure, sinus pain and sore throat. Negative for ear discharge. Respiratory:  Positive for cough. Gastrointestinal:  Negative for abdominal pain, diarrhea, nausea and vomiting. Musculoskeletal:  Positive for myalgias. Objective:     /78   Pulse (!) 102   Temp 97.7 °F (36.5 °C)   SpO2 99%     Physical Exam  Constitutional:       Appearance: Normal appearance. She is well-developed. She is not ill-appearing, toxic-appearing or diaphoretic. HENT:      Head: Normocephalic. Right Ear: Tympanic membrane normal.      Left Ear: Tympanic membrane normal.      Nose:      Right Sinus: Maxillary sinus tenderness present. Left Sinus: Maxillary sinus tenderness present. Mouth/Throat:      Mouth: Mucous membranes are moist.      Pharynx: No oropharyngeal exudate or posterior oropharyngeal erythema. Eyes:      General:         Right eye: No discharge. Left eye: No discharge. Conjunctiva/sclera: Conjunctivae normal.   Cardiovascular:      Rate and Rhythm: Normal rate and regular rhythm. Heart sounds: Normal heart sounds. No murmur heard. Pulmonary:      Effort: Pulmonary effort is normal. No respiratory distress. Breath sounds: Normal breath sounds. No wheezing. Lymphadenopathy:      Cervical: No cervical adenopathy. Neurological:      Mental Status: She is alert. Psychiatric:         Behavior: Behavior normal.         Thought Content: Thought content normal.         Judgment: Judgment normal.       Assessment & Plan:      1. Acute non-recurrent maxillary sinusitis  Discussed that symptoms are likely viral.  COVID testing ordered. Recommended rest and hydration. Recommended Flonase, NeilMed sinus rinse, Mucinex prn. She has albuterol at home that she will use for the coughing fits. We will do the watchful waiting approach, if her sinusitis symptoms persist beyond 10 days or if she has initial improvement in the sinusitis followed by worsening she can fill Rx for Augmentin that was printed.   F/U if sx don't improve or worsen. - COVID-19; Future  - fluticasone (FLONASE) 50 MCG/ACT nasal spray; 2 sprays by Nasal route daily  Dispense: 16 g; Refill: 0  - Dextromethorphan-guaiFENesin (MUCINEX DM MAXIMUM STRENGTH)  MG TB12; Take 1 tablet by mouth 2 times daily as needed (cough)  Dispense: 28 tablet; Refill: 0  - amoxicillin-clavulanate (AUGMENTIN) 875-125 MG per tablet; Take 1 tablet by mouth 2 times daily for 10 days  Dispense: 20 tablet; Refill: 0        Call or return to clinic prn if these symptoms worsen or fail to improve as anticipated. I have reviewed the instructions with the patient, answering all questions to their satisfaction.     Electronically signed by Gale Kent MD on 10/1/2022 at 11:25 AM

## 2022-10-02 LAB
SARS-COV-2: NORMAL
SARS-COV-2: NOT DETECTED
SOURCE: NORMAL

## 2022-10-03 ENCOUNTER — OFFICE VISIT (OUTPATIENT)
Dept: FAMILY MEDICINE CLINIC | Age: 32
End: 2022-10-03
Payer: MEDICARE

## 2022-10-03 VITALS
DIASTOLIC BLOOD PRESSURE: 74 MMHG | OXYGEN SATURATION: 100 % | SYSTOLIC BLOOD PRESSURE: 124 MMHG | HEART RATE: 74 BPM | TEMPERATURE: 97.4 F

## 2022-10-03 DIAGNOSIS — H10.32 ACUTE CONJUNCTIVITIS OF LEFT EYE, UNSPECIFIED ACUTE CONJUNCTIVITIS TYPE: Primary | ICD-10-CM

## 2022-10-03 PROCEDURE — G8484 FLU IMMUNIZE NO ADMIN: HCPCS | Performed by: NURSE PRACTITIONER

## 2022-10-03 PROCEDURE — 1036F TOBACCO NON-USER: CPT | Performed by: NURSE PRACTITIONER

## 2022-10-03 PROCEDURE — G8427 DOCREV CUR MEDS BY ELIG CLIN: HCPCS | Performed by: NURSE PRACTITIONER

## 2022-10-03 PROCEDURE — 99213 OFFICE O/P EST LOW 20 MIN: CPT | Performed by: NURSE PRACTITIONER

## 2022-10-03 PROCEDURE — G8417 CALC BMI ABV UP PARAM F/U: HCPCS | Performed by: NURSE PRACTITIONER

## 2022-10-03 RX ORDER — POLYMYXIN B SULFATE AND TRIMETHOPRIM 1; 10000 MG/ML; [USP'U]/ML
1 SOLUTION OPHTHALMIC EVERY 6 HOURS
Qty: 1 EACH | Refills: 0 | Status: SHIPPED | OUTPATIENT
Start: 2022-10-03 | End: 2022-10-10

## 2022-10-03 ASSESSMENT — ENCOUNTER SYMPTOMS
EYE REDNESS: 1
EYE ITCHING: 0
EYE PAIN: 0
EYE DISCHARGE: 1
DOUBLE VISION: 0
PHOTOPHOBIA: 0

## 2022-10-03 ASSESSMENT — VISUAL ACUITY: OU: 1

## 2022-10-03 NOTE — PROGRESS NOTES
[address] [address]  Dept: [phone]  Dept Fax: [phone]    Brayan Singletary is a 34 y.o. female who presents to the urgent care today for her medical conditions/complaints as notedbelow. Brayan Singletary is c/o of Conjunctivitis (Onset since last night both eyes)      HPI:     34 yr old female presents for pink eye. Left eye . Her infant son was dx with pink eye. She gave him his medication and did not wash her hands. Now has same sx. Conjunctivitis   The current episode started today. The onset was gradual. The problem occurs continuously. The problem is mild. Nothing relieves the symptoms. Nothing aggravates the symptoms. Associated symptoms include URI (seen for uri sx cpl days ago), eye discharge and eye redness. Pertinent negatives include no fever, no decreased vision, no double vision, no eye itching, no photophobia, no congestion, no ear discharge, no ear pain, no headaches, no hearing loss, no mouth sores and no eye pain. Severity: denies pain. The left eye is affected. The eye pain is not associated with movement. The eyelid exhibits no abnormality. She has been Behaving normally. She has been Eating and drinking normally. There were sick contacts at home.      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 Do all this for 7 days. 1 each 0    fluticasone (FLONASE) 50 MCG/ACT nasal spray 2 sprays by Nasal route daily 16 g 0    Dextromethorphan-guaiFENesin (MUCINEX DM MAXIMUM STRENGTH)  MG TB12 Take 1 tablet by mouth 2 times daily as needed (cough) 28 tablet 0    amoxicillin-clavulanate (AUGMENTIN) 875-125 MG per tablet Take 1 tablet by mouth 2 times daily for 10 days 20 tablet 0    levothyroxine (SYNTHROID) 100 MCG tablet take 1 tablet by mouth once daily 30 tablet 2    lidocaine (LMX) 4 % cream Apply topically every 8 hrs as needed for pain 45 g 3    ibuprofen (ADVIL;MOTRIN) 800 MG tablet Take 1 tablet by mouth every 8 hours as needed for Pain Short term. Take with food. 90 tablet 0    ondansetron (ZOFRAN-ODT) 4 MG disintegrating tablet Take 1 tablet by mouth 3 times daily as needed for Nausea or Vomiting 21 tablet 0    albuterol sulfate  (90 Base) MCG/ACT inhaler Inhale 2 puffs into the lungs every 6 hours as needed for Wheezing or Shortness of Breath 1 Inhaler 1    ibuprofen (ADVIL;MOTRIN) 600 MG tablet Take 1 tablet by mouth every 6 hours as needed for Pain (Patient not taking: No sig reported) 30 tablet 0    levothyroxine (SYNTHROID) 25 MCG tablet Take 1 tablet by mouth daily 30 tablet 1     No current facility-administered medications for this visit. Allergies   Allergen Reactions    Latex Hives and Swelling    Other Shortness Of Breath     Bleach causes reactive airway disease    Nut [Peanut-Containing Drug Products] Nausea And Vomiting    Peanut (Diagnostic)      Other reaction(s): Nausea And Vomiting       Subjective:      Review of Systems   Constitutional:  Negative for fever.    HENT:  Negative for congestion, ear discharge, ear pain, hearing loss and mouth sores. Eyes:  Positive for discharge and redness. Negative for double vision, photophobia, pain and itching. Neurological:  Negative for headaches. All other systems reviewed and are negative. 14 systems reviewed and negative except as listed in HPI. Objective:     Physical Exam  Vitals and nursing note reviewed. Constitutional:       General: She is not in acute distress. Appearance: Normal appearance. She is not ill-appearing, toxic-appearing or diaphoretic. HENT:      Head: Normocephalic. Right Ear: External ear normal.      Left Ear: External ear normal.   Eyes:      General: Lids are normal. Vision grossly intact. Gaze aligned appropriately. No allergic shiner, visual field deficit or scleral icterus. Right eye: No discharge or hordeolum. Left eye: No discharge or hordeolum. Extraocular Movements:      Right eye: Normal extraocular motion. Left eye: Normal extraocular motion. Conjunctiva/sclera:      Right eye: Right conjunctiva is not injected. No chemosis, exudate or hemorrhage. Left eye: Left conjunctiva is injected. No chemosis, exudate or hemorrhage. Pupils: Pupils are equal, round, and reactive to light. Cardiovascular:      Rate and Rhythm: Normal rate. Pulses: Normal pulses. Pulmonary:      Effort: Pulmonary effort is normal.   Musculoskeletal:      Cervical back: Neck supple. Comments: Gait steady   Skin:     General: Skin is warm and dry. Neurological:      General: No focal deficit present. Mental Status: She is alert. Psychiatric:         Mood and Affect: Mood normal.     /74 (Site: Left Upper Arm, Position: Sitting, Cuff Size: Large Adult)   Pulse 74   Temp 97.4 °F (36.3 °C) (Infrared)   SpO2 100%     Assessment:       Diagnosis Orders   1.  Acute conjunctivitis of left eye, unspecified acute conjunctivitis type            Plan:    Known exposure to pink eye from her son  Good handwashing  Reviewed natural progression  Polytrim eye drops rx    Return for make appt with Family Doc in 3-4 days for recheck. Orders Placed This Encounter   Medications    trimethoprim-polymyxin b (POLYTRIM) 49403-2.1 UNIT/ML-% ophthalmic solution     Sig: Place 1 drop into both eyes in the morning and 1 drop at noon and 1 drop in the evening and 1 drop before bedtime. Do all this for 7 days. Dispense:  1 each     Refill:  0         Patient given educational materials - see patient instructions. Discussed use, benefit, and side effects of prescribed medications. All patient questions answered. Pt voicedunderstanding.     Electronically signed by Dennie Cordoba, APRN - CNP on 10/3/2022 at 9:00 AM

## 2022-10-05 NOTE — PROGRESS NOTES
[facility]  Offices: [address], [place], [address] [facility]  [address] [facility], [address], [place], [address] [place] [address]  [address] [facility] [address], [address]  Phone: [phone]  Fax: [phone]    MD Esau Weir, MD David He, MD Lb Myles, MD Karmen Rico, CNP    1/7/2020      HISTORY OF PRESENT ILLNESS:       I had the pleasure of seeing Tj Ybarra, who returns for continuing neurologic care for chronic daily headaches (onset around 2013).    She also has a history of Chiari type I, normal CSF flow study recently. She had her first round of Botox on 11/20/2019 and tolerated it well with no side effects. Today, patient reports her headaches are better. She used to have a daily, dull headache and a severe migraine about twice a week. Currently, she reports that her daily headaches are much less severe, approximately 3/10. Her migraines have also decreased in frequency to less than once a week. She is now on Imitrex 50 mg for rescue which does give her partial relief, denies any side effects with it. Her headaches are located in the bilateral occipital areas, described as a dull and achy pain with no associated nausea, photophobia or phonophobia. Headache severity ranges from 5-10 over 10, lasting for at least 6 to 8 hours. Stress aggravates her headaches, she denies any alleviating factors. Singing, yawning, laughing occasionally triggers her headaches. Prior medication trials: Amitriptyline 25 mg (worked, sedation), Topamax (cognitive slowing), verapamil  mg (no relief), Emgality (partial relief)     Prior testing reviewed:  Darryle Pina without contrast 11/21/18: Unremarkable  MRI CSF flow 6/5/19:   Chiari type 1 malformation.       Normal appearing CSF flow.    Noncontrast head CT April 2017: Unremarkable  PSG 6/2018: normal          PAST MEDICAL HISTORY:         Diagnosis Date    visual fields intact to confrontation , fundoscopy showed no  papiledema                                                III, IV, VI - extra-ocular muscles full: no pupillary defect; no JITENDRA, no nystagmus, no ptosis   V - normal facial sensation                                                               VII - normal facial symmetry                                                             VIII - intact hearing                                                                             IX, X - symmetrical palate                                                                  XI - symmetrical shoulder shrug                                                       XII - tongue midline without atrophy or fasciculation      Motor function  Normal muscle bulk and tone; strength 5/5 on b/l UE, 4-/5 on LLE and 4+/5 on RLE (limited by back pain), no pronator drift      Sensory function Intact to light touch, pinprick, vibration, proprioception on all 4 extremities      Cerebellar Intact fine motor movement. No involuntary movements or tremors. No ataxia or dysmetria on finger to nose or heel to shin testing      Reflex function DTR 2+ on bilateral UE and LE, symmetric. Negative Babinski      Gait                   normal base, uses a wheeled walker              ASSESSMENT AND PLAN:       This is a 26 y.o. female who comes in for follow up for a mixed headache: Chronic migraine without aura with chronic daily headaches. She has a history of Chiari I malformation with normal CSF flow. Her headaches are now much improved with Botox, taking Imitrex 50 mg for rescue. We will continue current management. Will increase Imitrex to 100 mg daily for better relief. Follow-up for her next Botox injection. De Garrett MD  Neurology and Sleep Medicine  [address]  [address].    Please note that this chart was generated using voice recognition Dragon dictation software.   Although every effort was made to ensure the accuracy of this automated transcription, some errors in transcription may have occurred. Stage 2: Additional Anesthesia Volume In Cc: 3.0

## 2023-04-27 NOTE — FLOWSHEET NOTE
I have reviewed all A[facility] Post-Birth Warning Signs and essential teaching points for pulmonary embolism, cardiac disease, hypertensive disorders of pregnancy, obstetric hemorrhage, venous thromboembolism, infection, and postpartum depression with the patient and FOB . I have informed the patient on when to call their healthcare provider and when to call 911. I have discussed with the patient  the importance of scheduling a follow-up visit with their physician, nurse practitioner or midwife and provided them with correct contact information for appointment. I have provided the patient with a copy of the \"Save Your Life\" handout. The patient has acknowledged receiving and understanding this education with her signature. Interpolation Flap Text: A decision was made to reconstruct the defect utilizing an interpolation axial flap and a staged reconstruction.  A telfa template was made of the defect.  This telfa template was then used to outline the interpolation flap.  The donor area for the pedicle flap was then injected with anesthesia.  The flap was excised through the skin and subcutaneous tissue down to the layer of the underlying musculature.  The interpolation flap was carefully excised within this deep plane to maintain its blood supply.  The edges of the donor site were undermined.   The donor site was closed in a primary fashion.  The pedicle was then and carried over to the primary defect, rotated into position and sutured.  Once the tube was sutured into place, adequate blood supply was confirmed with blanching and refill.  The pedicle was then wrapped with xeroform gauze and dressed appropriately with a telfa and gauze bandage to ensure continued blood supply and protect the attached pedicle.

## 2023-05-24 NOTE — CONSULTS
[facility]    05/24/23      Max HR: lexiscan    57 year old    Wt Readings from Last 1 Encounters:   05/21/23 66.9 kg (147 lb 7.8 oz)          Ht Readings from Last 1 Encounters:   05/15/23 5' 7\" (1.702 m)       Patient Type: Inpatient    Cardiac Markers: Yes and See Results    Reason for test: Heart Failure    Ordering MD: Fernando      Cardiologist Performing Test: Feroz    --------------------------------------------------------------------------------------------------------------------  HISTORY OF: Diabetic, Smoker and HTN, GERD, Alcohol Abuse       PATIENT HAS NOT HAD THE FOLLOWING IN THE LAST 24 HOURS:  Caffeinated, Decaf Beverages, Chocolate or Chocolate Drink, Pain Meds (Anacin / Excedrin) , Over the Counter Appetite Suppresants and Medication / Inhalers.      ALLERGIES:  No Active Allergies      MEETS CRITERIA FOR STRESS TEST: Yes         NOTES: 0.4 mg lexiscan given IV followed by sestamibi IVP.  Patient c/o dyspnea. Dr. Redman at bedside, test completed.  Caffeine given in recovery and symptoms resolved.  Nuclear imaging to follow.      TYPE OF TEST: Myocardial Perfusion and Lexiscan    ABLE TO WALK: No. Unable to walk reason: safety     NEEDED: No     (36.3 °C) (Oral)   Resp 15   Ht 5' 9\" (1.753 m)   Wt 251 lb (113.9 kg)   SpO2 98%   BMI 37.07 kg/m²     CONSTITUTIONAL:  AOx4, no apparent distress, appears stated age, lights off in room, patient wears eye mask when lights turned on to examine   HEAD:  normocephalic, atraumatic    EYES:  PERRLA, EOMI, no lateral nystagmus bilaterally    ENT:  moist mucous membranes, uvula midline    NECK:  supple, symmetric, no midline tenderness to palpation    BACK:  without midline tenderness, step-offs or deformities    LUNGS:  clear to auscultation bilaterally    CARDIOVASCULAR:  Tachycardic, regular rhythm, no murmurs, rubs or gallops    ABDOMEN:  Soft, non-tender, non-distended with normal active bowel sounds    NEUROLOGIC:  EYE OPENING   Spontaneous - 4 Carreno.Jluis ]   To voice - 3 [ ]   To pain - 2 [ ]   None - 1 [ ]   VERBAL RESPONSE   Appropriate, oriented - 5 Carreno.Jluis ]   Dazed or confused - 4 [ ]   Syllables, expletives - 3 [ ]   Grunts - 2 [ ]   None - 1 [ ]   MOTOR RESPONSE   Spontaneous, command - 6 Carreno.Jluis ]   Localizes pain - 5 [ ]   Withdraws pain - 4 [ ]   Abnormal flexion - 3 [ ]   Abnormal extension - 2 [ ]   None - 1 [ ]   Total GCS: 15       Mental Status:   Level of Alertness: awake   Orientation: person, place, time   Memory: normal   Fund of Knowledge: normal   Attention/Concentration: normal   Language: normal     Cranial Nerves:   cranial nerves II-XII are grossly intact       Motor Exam:   Drift: absent   Tone: normal   LLE with 4/5 strength (baseline since surgery per patient)     Sensory:   Pinprick:   Right Upper Extremity: normal   Left Upper Extremity: normal   Right Lower Extremity: normal   Left Lower Extremity: normal (patient does report some numbness to the left foot)    Touch:   Right Upper Extremity: normal   Left Upper Extremity: normal   Right Lower Extremity: normal   Left Lower Extremity: normal     Coordination: Finger/Nose: Right: normal   Left: normal   Rapid Alternating Movements: Right: normal Left: normal     Deep Tendon Reflexes:   Right Bicep: 2+   Left Bicep: 2+   Right Tricep: 2+   Left Tricep: 2+   Right Brachial: 2+   Left Brachial: 2+   Right Knee: 2+   Left Knee: 2+   Right Ankle: 2+   Left Ankle: 2+   Plantar Response: Right: downgoing   Left: downgoing   Rectal:    SKIN:  no rash, sutures in place sacral region, well-healing, nontender to palpation except for most inferior suture, there is no drainage, there is no surrounding erythema, or warmth             LABS AND IMAGING:       Radiology Review:    Ct Head Wo Contrast    Result Date: 6/25/2020  EXAMINATION: CT OF THE HEAD WITHOUT CONTRAST  6/25/2020 1:51 pm TECHNIQUE: CT of the head was performed without the administration of intravenous contrast. Dose modulation, iterative reconstruction, and/or weight based adjustment of the mA/kV was utilized to reduce the radiation dose to as low as reasonably achievable. COMPARISON: CT brain 04/22/2017, MRI brain 11/21/2018, MRI CSF flow study 06/05/2019 HISTORY: ORDERING SYSTEM PROVIDED HISTORY: Dizziness, photophobia, diplopia, known Chiari malformation FINDINGS: BRAIN/VENTRICLES: No acute intracranial hemorrhage, mass effect or midline shift. No abnormal extra-axial fluid collection. The gray-white differentiation is maintained without evidence of an acute infarct. No evidence of hydrocephalus. Persistent cavum vergae. Mild cerebellar tonsillar herniation compatible with known Chiari malformation. ORBITS: The visualized portion of the orbits demonstrate no acute abnormality. SINUSES: The visualized paranasal sinuses and mastoid air cells demonstrate no acute abnormality. SOFT TISSUES/SKULL:  No acute abnormality of the visualized skull or soft tissues. Redemonstration of Chiari malformation, otherwise negative CT brain with no acute intracranial abnormality.      Xr Chest Portable    Result Date: 6/25/2020  EXAMINATION: ONE XRAY VIEW OF THE CHEST 6/25/2020 11:55 am COMPARISON: 04/30/2020 HISTORY: ORDERING SYSTEM PROVIDED HISTORY: Dizziness, chest heaviness FINDINGS: The lungs are without acute focal process. No effusion or pneumothorax. The cardiomediastinal silhouette is normal.  The osseous structures are intact without acute process. Unremarkable chest.     Ct Chest Pulmonary Embolism W Contrast    Result Date: 6/25/2020  EXAMINATION: CTA OF THE CHEST 6/25/2020 1:52 pm TECHNIQUE: CTA of the chest was performed after the administration of intravenous contrast.  Multiplanar reformatted images are provided for review. MIP images are provided for review. Dose modulation, iterative reconstruction, and/or weight based adjustment of the mA/kV was utilized to reduce the radiation dose to as low as reasonably achievable. COMPARISON: None. HISTORY: ORDERING SYSTEM PROVIDED HISTORY: Chest heaviness, elevated d-dimer FINDINGS: Pulmonary Arteries: Pulmonary arteries are suboptimally opacified for evaluation with limited assessment of the distal segmental and subsegmental vessels. No evidence of central/proximal intraluminal filling defect to suggest pulmonary embolism. Main pulmonary artery is normal in caliber. Mediastinum: No evidence of mediastinal lymphadenopathy. Normal heart size. Normal thoracic aorta. Lungs/pleura: The lungs are without acute process. No focal consolidation or pulmonary edema. No evidence of pleural effusion or pneumothorax. Upper Abdomen: Hepatic fatty infiltration. Soft Tissues/Bones: No significant osseous or soft tissue abnormality. *Pulmonary arteries are suboptimally opacified for evaluation with limited assessment of the distal segmental and subsegmental vessels. No evidence of central/proximal intraluminal filling defect to suggest pulmonary embolism. *Unremarkable appearance of the chest with clear lungs and no acute process. *Hepatic fatty infiltration.            ASSESSMENT AND PLAN:       Patient Active Problem List   Diagnosis    Reactive airway disease

## (undated) DEVICE — TRAY NRV BLK SUPP CUST

## (undated) DEVICE — PROTECTOR ULN NRV PUR FOAM HK LOOP STRP ANATOMICALLY

## (undated) DEVICE — ADHESIVE SKIN CLSR 0.7ML TOP DERMBND ADV

## (undated) DEVICE — KENDALL SCD EXPRESS SLEEVES, KNEE LENGTH, MEDIUM: Brand: KENDALL SCD

## (undated) DEVICE — TOWEL,OR,DSP,ST,BLUE,STD,6/PK,12PK/CS: Brand: MEDLINE

## (undated) DEVICE — TOWEL SURG W16XL26IN WHT NONFENESTRATED ST 2 PER PK

## (undated) DEVICE — TIP COVER ACCESSORY

## (undated) DEVICE — GLOVE SURG SZ 7 CRM LTX FREE POLYISOPRENE POLYMER BEAD ANTI

## (undated) DEVICE — NEEDLE INSUF L120MM DIA2MM DISP FOR PNEUMOPERI ENDOPATH

## (undated) DEVICE — CLIP INT L POLYMER LOK LIG HEM O LOK

## (undated) DEVICE — BLANKET WRM W29.9XL79.1IN UP BODY FORC AIR MISTRAL-AIR

## (undated) DEVICE — NEEDLE: Brand: SPROTTE®

## (undated) DEVICE — GOWN,AURORA,NONRNF,XL,30/CS: Brand: MEDLINE

## (undated) DEVICE — 40580 - THE PINK PAD - ADVANCED TRENDELENBURG POSITIONING KIT: Brand: 40580 - THE PINK PAD - ADVANCED TRENDELENBURG POSITIONING KIT

## (undated) DEVICE — SUTURE VCRL SZ 2-0 L36IN ABSRB VLT L36MM CT-1 1/2 CIR J345H

## (undated) DEVICE — BAG SPEC LAP H9IN DIA75IN 1500ML 10 12MM CANN ATTCH MEM

## (undated) DEVICE — ST. ANNE'S MULTI PORT PACK: Brand: MEDLINE INDUSTRIES, INC.

## (undated) DEVICE — ARM DRAPE

## (undated) DEVICE — GLOVE SURG SZ 7 THK118MIL BLK LTX FREE POLYISOPRENE BEAD CUF

## (undated) DEVICE — BLADELESS OBTURATOR: Brand: WECK VISTA

## (undated) DEVICE — SOLUTION IV IRRIG POUR BRL 0.9% SODIUM CHL 2F7124

## (undated) DEVICE — CANNULA SEAL

## (undated) DEVICE — SYRINGE EPI 7ML LUERLOCK RESISTANCE LOSS EPILOR

## (undated) DEVICE — CHLORAPREP 26ML ORANGE

## (undated) DEVICE — SUTURE MCRYL SZ 4-0 L27IN ABSRB UD L19MM PS-2 1/2 CIR PRIM Y426H

## (undated) DEVICE — CANNULA NSL AD L7FT O2 PRSS CRUSH RESIST TBNG M CONN AIRLFE

## (undated) DEVICE — OMNIPAQUE 180 10 ML

## (undated) DEVICE — SKIN MARKER,FINE TIP: Brand: DEVON

## (undated) DEVICE — SET EXTN SM 0.5ML L13IN BOR W/O INJ SITE

## (undated) DEVICE — PLUMEPORT LAPAROSCOPIC SMOKE FILTRATION DEVICE: Brand: PLUMEPORT ACTIV